# Patient Record
Sex: FEMALE | Race: ASIAN | NOT HISPANIC OR LATINO | Employment: FULL TIME | URBAN - METROPOLITAN AREA
[De-identification: names, ages, dates, MRNs, and addresses within clinical notes are randomized per-mention and may not be internally consistent; named-entity substitution may affect disease eponyms.]

---

## 2017-05-04 ENCOUNTER — RADIANT APPOINTMENT (OUTPATIENT)
Dept: MAMMOGRAPHY | Facility: CLINIC | Age: 57
End: 2017-05-04
Payer: COMMERCIAL

## 2017-05-04 DIAGNOSIS — Z12.31 VISIT FOR SCREENING MAMMOGRAM: ICD-10-CM

## 2017-05-04 PROCEDURE — G0202 SCR MAMMO BI INCL CAD: HCPCS | Mod: TC

## 2017-08-11 ENCOUNTER — TELEPHONE (OUTPATIENT)
Dept: FAMILY MEDICINE | Facility: CLINIC | Age: 57
End: 2017-08-11

## 2017-08-11 DIAGNOSIS — R73.09 OTHER ABNORMAL GLUCOSE: Primary | ICD-10-CM

## 2017-08-11 DIAGNOSIS — E78.5 HYPERLIPIDEMIA WITH TARGET LDL LESS THAN 130: ICD-10-CM

## 2017-08-11 NOTE — TELEPHONE ENCOUNTER
Reason for call:  Patient reporting a symptom    Symptom or request: Patient calling and has been checking blood sugars. Blood sugars was 167 this morning before breakfast. It has been running in the 120s-150s. Patient does not have a diabetes diagnosis but has been checking her sugars when she checks her father's. Patient is wondering if she should have her A1C checked.    Duration (how long have symptoms been present): She has been checking since June and it has always been elevated.    Have you been treated for this before? No    Additional comments: Patient uses [facility]    Phone Number patient can be reached at:  Home number on file [phone] (home)    Best Time:  any    Can we leave a detailed message on this number:  YES    Call taken on 8/11/2017 at 10:04 AM by Valentina Cm

## 2017-08-11 NOTE — TELEPHONE ENCOUNTER
Patient returned call to RN Triage line, RN picked up and scheduled labs and physical with [facility].      Pippa Andersen RN  [facility]

## 2017-08-11 NOTE — TELEPHONE ENCOUNTER
Please see message below regarding elevated blood sugars.  Last physical was 8/2014, A1c has not been checked, last OV was 5/2016.    Would be due for a physical.      Routed to PCP.      Pippa Andersen RN  [facility]

## 2017-08-11 NOTE — TELEPHONE ENCOUNTER
"Offer to schedule fasting labs and then follow up visit with me (call it \"elevated sugar\" or if she wants, call it AFE w/PAP)  "

## 2017-08-11 NOTE — TELEPHONE ENCOUNTER
Called and left detailed message, relaying request for appt as below.  Left RN Triage line # to schedule.      Pippa Andersen RN  [facility]

## 2017-08-15 DIAGNOSIS — Z11.59 NEED FOR HEPATITIS C SCREENING TEST: ICD-10-CM

## 2017-08-15 DIAGNOSIS — R73.09 OTHER ABNORMAL GLUCOSE: ICD-10-CM

## 2017-08-15 DIAGNOSIS — J45.20 MILD INTERMITTENT ASTHMA WITHOUT COMPLICATION: ICD-10-CM

## 2017-08-15 DIAGNOSIS — E78.5 HYPERLIPIDEMIA WITH TARGET LDL LESS THAN 130: ICD-10-CM

## 2017-08-15 LAB
ANION GAP SERPL CALCULATED.3IONS-SCNC: 8 MMOL/L (ref 3–14)
BUN SERPL-MCNC: 12 MG/DL (ref 7–30)
CALCIUM SERPL-MCNC: 9.2 MG/DL (ref 8.5–10.1)
CHLORIDE SERPL-SCNC: 104 MMOL/L (ref 94–109)
CHOLEST SERPL-MCNC: 268 MG/DL
CO2 SERPL-SCNC: 26 MMOL/L (ref 20–32)
CREAT SERPL-MCNC: 0.8 MG/DL (ref 0.52–1.04)
CREAT UR-MCNC: 231 MG/DL
GFR SERPL CREATININE-BSD FRML MDRD: 74 ML/MIN/1.7M2
GLUCOSE SERPL-MCNC: 135 MG/DL (ref 70–99)
HBA1C MFR BLD: 6.5 % (ref 4.3–6)
HCV AB SERPL QL IA: NONREACTIVE
HDLC SERPL-MCNC: 40 MG/DL
LDLC SERPL CALC-MCNC: 189 MG/DL
MICROALBUMIN UR-MCNC: 12 MG/L
MICROALBUMIN/CREAT UR: 5.06 MG/G CR (ref 0–25)
NONHDLC SERPL-MCNC: 228 MG/DL
POTASSIUM SERPL-SCNC: 3.9 MMOL/L (ref 3.4–5.3)
SODIUM SERPL-SCNC: 138 MMOL/L (ref 133–144)
TRIGL SERPL-MCNC: 193 MG/DL
TSH SERPL DL<=0.005 MIU/L-ACNC: 1.34 MU/L (ref 0.4–4)

## 2017-08-15 PROCEDURE — 80048 BASIC METABOLIC PNL TOTAL CA: CPT | Performed by: INTERNAL MEDICINE

## 2017-08-15 PROCEDURE — 86803 HEPATITIS C AB TEST: CPT | Performed by: INTERNAL MEDICINE

## 2017-08-15 PROCEDURE — 82043 UR ALBUMIN QUANTITATIVE: CPT | Performed by: INTERNAL MEDICINE

## 2017-08-15 PROCEDURE — 36415 COLL VENOUS BLD VENIPUNCTURE: CPT | Performed by: INTERNAL MEDICINE

## 2017-08-15 PROCEDURE — 83036 HEMOGLOBIN GLYCOSYLATED A1C: CPT | Performed by: INTERNAL MEDICINE

## 2017-08-15 PROCEDURE — 84443 ASSAY THYROID STIM HORMONE: CPT | Performed by: INTERNAL MEDICINE

## 2017-08-15 PROCEDURE — 80061 LIPID PANEL: CPT | Performed by: INTERNAL MEDICINE

## 2017-08-15 NOTE — LETTER
[facility]  [address] [place]  [address]  [phone]        August 17, 2017    Oxana Malachi  [address]  [facility] [address]        Dear Karyn,    Here are your labs as we discussed today. The glucose (blood sugar) is a little elevated. The HgbA1C is right at the borderline level for a diagnosis of diabetes.  So, I think you have mild, early diabetes.  Weight loss and diet will help this a lot.  You will see the diabetic educators for this.     The cholesterol medication (atorvastatin) is very important in protecting the heart.  You also have very high cholesterol, so we have two reasons to have you take this.     I will see you in 3 months (November).  Get a fasting lab draw prior to the appointment.  I look forward to seeing you then.     Results for orders placed or performed in visit on 08/15/17   Basic metabolic panel   Result Value Ref Range    Sodium 138 133 - 144 mmol/L    Potassium 3.9 3.4 - 5.3 mmol/L    Chloride 104 94 - 109 mmol/L    Carbon Dioxide 26 20 - 32 mmol/L    Anion Gap 8 3 - 14 mmol/L    Glucose 135 (H) 70 - 99 mg/dL    Urea Nitrogen 12 7 - 30 mg/dL    Creatinine 0.80 0.52 - 1.04 mg/dL    GFR Estimate 74 >60 mL/min/1.7m2    GFR Estimate If Black 90 >60 mL/min/1.7m2    Calcium 9.2 8.5 - 10.1 mg/dL   Hemoglobin A1c   Result Value Ref Range    Hemoglobin A1C 6.5 (H) 4.3 - 6.0 %   Albumin Random Urine Quantitative   Result Value Ref Range    Creatinine Urine 231 mg/dL    Albumin Urine mg/L 12 mg/L    Albumin Urine mg/g Cr 5.06 0 - 25 mg/g Cr   TSH with free T4 reflex   Result Value Ref Range    TSH 1.34 0.40 - 4.00 mU/L   Lipid panel reflex to direct LDL   Result Value Ref Range    Cholesterol 268 (H) <200 mg/dL    Triglycerides 193 (H) <150 mg/dL    HDL Cholesterol 40 (L) >49 mg/dL    LDL Cholesterol Calculated 189 (H) <100 mg/dL    Non HDL Cholesterol 228 (H) <130 mg/dL   Hepatitis C antibody   Result Value Ref Range    Hepatitis C  Antibody Nonreactive NR^Nonreactive       If you have any questions please call the clinic at [phone].    Sincerely,    Talia CARR

## 2017-08-17 ENCOUNTER — OFFICE VISIT (OUTPATIENT)
Dept: FAMILY MEDICINE | Facility: CLINIC | Age: 57
End: 2017-08-17
Payer: COMMERCIAL

## 2017-08-17 VITALS
HEIGHT: 60 IN | SYSTOLIC BLOOD PRESSURE: 95 MMHG | HEART RATE: 61 BPM | WEIGHT: 139 LBS | OXYGEN SATURATION: 97 % | BODY MASS INDEX: 27.29 KG/M2 | TEMPERATURE: 97.7 F | DIASTOLIC BLOOD PRESSURE: 67 MMHG

## 2017-08-17 DIAGNOSIS — J45.20 MILD INTERMITTENT ASTHMA WITHOUT COMPLICATION: ICD-10-CM

## 2017-08-17 DIAGNOSIS — E11.9 TYPE 2 DIABETES MELLITUS WITHOUT COMPLICATION, WITHOUT LONG-TERM CURRENT USE OF INSULIN (H): ICD-10-CM

## 2017-08-17 DIAGNOSIS — E78.5 HYPERLIPIDEMIA LDL GOAL <100: ICD-10-CM

## 2017-08-17 DIAGNOSIS — Z23 NEED FOR PNEUMOCOCCAL VACCINATION: ICD-10-CM

## 2017-08-17 DIAGNOSIS — Z00.00 ROUTINE GENERAL MEDICAL EXAMINATION AT A HEALTH CARE FACILITY: Primary | ICD-10-CM

## 2017-08-17 DIAGNOSIS — Z12.4 SCREENING FOR MALIGNANT NEOPLASM OF CERVIX: ICD-10-CM

## 2017-08-17 PROCEDURE — 90732 PPSV23 VACC 2 YRS+ SUBQ/IM: CPT | Performed by: INTERNAL MEDICINE

## 2017-08-17 PROCEDURE — 99396 PREV VISIT EST AGE 40-64: CPT | Mod: 25 | Performed by: INTERNAL MEDICINE

## 2017-08-17 PROCEDURE — 90471 IMMUNIZATION ADMIN: CPT | Performed by: INTERNAL MEDICINE

## 2017-08-17 PROCEDURE — G0476 HPV COMBO ASSAY CA SCREEN: HCPCS | Performed by: INTERNAL MEDICINE

## 2017-08-17 PROCEDURE — G0145 SCR C/V CYTO,THINLAYER,RESCR: HCPCS | Performed by: INTERNAL MEDICINE

## 2017-08-17 PROCEDURE — 99213 OFFICE O/P EST LOW 20 MIN: CPT | Mod: 25 | Performed by: INTERNAL MEDICINE

## 2017-08-17 RX ORDER — LANCETS
EACH MISCELLANEOUS
Qty: 100 EACH | Refills: 3 | Status: SHIPPED | OUTPATIENT
Start: 2017-08-17 | End: 2018-05-25

## 2017-08-17 RX ORDER — GLUCOSAMINE HCL/CHONDROITIN SU 500-400 MG
CAPSULE ORAL
Qty: 100 EACH | Refills: 3 | Status: SHIPPED | OUTPATIENT
Start: 2017-08-17 | End: 2018-05-25

## 2017-08-17 RX ORDER — ATORVASTATIN CALCIUM 10 MG/1
10 TABLET, FILM COATED ORAL DAILY
Qty: 90 TABLET | Refills: 3 | Status: SHIPPED | OUTPATIENT
Start: 2017-08-17 | End: 2017-11-30 | Stop reason: DRUGHIGH

## 2017-08-17 RX ORDER — GLUCOSAMINE HCL/CHONDROITIN SU 500-400 MG
CAPSULE ORAL
Qty: 100 EACH | Refills: 3 | Status: SHIPPED | OUTPATIENT
Start: 2017-08-17 | End: 2017-08-17

## 2017-08-17 RX ORDER — LANCETS
EACH MISCELLANEOUS
Qty: 100 EACH | Refills: 3 | Status: SHIPPED | OUTPATIENT
Start: 2017-08-17 | End: 2017-08-17

## 2017-08-17 RX ORDER — ATORVASTATIN CALCIUM 20 MG/1
20 TABLET, FILM COATED ORAL DAILY
Qty: 90 TABLET | Refills: 3 | Status: SHIPPED | OUTPATIENT
Start: 2017-08-17 | End: 2018-09-17

## 2017-08-17 ASSESSMENT — PAIN SCALES - GENERAL: PAINLEVEL: NO PAIN (0)

## 2017-08-17 NOTE — MR AVS SNAPSHOT
After Visit Summary   8/17/2017    Karyn Ly    MRN: [id number]           Patient Information     Date Of Birth          1960        Visit Information        Provider Department      8/17/2017 7:20 AM Talia Soto MD [facility]        Today's Diagnoses     Routine general medical examination at a health care facility    -  1    Type 2 diabetes mellitus without complication, without long-term current use of insulin (H)        Mild intermittent asthma without complication        Hyperlipidemia LDL goal <100        Screening for malignant neoplasm of cervix        Need for pneumococcal vaccination          Care Instructions    Start atorvastatin 20 mg daily    Diabetic education will call you -- get diabetes supplies prior to appointment and bring with you    Recheck fasting labs in 3 months, follow up visit a few days later.               Follow-ups after your visit        Additional Services     DIABETES EDUCATOR REFERRAL       DIABETES SELF MANAGEMENT TRAINING (DSMT)      Your provider has referred you to Diabetes Education: FMG: Diabetes Education - All [facility] [phone]   [email or web address]    Type of training and number of hours: New Diagnosis: Initial group DSMT - 10 hours.      Medicare covers: 10 hours of initial DSMT in 12 month period from the time of first visit, plus 2 hours of follow-up DSMT annually, and additional hours as requested for insulin training.    Diabetes Type: Type 2 - Diet Control             Diabetes Co-Morbidities: none               A1C Goal:  <7.0       A1C is: Lab Results       Component                Value               Date                       A1C                      6.5                 08/15/2017              If an urgent visit is needed or A1C is above 12, Care Team to call the Diabetes Education Team at [phone] or send an In Basket message to the  Diabetes Education Pool (P DIAB ED-PATIENT CARE).    Diabetes Education Topics: Comprehensive Knowledge Assessment and Instruction    Special Educational Needs Requiring Individual DSMT: None       MEDICAL NUTRITION THERAPY (MNT) for Diabetes    Medical Nutrition Therapy with a Registered Dietitian can be provided in coordination with Diabetes Self-Management Training to assist in achieving optimal diabetes management.    MNT Type and Hours: New diagnosis: Initial MNT - 3 hours                       Medicare will cover: 3 hours initial MNT in 12 month period after first visit, plus 2 hours of follow-up MNT annually    Please be aware that coverage of these services is subject to the terms and limitations of your health insurance plan.  Call member services at your health plan to determine Diabetes Self-Management Training benefits and ask which blood glucose monitor brands are covered by your plan.      Please bring the following with you to your appointment:    (1)  List of current medications   (2)  List of Blood Glucose Monitor brands that are covered by your insurance plan  (3)  Blood Glucose Monitor and log book  (4)   Food records for the 3 days prior to your visit    The Certified Diabetes Educator may make diabetes medication adjustments per the CDE Protocol and Collaborative Practice Agreement.                  Follow-up notes from your care team     Return in about 3 months (around 11/17/2017) for diabetes follow up.      Your next 10 appointments already scheduled     Apr 19, 2018  1:30 PM CDT   [facility] [place] Lou MD   [facility]    [address]  [place] [address]   [phone]              Future tests that were ordered for you today     Open Future Orders        Priority Expected Expires Ordered    Hemoglobin A1c Routine  8/17/2018 8/17/2017    Lipid panel reflex to direct LDL Routine  8/16/2018 8/17/2017    AST Routine  8/17/2018  "2017    ALT Routine  2018    CK total Routine  2018            Who to contact     If you have questions or need follow up information about today's clinic visit or your schedule please contact [facility] directly at [phone].  Normal or non-critical lab and imaging results will be communicated to you by MyChart, letter or phone within 4 business days after the clinic has received the results. If you do not hear from us within 7 days, please contact the clinic through MyChart or phone. If you have a critical or abnormal lab result, we will notify you by phone as soon as possible.  Submit refill requests through [employer] or call your pharmacy and they will forward the refill request to us. Please allow 3 business days for your refill to be completed.          Additional Information About Your Visit        MyChart Information     [facility] lets you send messages to your doctor, view your test results, renew your prescriptions, schedule appointments and more. To sign up, go to [email or web address] . Click on \"Log in\" on the left side of the screen, which will take you to the Welcome page. Then click on \"Sign up Now\" on the right side of the page.     You will be asked to enter the access code listed below, as well as some personal information. Please follow the directions to create your username and password.     Your access code is: [email or web address]  Expires: 11/15/2017  6:39 AM     Your access code will  in 90 days. If you need help or a new code, please call your [facility] or [phone].        Care EveryWhere ID     This is your Care EveryWhere ID. This could be used by other organizations to access your [facility] medical records  [id number]        Your Vitals Were     Pulse Temperature Height Pulse Oximetry BMI (Body Mass Index)       61 97.7  F (36.5  C) (Oral) 4' 11.5\" (1.511 m) 97% 27.6 kg/m2        Blood Pressure from Last 3 Encounters: "   08/17/17 95/67   05/03/16 106/70   04/21/15 110/77    Weight from Last 3 Encounters:   08/17/17 139 lb (63 kg)   05/03/16 143 lb (64.9 kg)   04/21/15 132 lb (59.9 kg)              We Performed the Following     ADMIN 1st VACCINE     Asthma Action Plan (AAP)     DIABETES EDUCATOR REFERRAL     HPV High Risk Types DNA Cervical     Pap imaged thin layer screen with HPV - recommended age 30 - 65 years (select HPV order below)     PNEUMOCOCCAL VACCINE,ADULT,SQ OR IM          Today's Medication Changes          These changes are accurate as of: 8/17/17  7:59 AM.  If you have any questions, ask your nurse or doctor.               Start taking these medicines.        Dose/Directions    ACE/ARB NOT PRESCRIBED (INTENTIONAL)   Used for:  Type 2 diabetes mellitus without complication, without long-term current use of insulin (H)   Started by:  Talia Soto MD        Please choose reason not prescribed, below   Refills:  0       alcohol swab prep pads   Used for:  Type 2 diabetes mellitus without complication, without long-term current use of insulin (H)   Started by:  Talia Soto MD        Use to swab area of injection/santosh as directed.   Quantity:  100 each   Refills:  3       * atorvastatin 10 MG tablet   Commonly known as:  LIPITOR   Used for:  Hyperlipidemia LDL goal <100   Started by:  Talia Soto MD        Dose:  10 mg   Take 1 tablet (10 mg) by mouth daily   Quantity:  90 tablet   Refills:  3       * atorvastatin 20 MG tablet   Commonly known as:  LIPITOR   Used for:  Hyperlipidemia LDL goal <100, Type 2 diabetes mellitus without complication, without long-term current use of insulin (H)   Started by:  Talia Soto MD        Dose:  20 mg   Take 1 tablet (20 mg) by mouth daily   Quantity:  90 tablet   Refills:  3       blood glucose calibration solution   Commonly known as:  NO BRAND SPECIFIED   Used for:  Type 2 diabetes mellitus without complication, without long-term current use of  insulin (H)   Started by:  Talia Soto MD        To accompany: Blood Glucose Monitor Brands: per insurance.   Quantity:  1 Bottle   Refills:  3       blood glucose monitoring meter device kit   Commonly known as:  no brand specified   Used for:  Type 2 diabetes mellitus without complication, without long-term current use of insulin (H)   Started by:  Talia Soto MD        Use to test blood sugar 1 times daily or as directed. Preferred blood glucose meter OR supplies to accompany: Blood Glucose Monitor Brands: per insurance.   Quantity:  1 kit   Refills:  0       blood glucose monitoring test strip   Commonly known as:  no brand specified   Used for:  Type 2 diabetes mellitus without complication, without long-term current use of insulin (H)   Started by:  Talia Soto MD        Use to test blood sugar 1 times daily or as directed. To accompany: Blood Glucose Monitor Brands: per insurance.   Quantity:  100 strip   Refills:  6       thin lancets   Commonly known as:  NO BRAND SPECIFIED   Used for:  Type 2 diabetes mellitus without complication, without long-term current use of insulin (H)   Started by:  Talia Soto MD        Use with lanceting device. To accompany: Blood Glucose Monitor Brands: per insurance.   Quantity:  100 each   Refills:  3       * Notice:  This list has 2 medication(s) that are the same as other medications prescribed for you. Read the directions carefully, and ask your doctor or other care provider to review them with you.         Where to get your medicines      These medications were sent to [facility]/pharmacy #[id number] - [address] AT CORNER OF [address] [place] [id number]     Phone:  [phone]     alcohol swab prep pads    atorvastatin 10 MG tablet    atorvastatin 20 MG tablet    blood glucose calibration solution    blood glucose monitoring test strip    thin lancets         Some of these will need a paper prescription and  others can be bought over the counter.  Ask your nurse if you have questions.     Bring a paper prescription for each of these medications     blood glucose monitoring meter device kit       You don't need a prescription for these medications     ACE/ARB NOT PRESCRIBED (INTENTIONAL)                Primary Care Provider Office Phone # Fax #    Talia Soto [phone]       [address] [facility] [address]        Equal Access to Services     LAVELL CROWELL : Hadii aad ku hadasho Soomaali, waaxda luqadaha, qaybta kaalmada adeegyada, waxay idiin hayaan adeeg khjuan carlossh laenio . So [facility] [phone].    ATENCIÓN: Si habla yoav, tiene a tony disposición servicios gratuitos de asistencia lingüística. Llame al [phone].    We comply with applicable federal civil rights laws and [place] laws. We do not discriminate on the basis of race, color, national origin, age, disability sex, sexual orientation or gender identity.            Thank you!     Thank you for choosing [facility]  for your care. Our goal is always to provide you with excellent care. Hearing back from our patients is one way we can continue to improve our services. Please take a few minutes to complete the written survey that you may receive in the mail after your visit with us. Thank you!             Your Updated Medication List - Protect others around you: Learn how to safely use, store and throw away your medicines at www.disposemymeds.org.          This list is accurate as of: 8/17/17  7:59 AM.  Always use your most recent med list.                   Brand Name Dispense Instructions for use Diagnosis    ACE/ARB NOT PRESCRIBED (INTENTIONAL)      Please choose reason not prescribed, below    Type 2 diabetes mellitus without complication, without long-term current use of insulin (H)       albuterol 108 (90 BASE) MCG/ACT Inhaler    PROAIR HFA/PROVENTIL HFA/VENTOLIN HFA    3 Inhaler    Inhale 2  puffs into the lungs every 6 hours as needed for shortness of breath / dyspnea or wheezing    URI (upper respiratory infection), Mild intermittent asthma, Fever       alcohol swab prep pads     100 each    Use to swab area of injection/santosh as directed.    Type 2 diabetes mellitus without complication, without long-term current use of insulin (H)       * atorvastatin 10 MG tablet    LIPITOR    90 tablet    Take 1 tablet (10 mg) by mouth daily    Hyperlipidemia LDL goal <100       * atorvastatin 20 MG tablet    LIPITOR    90 tablet    Take 1 tablet (20 mg) by mouth daily    Hyperlipidemia LDL goal <100, Type 2 diabetes mellitus without complication, without long-term current use of insulin (H)       blood glucose calibration solution    NO BRAND SPECIFIED    1 Bottle    To accompany: Blood Glucose Monitor Brands: per insurance.    Type 2 diabetes mellitus without complication, without long-term current use of insulin (H)       blood glucose monitoring meter device kit    no brand specified    1 kit    Use to test blood sugar 1 times daily or as directed. Preferred blood glucose meter OR supplies to accompany: Blood Glucose Monitor Brands: per insurance.    Type 2 diabetes mellitus without complication, without long-term current use of insulin (H)       blood glucose monitoring test strip    no brand specified    100 strip    Use to test blood sugar 1 times daily or as directed. To accompany: Blood Glucose Monitor Brands: per insurance.    Type 2 diabetes mellitus without complication, without long-term current use of insulin (H)       fluticasone 50 MCG/ACT spray    FLONASE    16 g    Spray 1-2 sprays into both nostrils daily    Post-nasal drip       thin lancets    NO BRAND SPECIFIED    100 each    Use with lanceting device. To accompany: Blood Glucose Monitor Brands: per insurance.    Type 2 diabetes mellitus without complication, without long-term current use of insulin (H)       triamcinolone 0.1 % cream     KENALOG    15 g    Apply sparingly to affected area three times daily for 14 days.    Eczema, unspecified eczema       * Notice:  This list has 2 medication(s) that are the same as other medications prescribed for you. Read the directions carefully, and ask your doctor or other care provider to review them with you.

## 2017-08-17 NOTE — PROGRESS NOTES
Karyn Crespono    Here are your labs as we discussed today. The glucose (blood sugar) is a little elevated. The HgbA1C is right at the borderline level for a diagnosis of diabetes.  So, I think you have mild, early diabetes.  Weight loss and diet will help this a lot.  You will see the diabetic educators for this.     The cholesterol medication (atorvastatin) is very important in protecting the heart.  You also have very high cholesterol, so we have two reasons to have you take this.     I will see you in 3 months (November).  Get a fasting lab draw prior to the appointment.  I look forward to seeing you then.       Sincerely,       RUFINO SMITH M.D.

## 2017-08-17 NOTE — PATIENT INSTRUCTIONS
Start atorvastatin 20 mg daily    Diabetic education will call you -- get diabetes supplies prior to appointment and bring with you    Recheck fasting labs in 3 months, follow up visit a few days later.

## 2017-08-17 NOTE — LETTER
August 23, 2017    Karyn Ly  [address] [facility] [address]    Dear Karyn,  We are happy to inform you that your PAP smear result from 8/17/17 is normal.  We are now able to do a follow up test on PAP smears. The DNA test is for HPV (Human Papilloma Virus). Cervical cancer is closely linked with certain types of HPV. Your result showed no evidence of high risk HPV.  Therefore we recommend you return in 3 years for your next pap smear and HPV test.  You will still need to return to the clinic every year for an annual exam and other preventive tests.  Please contact the clinic at [phone] with any questions.  Sincerely,    Talia Soto MD/paddy

## 2017-08-17 NOTE — NURSING NOTE
"Chief Complaint   Patient presents with     Physical     Health Maintenance     pap,AAP,ACT,eye,Hep C     *_* Health Care Directive *_*       Initial BP 95/67 (BP Location: Right arm, Patient Position: Chair, Cuff Size: Adult Regular)  Pulse 61  Temp 97.7  F (36.5  C) (Oral)  Ht 4' 11.5\" (1.511 m)  Wt 139 lb (63 kg)  SpO2 97%  BMI 27.6 kg/m2 Estimated body mass index is 27.6 kg/(m^2) as calculated from the following:    Height as of this encounter: 4' 11.5\" (1.511 m).    Weight as of this encounter: 139 lb (63 kg).  Medication Reconciliation: complete.  VIVIANE Mclaughlin MA      "

## 2017-08-17 NOTE — PROGRESS NOTES
SUBJECTIVE:   CC: Karyn Ly is an 56 year old woman who presents for preventive health visit.     Physical   Annual:     Getting at least 3 servings of Calcium per day::  Yes    Bi-annual eye exam::  Yes    Dental care twice a year::  NO    Sleep apnea or symptoms of sleep apnea::  Excessive snoring    Diet::  Regular (no restrictions)    Frequency of exercise::  2-3 days/week    Duration of exercise::  15-30 minutes    Taking medications regularly::  Not Applicable    Additional concerns today::  YES               Today's PHQ-2 Score: PHQ-2 ( 1999 [employer]) 8/17/2017   Q1: Little interest or pleasure in doing things 0   Q2: Feeling down, depressed or hopeless 0   PHQ-2 Score 0   Q1: Little interest or pleasure in doing things Not at all   Q2: Feeling down, depressed or hopeless Not at all   PHQ-2 Score 0       Abuse: Current or Past(Physical, Sexual or Emotional)- No  Do you feel safe in your environment - Yes    Social History   Substance Use Topics     Smoking status: Never Smoker     Smokeless tobacco: Never Used     Alcohol use 0.0 oz/week     0 Standard drinks or equivalent per week      Comment: occasional      The patient does not drink >3 drinks per day nor >7 drinks per week.    Reviewed orders with patient.  Reviewed health maintenance and updated orders accordingly - Yes  Labs reviewed in EPIC  BP Readings from Last 3 Encounters:   08/17/17 95/67   05/03/16 106/70   04/21/15 110/77    Wt Readings from Last 3 Encounters:   08/17/17 139 lb (63 kg)   05/03/16 143 lb (64.9 kg)   04/21/15 132 lb (59.9 kg)                  Patient Active Problem List   Diagnosis     Hyperlipidemia LDL goal <100     Mild intermittent asthma     Past Surgical History:   Procedure Laterality Date     COLONOSCOPY  2/17/2014    Procedure: COLONOSCOPY;  colon-screening / vandana;  Surgeon: Donovan Dinero MD;  Location:  OR       Social History   Substance Use Topics     Smoking status: Never Smoker      Smokeless tobacco: Never Used     Alcohol use 0.0 oz/week     0 Standard drinks or equivalent per week      Comment: occasional      Family History   Problem Relation Age of Onset     Hypertension Mother      DIABETES Father      Glaucoma Father      Lipids Brother      Macular Degeneration No family hx of          Current Outpatient Prescriptions   Medication Sig Dispense Refill     atorvastatin (LIPITOR) 10 MG tablet Take 1 tablet (10 mg) by mouth daily 90 tablet 3     ACE/ARB NOT PRESCRIBED, INTENTIONAL, Please choose reason not prescribed, below       atorvastatin (LIPITOR) 20 MG tablet Take 1 tablet (20 mg) by mouth daily 90 tablet 3     fluticasone (FLONASE) 50 MCG/ACT nasal spray Spray 1-2 sprays into both nostrils daily 16 g 6     albuterol (PROAIR HFA, PROVENTIL HFA, VENTOLIN HFA) 108 (90 BASE) MCG/ACT inhaler Inhale 2 puffs into the lungs every 6 hours as needed for shortness of breath / dyspnea or wheezing 3 Inhaler 1     triamcinolone (KENALOG) 0.1 % cream Apply sparingly to affected area three times daily for 14 days. (Patient not taking: Reported on 8/17/2017) 15 g 0     No Known Allergies  Recent Labs   Lab Test  08/15/17   0654  08/12/14   0737  02/06/14   0842   A1C  6.5*   --    --    LDL  189*   --   177*   HDL  40*   --   42*   TRIG  193*   --   135   CR  0.80   --   0.73   GFRESTIMATED  74   --   84   GFRESTBLACK  90   --   >90   POTASSIUM  3.9   --   4.1   TSH  1.34  1.21   --               Patient over age 50, mutual decision to screen reflected in health maintenance.      Pertinent mammograms are reviewed under the imaging tab.  History of abnormal Pap smear:   Last 3 Pap Results:   PAP (no units)   Date Value   02/06/2014 NIL       Reviewed and updated as needed this visit by clinical staffTobacco  Allergies  Meds  Med Hx  Surg Hx  Fam Hx  Soc Hx        Reviewed and updated as needed this visit by Provider          Past Medical History:   Diagnosis Date     Asthma       Past Surgical  "History:   Procedure Laterality Date     COLONOSCOPY  2/17/2014    Procedure: COLONOSCOPY;  colon-screening / vandana;  Surgeon: Donovan Dinero MD;  Location: [facility] OR         ROS:  C: NEGATIVE for fever, chills, change in weight  I: NEGATIVE for worrisome rashes, moles or lesions  E: NEGATIVE for vision changes or irritation  ENT: NEGATIVE for ear, mouth and throat problems  R: NEGATIVE for significant cough or SOB  B: NEGATIVE for masses, tenderness or discharge  CV: NEGATIVE for chest pain, palpitations or peripheral edema  GI: NEGATIVE for nausea, abdominal pain, heartburn, or change in bowel habits  : NEGATIVE for unusual urinary or vaginal symptoms. No vaginal bleeding.  M: NEGATIVE for significant arthralgias or myalgia  N: NEGATIVE for weakness, dizziness or paresthesias  E: NEGATIVE for temperature intolerance, skin/hair changes  P: NEGATIVE for changes in mood or affect      OBJECTIVE:   BP 95/67 (BP Location: Right arm, Patient Position: Chair, Cuff Size: Adult Regular)  Pulse 61  Temp 97.7  F (36.5  C) (Oral)  Ht 4' 11.5\" (1.511 m)  Wt 139 lb (63 kg)  SpO2 97%  BMI 27.6 kg/m2  EXAM:  GENERAL APPEARANCE: healthy, alert and no distress  EYES: Eyes grossly normal to inspection, PERRL and conjunctivae and sclerae normal  HENT: ear canals and TM's normal, nose and mouth without ulcers or lesions, oropharynx clear and oral mucous membranes moist  NECK: no adenopathy, no asymmetry, masses, or scars and thyroid normal to palpation  RESP: lungs clear to auscultation - no rales, rhonchi or wheezes  BREAST: normal without masses, tenderness or nipple discharge and no palpable axillary masses or adenopathy  CV: regular rate and rhythm, normal S1 S2, no S3 or S4, no murmur, click or rub, no peripheral edema and peripheral pulses strong  ABDOMEN: soft, nontender, no hepatosplenomegaly, no masses and bowel sounds normal   (female): normal female external genitalia, normal urethral meatus, vaginal " mucosal atrophy noted, normal cervix, adnexae, and uterus without masses or abnormal discharge   (female): pap done with good view cervix  MS: no musculoskeletal defects are noted and gait is age appropriate without ataxia  SKIN: no suspicious lesions or rashes  NEURO: Normal strength and tone, sensory exam grossly normal, mentation intact and speech normal  PSYCH: mentation appears normal and affect normal/bright    ASSESSMENT/PLAN:       ICD-10-CM    1. Routine general medical examination at a health care facility Z00.00    2. Type 2 diabetes mellitus without complication, without long-term current use of insulin (H) E11.9 ACE/ARB NOT PRESCRIBED, INTENTIONAL,     Hemoglobin A1c     DIABETES EDUCATOR REFERRAL     Lipid panel reflex to direct LDL     atorvastatin (LIPITOR) 20 MG tablet     blood glucose monitoring (NO BRAND SPECIFIED) meter device kit     blood glucose monitoring (NO BRAND SPECIFIED) test strip     blood glucose calibration (NO BRAND SPECIFIED) solution     thin (NO BRAND SPECIFIED) lancets     alcohol swab prep pads     OFFICE/OUTPT VISIT,EST,LEVL III     DISCONTINUED: blood glucose monitoring (NO BRAND SPECIFIED) meter device kit     DISCONTINUED: blood glucose monitoring (NO BRAND SPECIFIED) test strip     DISCONTINUED: blood glucose calibration (NO BRAND SPECIFIED) solution     DISCONTINUED: thin (NO BRAND SPECIFIED) lancets     DISCONTINUED: alcohol swab prep pads   3. Mild intermittent asthma without complication J45.20 OFFICE/OUTPT VISIT,EST,LEVL III   4. Hyperlipidemia LDL goal <100 E78.5 atorvastatin (LIPITOR) 10 MG tablet     Lipid panel reflex to direct LDL     AST     ALT     CK total     atorvastatin (LIPITOR) 20 MG tablet   5. Screening for malignant neoplasm of cervix Z12.4 Pap imaged thin layer screen with HPV - recommended age 30 - 65 years (select HPV order below)     HPV High Risk Types DNA Cervical   6. Need for pneumococcal vaccination Z23 ADMIN 1st VACCINE     PNEUMOCOCCAL  "VACCINE,ADULT,SQ OR IM     DISCONTINUED: pneumococcal vaccine (PNEUMOVAX 23-SEVEN) 25 MCG/0.5ML injection         Patient with new borderline DM.  Will start cholesterol Rx and DM education.   Side effects discussed and questions answered.     Return to clinic 3 months with FLP and A1C    ACE not started.  BP is low at baseline.     COUNSELING:  Reviewed preventive health counseling, as reflected in patient instructions       Regular exercise         reports that she has never smoked. She has never used smokeless tobacco.    Estimated body mass index is 27.6 kg/(m^2) as calculated from the following:    Height as of this encounter: 4' 11.5\" (1.511 m).    Weight as of this encounter: 139 lb (63 kg).   Weight management plan: DM education    Counseling Resources:  ATP IV Guidelines  Pooled Cohorts Equation Calculator  Breast Cancer Risk Calculator  FRAX Risk Assessment  ICSI Preventive Guidelines  Dietary Guidelines for Americans, 2010  USDA's MyPlate  ASA Prophylaxis  Lung CA Screening    Talia Soto MD  [facility]  Answers for HPI/ROS submitted by the patient on 8/17/2017   PHQ-2 Score: 0    "

## 2017-08-17 NOTE — NURSING NOTE
Screening Questionnaire for Adult Immunization    Are you sick today?   No   Do you have allergies to medications, food, a vaccine component or latex?   No   Have you ever had a serious reaction after receiving a vaccination?   No   Do you have a long-term health problem with heart disease, lung disease, asthma, kidney disease, metabolic disease (e.g. diabetes), anemia, or other blood disorder?   Yes   Do you have cancer, leukemia, HIV/AIDS, or any other immune system problem?   No   In the past 3 months, have you taken medications that affect  your immune system, such as prednisone, other steroids, or anticancer drugs; drugs for the treatment of rheumatoid arthritis, Crohn s disease, or psoriasis; or have you had radiation treatments?   No   Have you had a seizure, or a brain or other nervous system problem?   No   During the past year, have you received a transfusion of blood or blood     products, or been given immune (gamma) globulin or antiviral drug?   No   For women: Are you pregnant or is there a chance you could become        pregnant during the next month?   No   Have you received any vaccinations in the past 4 weeks?   No     Immunization questionnaire was positive for at least one answer.  Notified Talia Stoo.        Per orders of Dr. Soto, injection of Pneumococcal 23 was given. Patient instructed to remain in clinic for 15 minutes afterwards, and to report any adverse reaction to me immediately.    Prior to injection verified patient identity using patient's name and date of birth.  Vaccine information supplied.     Screening performed by Ngoc Godfrey on 8/17/2017 at 8:13 AM.

## 2017-08-18 LAB
COPATH REPORT: NORMAL
PAP: NORMAL

## 2017-08-18 ASSESSMENT — ASTHMA QUESTIONNAIRES: ACT_TOTALSCORE: 25

## 2017-08-21 LAB
FINAL DIAGNOSIS: NORMAL
HPV HR 12 DNA CVX QL NAA+PROBE: NEGATIVE
HPV16 DNA SPEC QL NAA+PROBE: NEGATIVE
HPV18 DNA SPEC QL NAA+PROBE: NEGATIVE
SPECIMEN DESCRIPTION: NORMAL

## 2017-09-21 ENCOUNTER — OFFICE VISIT (OUTPATIENT)
Dept: NUTRITION | Facility: CLINIC | Age: 57
End: 2017-09-21
Payer: COMMERCIAL

## 2017-09-21 VITALS — BODY MASS INDEX: 27.31 KG/M2 | WEIGHT: 137.5 LBS

## 2017-09-21 DIAGNOSIS — E11.9 TYPE 2 DIABETES MELLITUS WITHOUT COMPLICATION, WITHOUT LONG-TERM CURRENT USE OF INSULIN (H): Primary | ICD-10-CM

## 2017-09-21 PROCEDURE — G0108 DIAB MANAGE TRN  PER INDIV: HCPCS

## 2017-09-21 NOTE — PROGRESS NOTES
Diabetes Self Management Training: Initial Assessment Visit for Newly Diagnosed Patients (Complete AADE Goals Flowsheet)    Karyn Ly presents today for education and evaluation of glucose control related to Type 2 diabetes.    She is accompanied by self    Patient's diabetes management related comments/concerns: knows she needs to begin exercising more and eating less rice    Patient's emotional response to diabetes: expresses readiness to learn, concern for health and well-being and confidence diabetes can be controlled    Patient would like this visit to be focused around the following diabetes-related behaviors and goals: Assistance with making lifestyle changes and Healthy Eating    ASSESSMENT:  Patient Problem List and Family Medical History reviewed for relevant medical history, current medical status, and diabetes risk factors.    Current Diabetes Management per Patient:  Taking diabetes medications? no      Past Diabetes Education: Newly diagnosed    Patient glucose self monitoring as follows: 1-3 times daily.     BG results: fasting glucose- 100, 105, 118, 123, 138, 135, 166, 150, 123, 142, 157, 154, 141, 142, 123, 139, pre-lunch glucose- 71, 82, 75, 84, 80, 102, 94, 88, 83, 92 and post-supper glucose- 129, 108, 110, 124, 196, 87     BG values are: Not in goal fasting, mostly in goal during the remainder of the day  Patient's most recent   Lab Results   Component Value Date    A1C 6.5 08/15/2017    is meeting goal of <7.0    Nutrition:  Patient eats 3 meals per day    Wake up at 5:30 am - gets to mom's at 6:30 am - take stairs to their home and makes breakfast by 8:30 am    Breakfast - 8:30 am - cornflakes with strawberries + coffee OR slice of bread, egg, sausage, banana + coffee  Lunch - 1 pm - rice, meat + water  Dinner - 6 - 6:30 pm - salmon or fish, vegetable with dressing, has mostly stopped eating rice in the evening + water  Snacks - rarely in the afternoon - grapes    Beverages:  "Coffee with creamer once a day and Water with lemon throughout the day    Cultural/Mandaeism diet restrictions: No     Biggest Challenge to Healthy Eating: none    Physical Activity:    Type: walking + belly dancing + Latin dance DVD's  Duration: 15 minutes walking OR 15-30 minutes dancing  Frequency: 7 days/week, dancing once in awhile    Diabetes Risk Factors:  family history, age over 45 years, ethnicity and overweight/obesity    Diabetes Complications:  Not discussed today.    Vitals:  Wt 62.4 kg (137 lb 8 oz)  BMI 27.31 kg/m2  Estimated body mass index is 27.31 kg/(m^2) as calculated from the following:    Height as of 8/17/17: 1.511 m (4' 11.5\").    Weight as of this encounter: 62.4 kg (137 lb 8 oz).   Last 3 BP:   BP Readings from Last 3 Encounters:   08/17/17 95/67   05/03/16 106/70   04/21/15 110/77       History   Smoking Status     Never Smoker   Smokeless Tobacco     Never Used       Labs:  Lab Results   Component Value Date    A1C 6.5 08/15/2017     Lab Results   Component Value Date     08/15/2017     Lab Results   Component Value Date     08/15/2017     HDL Cholesterol   Date Value Ref Range Status   08/15/2017 40 (L) >49 mg/dL Final   ]  GFR Estimate   Date Value Ref Range Status   08/15/2017 74 >60 mL/min/1.7m2 Final     Comment:     Non  GFR Calc     GFR Estimate If Black   Date Value Ref Range Status   08/15/2017 90 >60 mL/min/1.7m2 Final     Comment:      GFR Calc     Lab Results   Component Value Date    CR 0.80 08/15/2017     No results found for: MICROALBUMIN    Socio/Economic Considerations:    Support system: family    Health Beliefs and Attitudes:   Patient Activation Measure Survey Score:  DEVAN Score (Last Two) 2/6/2014   DEVAN Raw Score 36   Activation Score 47.4   DEVAN Level 2       Stage of Change: ACTION (Actively working towards change)      Diabetes knowledge and skills assessment:     Patient is knowledgeable in diabetes management concepts " related to: Being Active and Monitoring    Patient needs further education on the following diabetes management concepts: Healthy Eating, Being Active, Monitoring, Problem Solving, Reducing Risks and Healthy Coping    Barriers to Learning Assessment: No Barriers identified    Based on learning assessment above, most appropriate setting for further diabetes education would be: Group class or Individual setting.    INTERVENTION:   Education provided today on:  AADE Self-Care Behaviors:  Healthy Eating: consistency in amount, composition, and timing of food intake, weight reduction, portion control and plate planning method  Being Active: relationship to blood glucose and describe appropriate activity program  Monitoring: log and interpret results, individual blood glucose targets, frequency of monitoring, use of glucose control solution and proper sharps disposal  Healthy Coping: recognize feelings about diagnosis, benefits of making appropriate lifestyle changes and utilize support systems    Opportunities for ongoing education and support in diabetes-self management were discussed.    Pt verbalized understanding of concepts discussed and recommendations provided today.       Education Materials Provided:  [place] Understanding Diabetes Booklet, Safe Disposal Options for Needles & Syringes, BG Log Sheet, My Plate Planner and [place] diabetes education group class information    PLAN:  See Patient Instructions for co-developed, patient-stated behavior change goals.  AVS printed and provided to patient today.    FOLLOW-UP:  Follow-up appointment scheduled on 10/26/17.  Schedule for group class education for new diagnosis.  Chart routed to referring provider.    Ongoing plan for education and support: Written resources (magazines, books, etc.), Follow-up visit with diabetes educator in 1 month, Diabetes Education group class(es) and Follow-up with primary care provider    Debra Capps, MPH, RD, LD, CDE   Time Spent: 60  minutes  Encounter Type: Individual

## 2017-09-21 NOTE — Clinical Note
Cj Melgar, I met with Karyn today for initial diabetes education. She's already making great lifestyle changes, reviewed diet, activity recommendations. We'll follow-up in 1 month and she will plan to attend diabetes education group classes at [facility].  Thanks! Debra

## 2017-09-21 NOTE — PATIENT INSTRUCTIONS
Continue to have balanced meals - Add rice back to dinner - okay to have 2/3 cup    Work on increasing activity/exercise, as you plan to    Group Diabetes Education Classes at [facility] - [address] [place] [address] [address], [place]    First Thursday of each month, from 4-6 pm  October 5 - Class 1  November 2 - Class 2  December 7 - Class 3    Check with insurance on coverage and call [phone] to schedule    Follow-up with Debra on Thursday, October 26 at 9:30 am at the [facility]    Call with any questions in the meantime - [phone]

## 2017-09-21 NOTE — MR AVS SNAPSHOT
After Visit Summary   9/21/2017    Karyn Ly    MRN: [id number]           Patient Information     Date Of Birth          1960        Visit Information        Provider Department      9/21/2017 9:30 AM CP NUTRITION RESOURCE [facility]        Care Instructions    Continue to have balanced meals - Add rice back to dinner - okay to have 2/3 cup    Work on increasing activity/exercise, as you plan to    Group Diabetes Education Classes at [facility] - [address] [place] [address] Melania DAVIS    First Thursday of each month, from 4-6 pm  October 5 - Class 1  November 2 - Class 2  December 7 - Class 3    Check with insurance on coverage and call [phone] to schedule    Follow-up with Debra on Thursday, October 26 at 9:30 am at the [facility]    Call with any questions in the meantime - [phone]              Follow-ups after your visit        Your next 10 appointments already scheduled     Oct 26, 2017  9:30 AM CDT   Diabetic Education with CP NUTRITION RESOURCE   [facility] ([facility])    [address] [facility] [id number] [phone]            Apr 19, 2018  1:30 PM CDT   New Visit with Montana Lou MD   [facility] ([facility]    [address] [place] [address] [place] [id number] [phone]              Who to contact     If you have questions or need follow up information about today's clinic visit or your schedule please contact [facility] directly at [phone].  Normal or non-critical lab and imaging results will be communicated to you by MyChart, letter or phone within 4 business days after the clinic has received the results. If you do not hear from us within 7 days, please contact the clinic through MyChart or phone. If you have a critical or abnormal lab result, we will notify you by phone as soon as  "possible.  Submit refill requests through [email or web address] or call your pharmacy and they will forward the refill request to us. Please allow 3 business days for your refill to be completed.          Additional Information About Your Visit        [email or web address] Information     [email or web address] lets you send messages to your doctor, view your test results, renew your prescriptions, schedule appointments and more. To sign up, go to [email or web address] . Click on \"Log in\" on the left side of the screen, which will take you to the Welcome page. Then click on \"Sign up Now\" on the right side of the page.     You will be asked to enter the access code listed below, as well as some personal information. Please follow the directions to create your username and password.     Your access code is: [email or web address]  Expires: 11/15/2017  6:39 AM     Your access code will  in 90 days. If you need help or a new code, please call your [facility] clinic or [phone].        Care EveryWhere ID     This is your Care EveryWhere ID. This could be used by other organizations to access your [facility] medical records  [id number]        Your Vitals Were     BMI (Body Mass Index)                   27.31 kg/m2            Blood Pressure from Last 3 Encounters:   17 95/67   16 106/70   04/21/15 110/77    Weight from Last 3 Encounters:   17 62.4 kg (137 lb 8 oz)   17 63 kg (139 lb)   16 64.9 kg (143 lb)              Today, you had the following     No orders found for display       Primary Care Provider Office Phone # Fax #    Talia Soto [phone]       [address] [facility] [address]        Equal Access to Services     ROBERTA CROWELL : Maxine Mcmanus, trevor batista, corey calvo. So [facility] [phone].    ATENCIÓN: Si habla español, tiene a tony disposición servicios gratuitos de asistencia lingüística. Llame al " [phone].    We comply with applicable federal civil rights laws and [place] laws. We do not discriminate on the basis of race, color, national origin, age, disability sex, sexual orientation or gender identity.            Thank you!     Thank you for choosing [facility]  for your care. Our goal is always to provide you with excellent care. Hearing back from our patients is one way we can continue to improve our services. Please take a few minutes to complete the written survey that you may receive in the mail after your visit with us. Thank you!             Your Updated Medication List - Protect others around you: Learn how to safely use, store and throw away your medicines at www.disposemymeds.org.          This list is accurate as of: 9/21/17 10:34 AM.  Always use your most recent med list.                   Brand Name Dispense Instructions for use Diagnosis    ACE/ARB NOT PRESCRIBED (INTENTIONAL)      Please choose reason not prescribed, below    Type 2 diabetes mellitus without complication, without long-term current use of insulin (H)       albuterol 108 (90 BASE) MCG/ACT Inhaler    PROAIR HFA/PROVENTIL HFA/VENTOLIN HFA    3 Inhaler    Inhale 2 puffs into the lungs every 6 hours as needed for shortness of breath / dyspnea or wheezing    URI (upper respiratory infection), Mild intermittent asthma, Fever       alcohol swab prep pads     100 each    Use to swab area of injection/santosh as directed.    Type 2 diabetes mellitus without complication, without long-term current use of insulin (H)       * atorvastatin 10 MG tablet    LIPITOR    90 tablet    Take 1 tablet (10 mg) by mouth daily    Hyperlipidemia LDL goal <100       * atorvastatin 20 MG tablet    LIPITOR    90 tablet    Take 1 tablet (20 mg) by mouth daily    Hyperlipidemia LDL goal <100, Type 2 diabetes mellitus without complication, without long-term current use of insulin (H)       blood glucose calibration solution    NO  BRAND SPECIFIED    1 Bottle    To accompany: Blood Glucose Monitor Brands: per insurance.    Type 2 diabetes mellitus without complication, without long-term current use of insulin (H)       blood glucose monitoring meter device kit    no brand specified    1 kit    Use to test blood sugar 1 times daily or as directed. Preferred blood glucose meter OR supplies to accompany: Blood Glucose Monitor Brands: per insurance.    Type 2 diabetes mellitus without complication, without long-term current use of insulin (H)       blood glucose monitoring test strip    no brand specified    100 strip    Use to test blood sugar 1 times daily or as directed. To accompany: Blood Glucose Monitor Brands: per insurance.    Type 2 diabetes mellitus without complication, without long-term current use of insulin (H)       fluticasone 50 MCG/ACT spray    FLONASE    16 g    Spray 1-2 sprays into both nostrils daily    Post-nasal drip       thin lancets    NO BRAND SPECIFIED    100 each    Use with lanceting device. To accompany: Blood Glucose Monitor Brands: per insurance.    Type 2 diabetes mellitus without complication, without long-term current use of insulin (H)       triamcinolone 0.1 % cream    KENALOG    15 g    Apply sparingly to affected area three times daily for 14 days.    Eczema, unspecified eczema       * Notice:  This list has 2 medication(s) that are the same as other medications prescribed for you. Read the directions carefully, and ask your doctor or other care provider to review them with you.

## 2017-10-03 ENCOUNTER — ALLIED HEALTH/NURSE VISIT (OUTPATIENT)
Dept: NURSING | Facility: CLINIC | Age: 57
End: 2017-10-03
Payer: COMMERCIAL

## 2017-10-03 DIAGNOSIS — Z23 NEED FOR PROPHYLACTIC VACCINATION AND INOCULATION AGAINST INFLUENZA: Primary | ICD-10-CM

## 2017-10-03 PROCEDURE — 90686 IIV4 VACC NO PRSV 0.5 ML IM: CPT

## 2017-10-03 PROCEDURE — 90471 IMMUNIZATION ADMIN: CPT

## 2017-10-03 PROCEDURE — 99207 ZZC NO CHARGE NURSE ONLY: CPT

## 2017-10-03 NOTE — PROGRESS NOTES
Injectable Influenza Immunization Documentation    1.  Is the person to be vaccinated sick today?   No    2. Does the person to be vaccinated have an allergy to a component   of the vaccine?   No    3. Has the person to be vaccinated ever had a serious reaction   to influenza vaccine in the past?   No    4. Has the person to be vaccinated ever had Guillain-Barré syndrome?   No    Form completed by Rima Staton MA  Patient informed to wait 20 min after shot

## 2017-10-03 NOTE — MR AVS SNAPSHOT
After Visit Summary   10/3/2017    Karyn Ly    MRN: [id number]           Patient Information     Date Of Birth          1960        Visit Information        Provider Department      10/3/2017 10:35 AM CP FLU CLINIC [facility]        Today's Diagnoses     Need for prophylactic vaccination and inoculation against influenza    -  1       Follow-ups after your visit        Your next 10 appointments already scheduled     Oct 26, 2017  9:30 AM CDT   Diabetic Education with CP NUTRITION RESOURCE   [facility] ([facility])    [address] [facility] [id number] [phone]            Apr 19, 2018  1:30 PM CDT   New Visit with Montana Lou MD   [facility] ([facility])    [address] [place] [id number] [phone]              Who to contact     If you have questions or need follow up information about today's clinic visit or your schedule please contact [facility] directly at [phone].  Normal or non-critical lab and imaging results will be communicated to you by Comun[employer]art, letter or phone within 4 business days after the clinic has received the results. If you do not hear from us within 7 days, please contact the clinic through Comun[employer]art or phone. If you have a critical or abnormal lab result, we will notify you by phone as soon as possible.  Submit refill requests through [employer] or call your pharmacy and they will forward the refill request to us. Please allow 3 business days for your refill to be completed.          Additional Information About Your Visit        MyChart Information     [employer] gives you secure access to your electronic health record. If you see a primary care provider, you can also send messages to your care team and make appointments. If you have questions, please call your primary care clinic.  If you do  not have a primary care provider, please call [phone] and they will assist you.        Care EveryWhere ID     This is your Care EveryWhere ID. This could be used by other organizations to access your [facility] medical records  [id number]         Blood Pressure from Last 3 Encounters:   08/17/17 95/67   05/03/16 106/70   04/21/15 110/77    Weight from Last 3 Encounters:   09/21/17 137 lb 8 oz (62.4 kg)   08/17/17 139 lb (63 kg)   05/03/16 143 lb (64.9 kg)              We Performed the Following     FLU VAC, SPLIT VIRUS IM > 3 YO (QUADRIVALENT) [24313]     Vaccine Administration, Initial [05712]        Primary Care Provider Office Phone # Fax #    Talia Soto [phone]       [address] [facility] [address]        Equal Access to Services     LAVELL CROWELL : Hadii aad ku hadasho Sogadiel, waaxda luqadaha, qaybta kaalmada adeegyada, corey hinojosa . So [facility] [phone].    ATENCIÓN: Si habla español, tiene a tony disposición servicios gratuitos de asistencia lingüística. Llame al [phone].    We comply with applicable federal civil rights laws and [place] laws. We do not discriminate on the basis of race, color, national origin, age, disability, sex, sexual orientation, or gender identity.            Thank you!     Thank you for choosing [facility]  for your care. Our goal is always to provide you with excellent care. Hearing back from our patients is one way we can continue to improve our services. Please take a few minutes to complete the written survey that you may receive in the mail after your visit with us. Thank you!             Your Updated Medication List - Protect others around you: Learn how to safely use, store and throw away your medicines at www.disposemymeds.org.          This list is accurate as of: 10/3/17 12:15 PM.  Always use your most recent med list.                   Brand Name Dispense Instructions  for use Diagnosis    ACE/ARB NOT PRESCRIBED (INTENTIONAL)      Please choose reason not prescribed, below    Type 2 diabetes mellitus without complication, without long-term current use of insulin (H)       albuterol 108 (90 BASE) MCG/ACT Inhaler    PROAIR HFA/PROVENTIL HFA/VENTOLIN HFA    3 Inhaler    Inhale 2 puffs into the lungs every 6 hours as needed for shortness of breath / dyspnea or wheezing    URI (upper respiratory infection), Mild intermittent asthma, Fever       alcohol swab prep pads     100 each    Use to swab area of injection/santosh as directed.    Type 2 diabetes mellitus without complication, without long-term current use of insulin (H)       * atorvastatin 10 MG tablet    LIPITOR    90 tablet    Take 1 tablet (10 mg) by mouth daily    Hyperlipidemia LDL goal <100       * atorvastatin 20 MG tablet    LIPITOR    90 tablet    Take 1 tablet (20 mg) by mouth daily    Hyperlipidemia LDL goal <100, Type 2 diabetes mellitus without complication, without long-term current use of insulin (H)       blood glucose calibration solution    NO BRAND SPECIFIED    1 Bottle    To accompany: Blood Glucose Monitor Brands: per insurance.    Type 2 diabetes mellitus without complication, without long-term current use of insulin (H)       blood glucose monitoring meter device kit    no brand specified    1 kit    Use to test blood sugar 1 times daily or as directed. Preferred blood glucose meter OR supplies to accompany: Blood Glucose Monitor Brands: per insurance.    Type 2 diabetes mellitus without complication, without long-term current use of insulin (H)       blood glucose monitoring test strip    no brand specified    100 strip    Use to test blood sugar 1 times daily or as directed. To accompany: Blood Glucose Monitor Brands: per insurance.    Type 2 diabetes mellitus without complication, without long-term current use of insulin (H)       fluticasone 50 MCG/ACT spray    FLONASE    16 g    Spray 1-2 sprays into  both nostrils daily    Post-nasal drip       thin lancets    NO BRAND SPECIFIED    100 each    Use with lanceting device. To accompany: Blood Glucose Monitor Brands: per insurance.    Type 2 diabetes mellitus without complication, without long-term current use of insulin (H)       triamcinolone 0.1 % cream    KENALOG    15 g    Apply sparingly to affected area three times daily for 14 days.    Eczema, unspecified eczema       * Notice:  This list has 2 medication(s) that are the same as other medications prescribed for you. Read the directions carefully, and ask your doctor or other care provider to review them with you.

## 2017-10-19 ENCOUNTER — TELEPHONE (OUTPATIENT)
Dept: FAMILY MEDICINE | Facility: CLINIC | Age: 57
End: 2017-10-19

## 2017-10-19 NOTE — TELEPHONE ENCOUNTER
Date forms retrieved from team basket: 10-19-17  Were forms completed/signed:  no.  If no, what steps need to be done: spoke with patient and confirmed that this is junk mail.    Spoke with [facility] and informed.  Shredding form.

## 2017-10-26 ENCOUNTER — ALLIED HEALTH/NURSE VISIT (OUTPATIENT)
Dept: NUTRITION | Facility: CLINIC | Age: 57
End: 2017-10-26
Payer: COMMERCIAL

## 2017-10-26 VITALS — BODY MASS INDEX: 26.91 KG/M2 | WEIGHT: 135.5 LBS

## 2017-10-26 DIAGNOSIS — E11.9 TYPE 2 DIABETES MELLITUS WITHOUT COMPLICATION, WITHOUT LONG-TERM CURRENT USE OF INSULIN (H): Primary | ICD-10-CM

## 2017-10-26 PROCEDURE — G0108 DIAB MANAGE TRN  PER INDIV: HCPCS

## 2017-10-26 NOTE — PATIENT INSTRUCTIONS
Continue with healthy, balanced meals - include carbohydrate in each meal    Choose heart healthy fats most often - olive and canola oils, avocados, nuts, seeds, fish    Continue to exercise daily - increase, as you are able - aim for 150 minutes each week cardiovascular activity and 2-3 times a week with strength training exercise    Follow-up on Thursday, November 30 at 9:30 am

## 2017-10-26 NOTE — MR AVS SNAPSHOT
After Visit Summary   10/26/2017    Karyn Ly    MRN: [id number]           Patient Information     Date Of Birth          1960        Visit Information        Provider Department      10/26/2017 9:30 AM CP NUTRITION RESOURCE [facility]        Care Instructions    Continue with healthy, balanced meals - include carbohydrate in each meal    Choose heart healthy fats most often - olive and canola oils, avocados, nuts, seeds, fish    Continue to exercise daily - increase, as you are able - aim for 150 minutes each week cardiovascular activity and 2-3 times a week with strength training exercise    Follow-up on Thursday, November 30 at 9:30 am          Follow-ups after your visit        Your next 10 appointments already scheduled     Nov 30, 2017  9:30 AM CST   Diabetic Education with CP NUTRITION RESOURCE   [facility] ([facility])    [address] [facility] [address] [phone]            Apr 19, 2018  1:30 PM CDT   New Visit with Montana Lou MD   [facility] ([address] [phone] [phone]              Who to contact     If you have questions or need follow up information about today's clinic visit or your schedule please contact [facility] directly at [phone].  Normal or non-critical lab and imaging results will be communicated to you by MyChart, letter or phone within 4 business days after the clinic has received the results. If you do not hear from us within 7 days, please contact the clinic through MyChart or phone. If you have a critical or abnormal lab result, we will notify you by phone as soon as possible.  Submit refill requests through [employer] or call your pharmacy and they will forward the refill request to us. Please allow 3 business days for your refill to be completed.           Additional Information About Your Visit        MyChart Information     [employer] gives you secure access to your electronic health record. If you see a primary care provider, you can also send messages to your care team and make appointments. If you have questions, please call your primary care clinic.  If you do not have a primary care provider, please call [phone] and they will assist you.        Care EveryWhere ID     This is your Care EveryWhere ID. This could be used by other organizations to access your [facility] medical records  [id number]        Your Vitals Were     BMI (Body Mass Index)                   26.91 kg/m2            Blood Pressure from Last 3 Encounters:   08/17/17 95/67   05/03/16 106/70   04/21/15 110/77    Weight from Last 3 Encounters:   10/26/17 61.5 kg (135 lb 8 oz)   09/21/17 62.4 kg (137 lb 8 oz)   08/17/17 63 kg (139 lb)              Today, you had the following     No orders found for display       Primary Care Provider Office Phone # Fax #    Talia Soto [phone]       [address] [facility] [address]        Equal Access to Services     [facility]: Hadii aad ku hadasho Soomaali, waaxda luqadaha, qaybta kaalmada adeegyada, corey hinojosa . So [facility] [phone].    ATENCIÓN: Si habla español, tiene a tony disposición servicios gratuitos de asistencia lingüística. Llame al [phone].    We comply with applicable federal civil rights laws and [place] laws. We do not discriminate on the basis of race, color, national origin, age, disability, sex, sexual orientation, or gender identity.            Thank you!     Thank you for choosing [facility]  for your care. Our goal is always to provide you with excellent care. Hearing back from our patients is one way we can continue to improve our services. Please take a few minutes to complete the written survey that you may receive in the mail  after your visit with us. Thank you!             Your Updated Medication List - Protect others around you: Learn how to safely use, store and throw away your medicines at www.disposemymeds.org.          This list is accurate as of: 10/26/17 10:09 AM.  Always use your most recent med list.                   Brand Name Dispense Instructions for use Diagnosis    ACE/ARB/ARNI NOT PRESCRIBED (INTENTIONAL)      Please choose reason not prescribed, below    Type 2 diabetes mellitus without complication, without long-term current use of insulin (H)       albuterol 108 (90 BASE) MCG/ACT Inhaler    PROAIR HFA/PROVENTIL HFA/VENTOLIN HFA    3 Inhaler    Inhale 2 puffs into the lungs every 6 hours as needed for shortness of breath / dyspnea or wheezing    URI (upper respiratory infection), Mild intermittent asthma, Fever       alcohol swab prep pads     100 each    Use to swab area of injection/santosh as directed.    Type 2 diabetes mellitus without complication, without long-term current use of insulin (H)       * atorvastatin 10 MG tablet    LIPITOR    90 tablet    Take 1 tablet (10 mg) by mouth daily    Hyperlipidemia LDL goal <100       * atorvastatin 20 MG tablet    LIPITOR    90 tablet    Take 1 tablet (20 mg) by mouth daily    Hyperlipidemia LDL goal <100, Type 2 diabetes mellitus without complication, without long-term current use of insulin (H)       blood glucose calibration solution    NO BRAND SPECIFIED    1 Bottle    To accompany: Blood Glucose Monitor Brands: per insurance.    Type 2 diabetes mellitus without complication, without long-term current use of insulin (H)       blood glucose monitoring meter device kit    no brand specified    1 kit    Use to test blood sugar 1 times daily or as directed. Preferred blood glucose meter OR supplies to accompany: Blood Glucose Monitor Brands: per insurance.    Type 2 diabetes mellitus without complication, without long-term current use of insulin (H)       blood glucose  monitoring test strip    no brand specified    100 strip    Use to test blood sugar 1 times daily or as directed. To accompany: Blood Glucose Monitor Brands: per insurance.    Type 2 diabetes mellitus without complication, without long-term current use of insulin (H)       fluticasone 50 MCG/ACT spray    FLONASE    16 g    Spray 1-2 sprays into both nostrils daily    Post-nasal drip       thin lancets    NO BRAND SPECIFIED    100 each    Use with lanceting device. To accompany: Blood Glucose Monitor Brands: per insurance.    Type 2 diabetes mellitus without complication, without long-term current use of insulin (H)       triamcinolone 0.1 % cream    KENALOG    15 g    Apply sparingly to affected area three times daily for 14 days.    Eczema, unspecified eczema       * Notice:  This list has 2 medication(s) that are the same as other medications prescribed for you. Read the directions carefully, and ask your doctor or other care provider to review them with you.

## 2017-10-26 NOTE — PROGRESS NOTES
Diabetes Self Management Training: Follow-up Visit    Karyn Ly presents today for education and evaluation of glucose control related to Type 2 diabetes.    She is accompanied by self    Patient's diabetes management related comments/concerns: went out for dinner one night and had more carbohydrate than usual, but danced for 30 minutes afterward, next morning BG was higher than usual - wonders why    Patient would like this visit to be focused around the following diabetes-related behaviors and goals: Assistance with making lifestyle changes, Self-care behavioral goal setting, Healthy Eating, Monitoring and Reducing Risks    ASSESSMENT:  Patient Problem List reviewed for relevant medical history and current medical status.    Current Diabetes Management per Patient:  Taking diabetes medications? No    Patient glucose self monitoring as follows: 2-3 times daily.     BG results: fasting glucose- 126, 125, 136, 143, 129, 119, 109, 105, 107, 107, 138, 116, 121, 113, 148, 127, 123, 123, 132, 130, 143, 125, 115, 130, 124, 135, 154, 134, 150, 140, 88, 123, pre-lunch glucose- 88, 84, 87, 82, 75, 76, 75, 86, 81, 107, 94, 117, 92, 70, 96, 111, 72, 78, 99, 98, 107, 92, 103, 107, 115; post-lunch glucose- 144, 189 and pre-supper glucose- 100, 95, 76, 100, 93, 127, 110, 175, 101, 121, 88, 111, 137, 98, 110     BG values are: In goal 81% of the time  Patient's most recent   Lab Results   Component Value Date    A1C 6.5 08/15/2017    is meeting goal of <7.0    Nutrition:  Patient eats 3 meals per day    Breakfast - pancake or egg, toast, coffee  Lunch - rice, meat, vegetable OR subway sandwich   Dinner - sushi (xavier rolls), salmon and rice or rice and korean beef   Snacks - ice cream or sweet occasionally    Beverages: Diet soda (1/3 can), Coffee and Water    Cultural/Orthodox diet restrictions: No     Biggest Challenge to Healthy Eating: none    Physical Activity:    Type: walking 3 times around the building -  "increasing duration and speed  Duration: 15-20 minutes  Frequency: 7 days/week    Diabetes Complications:  Discussed reducing CVD risk with heart healthy eating and exercise    Vitals:  Wt 61.5 kg (135 lb 8 oz)  BMI 26.91 kg/m2  Estimated body mass index is 26.91 kg/(m^2) as calculated from the following:    Height as of 8/17/17: 1.511 m (4' 11.5\").    Weight as of this encounter: 61.5 kg (135 lb 8 oz).   Last 3 BP:   BP Readings from Last 3 Encounters:   08/17/17 95/67   05/03/16 106/70   04/21/15 110/77       History   Smoking Status     Never Smoker   Smokeless Tobacco     Never Used       Labs:  Lab Results   Component Value Date    A1C 6.5 08/15/2017     Lab Results   Component Value Date     08/15/2017     Lab Results   Component Value Date     08/15/2017     HDL Cholesterol   Date Value Ref Range Status   08/15/2017 40 (L) >49 mg/dL Final   ]  GFR Estimate   Date Value Ref Range Status   08/15/2017 74 >60 mL/min/1.7m2 Final     Comment:     Non  GFR Calc     GFR Estimate If Black   Date Value Ref Range Status   08/15/2017 90 >60 mL/min/1.7m2 Final     Comment:      GFR Calc     Lab Results   Component Value Date    CR 0.80 08/15/2017     No results found for: MICROALBUMIN    Health Beliefs and Attitudes:   Patient Activation Measure Survey Score:  DEVAN Score (Last Two) 2/6/2014   DEVAN Raw Score 36   Activation Score 47.4   DEVAN Level 2       Stage of Change: ACTION (Actively working towards change)    Progress toward meeting diabetes-related behavioral goals:    GOALS % Met Goal   Healthy Eating 75   Physical Activity 100   Monitoring 100   Medication Taking     Problem Solving     Healthy Coping     Risk Reduction         Diabetes knowledge and skills assessment:     Patient is knowledgeable in diabetes management concepts related to: Healthy Eating, Being Active and Monitoring    Patient needs further education on the following diabetes management concepts: " Problem Solving, Reducing Risks and Healthy Coping    Barriers to Learning Assessment: No Barriers identified    Based on learning assessment above, most appropriate setting for further diabetes education would be: Group class or Individual setting.    INTERVENTION:  Covered Diabetes Education Class 2 content with patient    Educational Topics Discussed  Hyperglycemia & Hypoglycemia Prevention and Symptoms, Why BG rise and fall, Exercise Guidelines, Effect of Stress on BG and Stress Management, Carbohydrate Counting Review, Alcohol, Healthy Eating Guidelines, Heart Healthy Eating (Fats, Fiber)    Additional education provided today on:  AADE Self-Care Behaviors:  Healthy Eating: carbohydrate counting, consistency in amount, composition, and timing of food intake, heart healthy diet, plate planning method and label reading  Being Active: relationship to blood glucose, describe appropriate activity program and precautions to take  Monitoring: log and interpret results, individual blood glucose targets and frequency of monitoring  Problem Solving: high blood glucose - causes, signs/symptoms, treatment and prevention and low blood glucose - causes, signs/symptoms, treatment and prevention  Reducing Risks: reducing risk of heart disease    Opportunities for ongoing education and support in diabetes-self management were discussed.    Pt verbalized understanding of concepts discussed and recommendations provided today.       Education Materials Provided:  BG Log Sheet and Diabetes Education Group Class 2 slides    PLAN:  See Patient Instructions for co-developed, patient-stated behavior change goals.  AVS printed and provided to patient today.    FOLLOW-UP:  Follow-up appointment scheduled on 11/30/17.    Ongoing plan for education and support: Written resources (magazines, books, etc.) and Follow-up visit with diabetes educator in 1 month    Debra Capps, MPH, RD, LD, CDE   Time Spent: 60 minutes  Encounter Type:  Individual

## 2017-11-10 DIAGNOSIS — E78.5 HYPERLIPIDEMIA LDL GOAL <100: ICD-10-CM

## 2017-11-10 DIAGNOSIS — E11.9 TYPE 2 DIABETES MELLITUS WITHOUT COMPLICATION, WITHOUT LONG-TERM CURRENT USE OF INSULIN (H): ICD-10-CM

## 2017-11-10 LAB
ALT SERPL W P-5'-P-CCNC: 27 U/L (ref 0–50)
AST SERPL W P-5'-P-CCNC: 16 U/L (ref 0–45)
CHOLEST SERPL-MCNC: 151 MG/DL
CK SERPL-CCNC: 112 U/L (ref 30–225)
HBA1C MFR BLD: 6.4 % (ref 4.3–6)
HDLC SERPL-MCNC: 44 MG/DL
LDLC SERPL CALC-MCNC: 81 MG/DL
NONHDLC SERPL-MCNC: 107 MG/DL
TRIGL SERPL-MCNC: 128 MG/DL

## 2017-11-10 PROCEDURE — 83036 HEMOGLOBIN GLYCOSYLATED A1C: CPT | Performed by: INTERNAL MEDICINE

## 2017-11-10 PROCEDURE — 36415 COLL VENOUS BLD VENIPUNCTURE: CPT | Performed by: INTERNAL MEDICINE

## 2017-11-10 PROCEDURE — 80061 LIPID PANEL: CPT | Performed by: INTERNAL MEDICINE

## 2017-11-10 PROCEDURE — 82550 ASSAY OF CK (CPK): CPT | Performed by: INTERNAL MEDICINE

## 2017-11-10 PROCEDURE — 84460 ALANINE AMINO (ALT) (SGPT): CPT | Performed by: INTERNAL MEDICINE

## 2017-11-10 PROCEDURE — 84450 TRANSFERASE (AST) (SGOT): CPT | Performed by: INTERNAL MEDICINE

## 2017-11-10 NOTE — PROGRESS NOTES
Karyn Hairston Malachi    Enclosed are your results.  Your labs are normal/stable at this time.   There is a big improvement in the cholesterol.     Please call  with any questions.  We can also discuss any questions regarding these labs at your next scheduled visit.    Sincerely,    Talia Soto M.D.

## 2017-11-16 DIAGNOSIS — E11.9 TYPE 2 DIABETES MELLITUS WITHOUT COMPLICATION, WITHOUT LONG-TERM CURRENT USE OF INSULIN (H): ICD-10-CM

## 2017-11-17 RX ORDER — DIPHENHYD/PE/ACETAMINOPHEN/GG 25-5-325MG
TABLET, SEQUENTIAL ORAL
Qty: 100 EACH | Refills: 2 | Status: SHIPPED | OUTPATIENT
Start: 2017-11-17 | End: 2019-07-08

## 2017-11-17 NOTE — TELEPHONE ENCOUNTER
Prescription approved per [facility] Refill Protocol.    Pippa Andersen RN  [facility]

## 2017-11-17 NOTE — TELEPHONE ENCOUNTER
Requested Prescriptions   Pending Prescriptions Disp Refills     Alcohol Swabs ([facility] ALCOHOL PREP PADS) 70 % PADS [Pharmacy Med Name: [facility] ALCOHOL 70% PREP PADS]  2     Sig: USE TO SWAB AREA OF INJECTION/MARY AS DIRECTED.    There is no refill protocol information for this order

## 2017-11-30 ENCOUNTER — ALLIED HEALTH/NURSE VISIT (OUTPATIENT)
Dept: EDUCATION SERVICES | Facility: CLINIC | Age: 57
End: 2017-11-30
Payer: COMMERCIAL

## 2017-11-30 ENCOUNTER — OFFICE VISIT (OUTPATIENT)
Dept: FAMILY MEDICINE | Facility: CLINIC | Age: 57
End: 2017-11-30
Payer: COMMERCIAL

## 2017-11-30 VITALS
WEIGHT: 137 LBS | OXYGEN SATURATION: 96 % | BODY MASS INDEX: 27.21 KG/M2 | HEART RATE: 67 BPM | TEMPERATURE: 97.8 F | SYSTOLIC BLOOD PRESSURE: 95 MMHG | DIASTOLIC BLOOD PRESSURE: 65 MMHG

## 2017-11-30 DIAGNOSIS — Z13.5 SCREENING FOR DIABETIC RETINOPATHY: ICD-10-CM

## 2017-11-30 DIAGNOSIS — L98.9 LESION OF SKIN OF SCALP: ICD-10-CM

## 2017-11-30 DIAGNOSIS — E11.9 TYPE 2 DIABETES MELLITUS WITHOUT COMPLICATION, WITHOUT LONG-TERM CURRENT USE OF INSULIN (H): Primary | ICD-10-CM

## 2017-11-30 DIAGNOSIS — Z13.89 SCREENING FOR DIABETIC PERIPHERAL NEUROPATHY: ICD-10-CM

## 2017-11-30 DIAGNOSIS — B07.0 PLANTAR WARTS: ICD-10-CM

## 2017-11-30 PROCEDURE — 99214 OFFICE O/P EST MOD 30 MIN: CPT | Performed by: INTERNAL MEDICINE

## 2017-11-30 PROCEDURE — 99207 C FOOT EXAM  NO CHARGE: CPT | Mod: 25 | Performed by: INTERNAL MEDICINE

## 2017-11-30 PROCEDURE — G0108 DIAB MANAGE TRN  PER INDIV: HCPCS

## 2017-11-30 RX ORDER — LISINOPRIL 2.5 MG/1
2.5 TABLET ORAL DAILY
Qty: 90 TABLET | Refills: 3 | Status: SHIPPED | OUTPATIENT
Start: 2017-11-30 | End: 2018-05-25

## 2017-11-30 ASSESSMENT — PAIN SCALES - GENERAL: PAINLEVEL: NO PAIN (0)

## 2017-11-30 NOTE — PATIENT INSTRUCTIONS
"\"shellfish allergy\"  -- avoid all shellfish    Start lisinopril 2.5 mg daily .  This is to protect kidneys.  Watch for dizziness/dry cough (3%)    Return to clinic 6 months (summer '18) for annual physical and diabetes recheck (non fasting labs prior)     [facility]  [place] [phone]   [email or web address]  "

## 2017-11-30 NOTE — MR AVS SNAPSHOT
After Visit Summary   11/30/2017    Karyn Ly    MRN: [id number]           Patient Information     Date Of Birth          1960        Visit Information        Provider Department      11/30/2017 9:30 AM CP NUTRITION RESOURCE [facility]        Care Instructions    Continue with healthy eating    Continue with physical activity - 20-30 minutes daily    Continue to check blood sugars up to 3 times a day    Follow-up on Thursday, March 22 at 9:30 am    Recommend the following to help reduce further complications of diabetes:    1. Maintain blood pressure of less than or equal to 130/80. My most recent BP was 95/64 on 11/30/2017. If elevated, consider getting equipment to test your BP at home, or go to your local clinic or grocery store to check it often.    2. Get your blood lipids checked at least once/year at your clinic  My blood lipids were last checked on 11/10/17.    3. Maintain an A1c of < 7%. My most recently A1c was 6.4% on 11/10/17. A1c if outside of goal can be checked every 3-6 months.     4. Your doctor will test your urine for protein once/year to make sure your kidneys are functioning properly     5. Visit the eye doctor yearly or more. Request a diabetic eye exam      6. Perform a daily foot exam and look for- changes in color, signs of infection, damaged or thick toenails, blisters, cracking or peeling. Have your doctor perform a foot exam each time you go to the clinic. Consider seeing a podiatrist     7. Maintain proper dental hygiene. Brush and floss daily and be sure to see the dentist twice/year. Inform your dentist of your diabetes     8. Get an annual flu shot and a pneumonia shot at least once to decrease risk of infection/illness          Follow-ups after your visit        Your next 10 appointments already scheduled     Mar 22, 2018  9:30 AM CDT   Diabetic Education with CP NUTRITION RESOURCE   [facility]  [facility])    [address] [address] [phone]            Apr 19, 2018  1:30 PM CDT   New Visit with Montana Lou MD   [facility] ([facility])    [address]  [place] [address] [phone]              Future tests that were ordered for you today     Open Future Orders        Priority Expected Expires Ordered    Basic metabolic panel Routine  11/29/2018 11/30/2017    Hemoglobin A1c Routine  11/30/2018 11/30/2017    Albumin Random Urine Quantitative with Creat Ratio Routine  11/30/2018 11/30/2017            Who to contact     If you have questions or need follow up information about today's clinic visit or your schedule please contact [facility] directly at [phone].  Normal or non-critical lab and imaging results will be communicated to you by MyChart, letter or phone within 4 business days after the clinic has received the results. If you do not hear from us within 7 days, please contact the clinic through [facility] or phone. If you have a critical or abnormal lab result, we will notify you by phone as soon as possible.  Submit refill requests through [facility] or call your pharmacy and they will forward the refill request to us. Please allow 3 business days for your refill to be completed.          Additional Information About Your Visit        MyChart Information     Resolver gives you secure access to your electronic health record. If you see a primary care provider, you can also send messages to your care team and make appointments. If you have questions, please call your primary care clinic.  If you do not have a primary care provider, please call [phone] and they will assist you.        Care EveryWhere ID     This is your Care EveryWhere ID. This could be used by other organizations to access your [place] medical records  [id number]         Blood Pressure from Last 3  Encounters:   11/30/17 95/65   08/17/17 95/67   05/03/16 106/70    Weight from Last 3 Encounters:   11/30/17 62.1 kg (137 lb)   10/26/17 61.5 kg (135 lb 8 oz)   09/21/17 62.4 kg (137 lb 8 oz)              Today, you had the following     No orders found for display         Today's Medication Changes          These changes are accurate as of: 11/30/17 10:25 AM.  If you have any questions, ask your nurse or doctor.               Start taking these medicines.        Dose/Directions    lisinopril 2.5 MG tablet   Commonly known as:  PRINIVIL/Zestril   Used for:  Type 2 diabetes mellitus without complication, without long-term current use of insulin (H)   Started by:  Talia Stoo MD        Dose:  2.5 mg   Take 1 tablet (2.5 mg) by mouth daily   Quantity:  90 tablet   Refills:  3         These medicines have changed or have updated prescriptions.        Dose/Directions    atorvastatin 20 MG tablet   Commonly known as:  LIPITOR   This may have changed:  Another medication with the same name was removed. Continue taking this medication, and follow the directions you see here.   Used for:  Hyperlipidemia LDL goal <100, Type 2 diabetes mellitus without complication, without long-term current use of insulin (H)   Changed by:  Talia Soto MD        Dose:  20 mg   Take 1 tablet (20 mg) by mouth daily   Quantity:  90 tablet   Refills:  3         Stop taking these medicines if you haven't already. Please contact your care team if you have questions.     ACE/ARB/ARNI NOT PRESCRIBED (INTENTIONAL)   Stopped by:  Talia Soto MD                Where to get your medicines      [id number] [place], [place] - [address]  [address] [address]     Phone:  [phone]     lisinopril 2.5 MG tablet                Primary Care Provider Office Phone # Fax #    Talia Soto [phone] [address]  [address]  [facility] [address]        Equal Access to Services     LAVELL CROWELL : Hadii willa ku hadnachoo Solianaali, waaxda luqadaha, qaybta kaalmabella villalta, corey stewartjuan carloskesha valero. So [facility] [phone].    ATENCIÓN: Si habla español, tiene a tony disposición servicios gratuitos de asistencia lingüística. Andersoname al [phone].    We comply with applicable federal civil rights laws and [place] laws. We do not discriminate on the basis of race, color, national origin, age, disability, sex, sexual orientation, or gender identity.            Thank you!     Thank you for choosing [facility]  for your care. Our goal is always to provide you with excellent care. Hearing back from our patients is one way we can continue to improve our services. Please take a few minutes to complete the written survey that you may receive in the mail after your visit with us. Thank you!             Your Updated Medication List - Protect others around you: Learn how to safely use, store and throw away your medicines at www.disposemymeds.org.          This list is accurate as of: 11/30/17 10:25 AM.  Always use your most recent med list.                   Brand Name Dispense Instructions for use Diagnosis    albuterol 108 (90 BASE) MCG/ACT Inhaler    PROAIR HFA/PROVENTIL HFA/VENTOLIN HFA    3 Inhaler    Inhale 2 puffs into the lungs every 6 hours as needed for shortness of breath / dyspnea or wheezing    URI (upper respiratory infection), Mild intermittent asthma, Fever       * alcohol swab prep pads     100 each    Use to swab area of injection/mary as directed.    Type 2 diabetes mellitus without complication, without long-term current use of insulin (H)       * [facility] ALCOHOL PREP PADS 70 % Pads     100 each    USE TO SWAB AREA OF INJECTION/MARY AS DIRECTED.    Type 2 diabetes mellitus without complication, without long-term current use of insulin (H)       atorvastatin 20 MG tablet    LIPITOR    90  tablet    Take 1 tablet (20 mg) by mouth daily    Hyperlipidemia LDL goal <100, Type 2 diabetes mellitus without complication, without long-term current use of insulin (H)       blood glucose calibration solution    NO BRAND SPECIFIED    1 Bottle    To accompany: Blood Glucose Monitor Brands: per insurance.    Type 2 diabetes mellitus without complication, without long-term current use of insulin (H)       blood glucose monitoring meter device kit    no brand specified    1 kit    Use to test blood sugar 1 times daily or as directed. Preferred blood glucose meter OR supplies to accompany: Blood Glucose Monitor Brands: per insurance.    Type 2 diabetes mellitus without complication, without long-term current use of insulin (H)       blood glucose monitoring test strip    no brand specified    100 strip    Use to test blood sugar 1 times daily or as directed. To accompany: Blood Glucose Monitor Brands: per insurance.    Type 2 diabetes mellitus without complication, without long-term current use of insulin (H)       fluticasone 50 MCG/ACT spray    FLONASE    16 g    Spray 1-2 sprays into both nostrils daily    Post-nasal drip       lisinopril 2.5 MG tablet    PRINIVIL/Zestril    90 tablet    Take 1 tablet (2.5 mg) by mouth daily    Type 2 diabetes mellitus without complication, without long-term current use of insulin (H)       thin lancets    NO BRAND SPECIFIED    100 each    Use with lanceting device. To accompany: Blood Glucose Monitor Brands: per insurance.    Type 2 diabetes mellitus without complication, without long-term current use of insulin (H)       triamcinolone 0.1 % cream    KENALOG    15 g    Apply sparingly to affected area three times daily for 14 days.    Eczema, unspecified eczema       * Notice:  This list has 2 medication(s) that are the same as other medications prescribed for you. Read the directions carefully, and ask your doctor or other care provider to review them with you.

## 2017-11-30 NOTE — PATIENT INSTRUCTIONS
Continue with healthy eating    Continue with physical activity - 20-30 minutes daily    Continue to check blood sugars up to 3 times a day    Follow-up on Thursday, March 22 at 9:30 am    Recommend the following to help reduce further complications of diabetes:    1. Maintain blood pressure of less than or equal to 130/80. My most recent BP was 95/64 on 11/30/2017. If elevated, consider getting equipment to test your BP at home, or go to your local clinic or grocery store to check it often.    2. Get your blood lipids checked at least once/year at your clinic  My blood lipids were last checked on 11/10/17.    3. Maintain an A1c of < 7%. My most recently A1c was 6.4% on 11/10/17. A1c if outside of goal can be checked every 3-6 months.     4. Your doctor will test your urine for protein once/year to make sure your kidneys are functioning properly     5. Visit the eye doctor yearly or more. Request a diabetic eye exam      6. Perform a daily foot exam and look for- changes in color, signs of infection, damaged or thick toenails, blisters, cracking or peeling. Have your doctor perform a foot exam each time you go to the clinic. Consider seeing a podiatrist     7. Maintain proper dental hygiene. Brush and floss daily and be sure to see the dentist twice/year. Inform your dentist of your diabetes     8. Get an annual flu shot and a pneumonia shot at least once to decrease risk of infection/illness

## 2017-11-30 NOTE — NURSING NOTE
"Chief Complaint   Patient presents with     Lipids     Health Maintenance     foot,eye     *_* Health Care Directive *_*       Initial BP 95/65 (BP Location: Right arm, Patient Position: Chair, Cuff Size: Adult Regular)  Pulse 67  Temp 97.8  F (36.6  C) (Oral)  Wt 137 lb (62.1 kg)  SpO2 96%  Breastfeeding? No  BMI 27.21 kg/m2 Estimated body mass index is 27.21 kg/(m^2) as calculated from the following:    Height as of 8/17/17: 4' 11.5\" (1.511 m).    Weight as of this encounter: 137 lb (62.1 kg).  Medication Reconciliation: complete   Jessi Milan CMA      "

## 2017-11-30 NOTE — PROGRESS NOTES
Diabetes Self Management Training: Follow-up Visit    Karyn Ly presents today for education and evaluation of glucose control related to Type 2 diabetes.    She is accompanied by self    Patient's diabetes management related comments/concerns: Things are going well overall. Reports she had an allergic reaction to crab over the weekend and her sister who is a MD prescribed prednisone and BG went in the 300's; feels that she has more energy lately than; feeling like physically activity is becoming a habit, if she realizes in the evening that she hasn't done her walking, she'll be sure to do some walking.      Patient would like this visit to be focused around the following diabetes-related behaviors and goals: Self-care behavioral goal setting, Healthy Eating, Being Active and Reducing Risks    ASSESSMENT:  Patient Problem List reviewed for relevant medical history and current medical status.    BG are generally well managed and in goal most of the time. Noted one BG <70, not at risk of dangerous hypoglycemia as she is not taking any glucose lowering medications. She did not feel different with that result. Patient is following meal plan and getting regular exercise. Noted that aspirin is not prescribed and not indicated as intentionally not prescribed in medication list - message to MD to address.     Current Diabetes Management per Patient:  Taking diabetes medications? no     *Abbreviated insulin dose documentation key: Insulin Trade Name ([id number]-mqpgx-tnnear-bedtime) - i.e. Humalog 5-5-5-0 (Humalog 5 units at breakfast, 5 units at lunch, and 5 units at dinner).    Patient glucose self monitoring as follows: 2-3 times daily.     BG results: fasting glucose- 121, 119, 122, 126, 134, 121, 189*, 114, 133, pre-lunch glucose- 82, 67, 78, 91, 315*, 122, 86 and pre-supper glucose- 110, 118, 123, 118, 126, 101, 79    *indicates day that she took prednisone for allergic reaction     BG values are: In  "goal  Patient's most recent   Lab Results   Component Value Date    A1C 6.4 11/10/2017    is meeting goal of <7.0    Nutrition:  Patient eats 3 meals per day    Breakfast - pancake and fruit OR cereal OR egg and toast  Lunch - pasta OR chipotle (1/2) or soup   Dinner - fish OR pasta OR soup  Snacks - crackers    Beverages: Pop (Diet), Coffee and Water    Cultural/Denominational diet restrictions: No     Biggest Challenge to Healthy Eating: none    Physical Activity:    Type: walking with some jogging - increasing amount of time she is jogging; takes stairs instead of the elevator now  Duration: 20-30 minutes, may split into 2 times/day (20 minutes morning, 10 minutes evening)  Frequency: every day  Limitations: none    Diabetes Complications:  Chronic Complication Prevention: Eyes: exam within in the last year? Yes  Heart Health: BP to goal No, LDL to goal Yes, Daily Aspirin No    Vitals:  Estimated body mass index is 27.21 kg/(m^2) as calculated from the following:    Height as of 8/17/17: 1.511 m (4' 11.5\").    Weight as of an earlier encounter on 11/30/17: 62.1 kg (137 lb).   Last 3 BP:   BP Readings from Last 3 Encounters:   11/30/17 95/65   08/17/17 95/67   05/03/16 106/70       History   Smoking Status     Never Smoker   Smokeless Tobacco     Never Used       Labs:  Lab Results   Component Value Date    A1C 6.4 11/10/2017     Lab Results   Component Value Date     08/15/2017     Lab Results   Component Value Date    LDL 81 11/10/2017     HDL Cholesterol   Date Value Ref Range Status   11/10/2017 44 (L) >49 mg/dL Final   ]  GFR Estimate   Date Value Ref Range Status   08/15/2017 74 >60 mL/min/1.7m2 Final     Comment:     Non  GFR Calc     GFR Estimate If Black   Date Value Ref Range Status   08/15/2017 90 >60 mL/min/1.7m2 Final     Comment:      GFR Calc     Lab Results   Component Value Date    CR 0.80 08/15/2017     No results found for: MICROALBUMIN    Health Beliefs and " Attitudes:   Patient Activation Measure Survey Score:  DEVAN Score (Last Two) 2/6/2014   DEVAN Raw Score 36   Activation Score 47.4   DEVAN Level 2       Stage of Change: ACTION (Actively working towards change)    Progress toward meeting diabetes-related behavioral goals:    GOALS % Met Goal   Healthy Eating 100   Physical Activity 100   Monitoring 100   Medication Taking     Problem Solving     Healthy Coping     Risk Reduction         Diabetes knowledge and skills assessment:     Patient is knowledgeable in diabetes management concepts related to: Healthy Eating, Being Active, Monitoring, Taking Medication and Healthy Coping    Patient needs further education on the following diabetes management concepts: Problem Solving and Reducing Risks    Barriers to Learning Assessment: No Barriers identified    Based on learning assessment above, most appropriate setting for further diabetes education would be: Group class or Individual setting.    INTERVENTION:    Education provided today on:  AADE Self-Care Behaviors:  Healthy Eating: consistency in amount, composition, and timing of food intake, weight reduction, heart healthy diet, eating out, portion control and plate planning method  Being Active: relationship to blood glucose and describe appropriate activity program  Taking Medication: discussed generally that there are a variety of medications available for managing blood sugars should her body not be able to manage with diet and exercise alone in the future - discussed that need for medication is not a failure of lifestyle, but indicative of progressive nature of diabetes  Problem Solving: high blood glucose - causes, signs/symptoms, treatment and prevention, when to call health care provider and sick day arrangements  Reducing Risks: major complications of diabetes, prevention, early diagnostic measures and treatment of complications, foot care, appropriate dental care, annual eye exam, aspirin therapy, A1C - goals,  relating to blood glucose levels, how often to check, lipids levels and goals and blood pressure and goals  Healthy Coping: benefits of making appropriate lifestyle changes and utilize support systems    Opportunities for ongoing education and support in diabetes-self management were discussed.    Pt verbalized understanding of concepts discussed and recommendations provided today.       Education Materials Provided:  [employer] Taking Charge of Your Diabetes Book, BG Log Sheet and PowerPoint slides for Diabetes Group Class #3 (Reducing Risks)    PLAN:  See Patient Instructions for co-developed, patient-stated behavior change goals.  AVS printed and provided to patient today.    FOLLOW-UP:  Patient has completed diabetes education curriculum.   Follow-up appointment scheduled on 3/22/17.  Chart routed to referring provider.    Ongoing plan for education and support: Written resources (magazines, books, etc.), Follow-up visit with diabetes educator in 4 months and Follow-up with primary care provider as directed.    Debra Capps, MPH, RD, LD, CDE   Time Spent: 60 minutes  Encounter Type: Individual

## 2017-11-30 NOTE — PROGRESS NOTES
SUBJECTIVE:   Karyn Ly is a 57 year old female who presents to clinic today for the following health issues:    55 y/o F here for AFE.  Her screening labs revealed  borderline DM this past summer (A1C 6.5) and hyperlipidemia.  Started DM education.     .  On statin and tolerating well.  Recent A1C is now at 6.4, cholesterol at goal (LDL 81, statin).      Hyperlipidemia Follow-Up      Rate your low fat/cholesterol diet?: good    Taking statin?  Yes, no muscle aches from statin    Other lipid medications/supplements?:  none        Amount of exercise or physical activity: 6-7 days/week for an average of 15-30 minutes    Problems taking medications regularly: No    Medication side effects: none    Diet: low salt and low fat/cholesterol      Follow up on blood sugars    Problem list and histories reviewed & adjusted, as indicated.  Additional history: as documented    Patient Active Problem List   Diagnosis     Hyperlipidemia LDL goal <100     Mild intermittent asthma     Past Surgical History:   Procedure Laterality Date     COLONOSCOPY  2/17/2014    Procedure: COLONOSCOPY;  colon-screening / vandana;  Surgeon: Donovan Dinero MD;  Location:  OR       Social History   Substance Use Topics     Smoking status: Never Smoker     Smokeless tobacco: Never Used     Alcohol use 0.0 oz/week     0 Standard drinks or equivalent per week      Comment: occasional      Family History   Problem Relation Age of Onset     Hypertension Mother      DIABETES Father      Glaucoma Father      Lipids Brother      Macular Degeneration No family hx of          Current Outpatient Prescriptions   Medication Sig Dispense Refill     ACE/ARB/ARNI NOT PRESCRIBED, INTENTIONAL, Please choose reason not prescribed, below       Alcohol Swabs ([employer] ALCOHOL PREP PADS) 70 % PADS USE TO SWAB AREA OF INJECTION/MARY AS DIRECTED. 100 each 2     atorvastatin (LIPITOR) 20 MG tablet Take 1 tablet (20 mg) by mouth daily 90 tablet 3      blood glucose monitoring (NO BRAND SPECIFIED) meter device kit Use to test blood sugar 1 times daily or as directed. Preferred blood glucose meter OR supplies to accompany: Blood Glucose Monitor Brands: per insurance. 1 kit 0     blood glucose monitoring (NO BRAND SPECIFIED) test strip Use to test blood sugar 1 times daily or as directed. To accompany: Blood Glucose Monitor Brands: per insurance. 100 strip 6     blood glucose calibration (NO BRAND SPECIFIED) solution To accompany: Blood Glucose Monitor Brands: per insurance. 1 Bottle 3     thin (NO BRAND SPECIFIED) lancets Use with lanceting device. To accompany: Blood Glucose Monitor Brands: per insurance. 100 each 3     alcohol swab prep pads Use to swab area of injection/santosh as directed. 100 each 3     triamcinolone (KENALOG) 0.1 % cream Apply sparingly to affected area three times daily for 14 days. 15 g 0     fluticasone (FLONASE) 50 MCG/ACT nasal spray Spray 1-2 sprays into both nostrils daily 16 g 6     albuterol (PROAIR HFA, PROVENTIL HFA, VENTOLIN HFA) 108 (90 BASE) MCG/ACT inhaler Inhale 2 puffs into the lungs every 6 hours as needed for shortness of breath / dyspnea or wheezing 3 Inhaler 1     [DISCONTINUED] atorvastatin (LIPITOR) 10 MG tablet Take 1 tablet (10 mg) by mouth daily 90 tablet 3     No Known Allergies  Recent Labs   Lab Test  11/10/17   0729  08/15/17   0654  08/12/14   0737  02/06/14   0842   A1C  6.4*  6.5*   --    --    LDL  81  189*   --   177*   HDL  44*  40*   --   42*   TRIG  128  193*   --   135   ALT  27   --    --    --    CR   --   0.80   --   0.73   GFRESTIMATED   --   74   --   84   GFRESTBLACK   --   90   --   >90   POTASSIUM   --   3.9   --   4.1   TSH   --   1.34  1.21   --       BP Readings from Last 3 Encounters:   11/30/17 95/65   08/17/17 95/67   05/03/16 106/70    Wt Readings from Last 3 Encounters:   11/30/17 137 lb (62.1 kg)   10/26/17 135 lb 8 oz (61.5 kg)   09/21/17 137 lb 8 oz (62.4 kg)               Labs reviewed  in EPIC      Reviewed and updated as needed this visit by clinical staffTobacco  Allergies  Meds  Problems  Med Hx  Surg Hx  Fam Hx  Soc Hx        Reviewed and updated as needed this visit by Provider         ROS:  Constitutional, HEENT, cardiovascular, pulmonary, gi and gu systems are negative, except as otherwise noted.    Patient had crab for dinner and developed hives.  Got prednisone from her sister, sugar was up to 300.      Skin lesion on scalp is bothersome, gets caught on comb, no bleeding that she knows of.       OBJECTIVE:   BP 95/65 (BP Location: Right arm, Patient Position: Chair, Cuff Size: Adult Regular)  Pulse 67  Temp 97.8  F (36.6  C) (Oral)  Wt 137 lb (62.1 kg)  SpO2 96%  Breastfeeding? No  BMI 27.21 kg/m2  Body mass index is 27.21 kg/(m^2).  GENERAL: healthy, alert and no distress  NECK: no adenopathy, no asymmetry, masses, or scars and thyroid normal to palpation  RESP: lungs clear to auscultation - no rales, rhonchi or wheezes  CV: regular rate and rhythm, normal S1 S2, no S3 or S4, no murmur, click or rub, no peripheral edema and peripheral pulses strong  MS: no gross musculoskeletal defects noted, no edema  SKIN:  Raised multicolored lesion on left parietal scalp  NEURO: Normal strength and tone, mentation intact and speech normal  PSYCH: mentation appears normal, affect normal/bright  FEET: both sides normal; no swelling, tenderness or skin or vascular lesions.  Sensation is intact. Peripheral pulses are palpable. Toenails are ml..   2 plantar warts on left foot, patient reports they are getting smaller, declines treatment at this time.     Diagnostic Test Results:  Results for orders placed or performed in visit on 11/10/17   Hemoglobin A1c   Result Value Ref Range    Hemoglobin A1C 6.4 (H) 4.3 - 6.0 %   Lipid panel reflex to direct LDL   Result Value Ref Range    Cholesterol 151 <200 mg/dL    Triglycerides 128 <150 mg/dL    HDL Cholesterol 44 (L) >49 mg/dL    LDL  "Cholesterol Calculated 81 <100 mg/dL    Non HDL Cholesterol 107 <130 mg/dL   AST   Result Value Ref Range    AST 16 0 - 45 U/L   ALT   Result Value Ref Range    ALT 27 0 - 50 U/L   CK total   Result Value Ref Range    CK Total 112 30 - 225 U/L       ASSESSMENT/PLAN:             ICD-10-CM    1. Type 2 diabetes mellitus without complication, without long-term current use of insulin (H) E11.9 lisinopril (PRINIVIL/ZESTRIL) 2.5 MG tablet     Basic metabolic panel     Hemoglobin A1c     Albumin Random Urine Quantitative with Creat Ratio   2. Plantar warts B07.0    3. Screening for diabetic retinopathy Z13.5    4. Screening for diabetic peripheral neuropathy Z13.89 FOOT EXAM  NO CHARGE [12637.649]     Has upcoming Eye appt with Dr Lou     Updated allergy list \"shellfish\" (see ROS)    Plantar warts; declines treatment    Will start low dose lisinopril.    Side effects discussed and questions answered.     Raised multicolored lesion on left parietal scalp. Derm referral.     Talia Soto MD  [facility]      "

## 2017-11-30 NOTE — MR AVS SNAPSHOT
"              After Visit Summary   11/30/2017    Karyn Ly    MRN: [id number]           Patient Information     Date Of Birth          1960        Visit Information        Provider Department      11/30/2017 7:00 AM Talia Soto MD [facility]        Today's Diagnoses     Type 2 diabetes mellitus without complication, without long-term current use of insulin (H)    -  1    Plantar warts        Screening for diabetic retinopathy        Screening for diabetic peripheral neuropathy        Lesion of skin of scalp          Care Instructions    \"shellfish allergy\"  -- avoid all shellfish    Start lisinopril 2.5 mg daily .  This is to protect kidneys.  Watch for dizziness/dry cough (3%)    Return to clinic 6 months (summer '18) for annual physical and diabetes recheck (non fasting labs prior)     [facility]  [place] [phone]   [email or web address]          Follow-ups after your visit        Additional Services     DERMATOLOGY REFERRAL       Your provider has referred you to: [facility]:    Please be aware that coverage of these services is subject to the terms and limitations of your health insurance plan.  Call member services at your health plan with any benefit or coverage questions.      Please bring the following with you to your appointment:    (1) Any X-Rays, CTs or MRIs which have been performed.  Contact the facility where they were done to arrange for  prior to your scheduled appointment.    (2) List of current medications  (3) This referral request   (4) Any documents/labs given to you for this referral            OPTOMETRY REFERRAL       Your provider has referred you to:  [facility]: [facility] [phone]    [email or web address]    Please be aware that coverage of these services is subject to the terms and limitations of your health insurance plan.  Call member services at " your health plan with any benefit or coverage questions.      Please bring the following to your appointment:  >>   Any x-rays, CTs or MRIs which have been performed.  Contact the facility where they were done to arrange for  prior to your scheduled appointment.  Any new CT, MRI or other procedures ordered by your specialist must be performed at a [facility] or coordinated by your clinic's referral office.    >>   List of current medications   >>   This referral request   >>   Any documents/labs given to you for this referral                  Follow-up notes from your care team     Return in about 6 months (around 5/30/2018) for diabetes follow up, Physical Exam.      Your next 10 appointments already scheduled     Nov 30, 2017  9:30 AM CST   Diabetic Education with CP NUTRITION RESOURCE   [facility]    [address] [id number] [phone]            Apr 19, 2018  1:30 PM CDT   New Visit with Montana Lou MD   [facility]    [address] [place] [id number] [phone]              Future tests that were ordered for you today     Open Future Orders        Priority Expected Expires Ordered    Basic metabolic panel Routine  11/29/2018 11/30/2017    Hemoglobin A1c Routine  11/30/2018 11/30/2017    Albumin Random Urine Quantitative with Creat Ratio Routine  11/30/2018 11/30/2017            Who to contact     If you have questions or need follow up information about today's clinic visit or your schedule please contact [facility] directly at [phone].  Normal or non-critical lab and imaging results will be communicated to you by MyChart, letter or phone within 4 business days after the clinic has received the results. If you do not hear from us within 7 days, please contact the clinic through MyChart or phone. If you have a  critical or abnormal lab result, we will notify you by phone as soon as possible.  Submit refill requests through [employer] or call your pharmacy and they will forward the refill request to us. Please allow 3 business days for your refill to be completed.          Additional Information About Your Visit        [facility] Information     [employer] gives you secure access to your electronic health record. If you see a primary care provider, you can also send messages to your care team and make appointments. If you have questions, please call your primary care clinic.  If you do not have a primary care provider, please call [phone] and they will assist you.        Care EveryWhere ID     This is your Care EveryWhere ID. This could be used by other organizations to access your [facility] medical records  [id number]        Your Vitals Were     Pulse Temperature Pulse Oximetry Breastfeeding? BMI (Body Mass Index)       67 97.8  F (36.6  C) (Oral) 96% No 27.21 kg/m2        Blood Pressure from Last 3 Encounters:   11/30/17 95/65   08/17/17 95/67   05/03/16 106/70    Weight from Last 3 Encounters:   11/30/17 137 lb (62.1 kg)   10/26/17 135 lb 8 oz (61.5 kg)   09/21/17 137 lb 8 oz (62.4 kg)              We Performed the Following     DERMATOLOGY REFERRAL     FOOT EXAM  NO CHARGE [[id number]]     OPTOMETRY REFERRAL          Today's Medication Changes          These changes are accurate as of: 11/30/17  7:49 AM.  If you have any questions, ask your nurse or doctor.               Start taking these medicines.        Dose/Directions    lisinopril 2.5 MG tablet   Commonly known as:  PRINIVIL/Zestril   Used for:  Type 2 diabetes mellitus without complication, without long-term current use of insulin (H)   Started by:  Talia Soto MD        Dose:  2.5 mg   Take 1 tablet (2.5 mg) by mouth daily   Quantity:  90 tablet   Refills:  3         These medicines have changed or have updated prescriptions.        Dose/Directions     atorvastatin 20 MG tablet   Commonly known as:  LIPITOR   This may have changed:  Another medication with the same name was removed. Continue taking this medication, and follow the directions you see here.   Used for:  Hyperlipidemia LDL goal <100, Type 2 diabetes mellitus without complication, without long-term current use of insulin (H)   Changed by:  Talia Soto MD        Dose:  20 mg   Take 1 tablet (20 mg) by mouth daily   Quantity:  90 tablet   Refills:  3         Stop taking these medicines if you haven't already. Please contact your care team if you have questions.     ACE/ARB/ARNI NOT PRESCRIBED (INTENTIONAL)   Stopped by:  Talia Soto MD                Where to get your medicines      These medications were sent to [facility]/pharmacy #[id number] - [place], [place] - [address] [facility]  [address] [facility] [address]     Phone:  [phone]     lisinopril 2.5 MG tablet                Primary Care Provider Office Phone # Fax #    Talia Soto [phone] [address] [facility] [address]        Equal Access to Services     [facility]: Hadii willa cintron hadasho Sogadiel, waaxda luqadaha, qaybta kaalmada adeegyada, corey hinojosa . So [facility] [phone].    ATENCIÓN: Si habla español, tiene a tony disposición servicios gratuitos de asistencia lingüística. Llame al [phone].    We comply with applicable federal civil rights laws and [place] laws. We do not discriminate on the basis of race, color, national origin, age, disability, sex, sexual orientation, or gender identity.            Thank you!     Thank you for choosing [facility]  for your care. Our goal is always to provide you with excellent care. Hearing back from our patients is one way we can continue to improve our services. Please take a few minutes to complete the written survey that you may receive in the mail after your visit  with us. Thank you!             Your Updated Medication List - Protect others around you: Learn how to safely use, store and throw away your medicines at www.disposemymeds.org.          This list is accurate as of: 11/30/17  7:49 AM.  Always use your most recent med list.                   Brand Name Dispense Instructions for use Diagnosis    albuterol 108 (90 BASE) MCG/ACT Inhaler    PROAIR HFA/PROVENTIL HFA/VENTOLIN HFA    3 Inhaler    Inhale 2 puffs into the lungs every 6 hours as needed for shortness of breath / dyspnea or wheezing    URI (upper respiratory infection), Mild intermittent asthma, Fever       * alcohol swab prep pads     100 each    Use to swab area of injection/mary as directed.    Type 2 diabetes mellitus without complication, without long-term current use of insulin (H)       * [employer] ALCOHOL PREP PADS 70 % Pads     100 each    USE TO SWAB AREA OF INJECTION/MARY AS DIRECTED.    Type 2 diabetes mellitus without complication, without long-term current use of insulin (H)       atorvastatin 20 MG tablet    LIPITOR    90 tablet    Take 1 tablet (20 mg) by mouth daily    Hyperlipidemia LDL goal <100, Type 2 diabetes mellitus without complication, without long-term current use of insulin (H)       blood glucose calibration solution    NO BRAND SPECIFIED    1 Bottle    To accompany: Blood Glucose Monitor Brands: per insurance.    Type 2 diabetes mellitus without complication, without long-term current use of insulin (H)       blood glucose monitoring meter device kit    no brand specified    1 kit    Use to test blood sugar 1 times daily or as directed. Preferred blood glucose meter OR supplies to accompany: Blood Glucose Monitor Brands: per insurance.    Type 2 diabetes mellitus without complication, without long-term current use of insulin (H)       blood glucose monitoring test strip    no brand specified    100 strip    Use to test blood sugar 1 times daily or as directed. To accompany: Blood Glucose  Monitor Brands: per insurance.    Type 2 diabetes mellitus without complication, without long-term current use of insulin (H)       fluticasone 50 MCG/ACT spray    FLONASE    16 g    Spray 1-2 sprays into both nostrils daily    Post-nasal drip       lisinopril 2.5 MG tablet    PRINIVIL/Zestril    90 tablet    Take 1 tablet (2.5 mg) by mouth daily    Type 2 diabetes mellitus without complication, without long-term current use of insulin (H)       thin lancets    NO BRAND SPECIFIED    100 each    Use with lanceting device. To accompany: Blood Glucose Monitor Brands: per insurance.    Type 2 diabetes mellitus without complication, without long-term current use of insulin (H)       triamcinolone 0.1 % cream    KENALOG    15 g    Apply sparingly to affected area three times daily for 14 days.    Eczema, unspecified eczema       * Notice:  This list has 2 medication(s) that are the same as other medications prescribed for you. Read the directions carefully, and ask your doctor or other care provider to review them with you.

## 2017-12-07 ENCOUNTER — TELEPHONE (OUTPATIENT)
Dept: FAMILY MEDICINE | Facility: CLINIC | Age: 57
End: 2017-12-07

## 2017-12-07 RX ORDER — ASPIRIN 81 MG/1
81 TABLET ORAL DAILY
Qty: 100 TABLET | Refills: 3 | Status: CANCELLED | OUTPATIENT
Start: 2017-12-07

## 2017-12-07 NOTE — TELEPHONE ENCOUNTER
Call to Karyn to inform of below message from Dr. Soto. No answer, left message that I will send MyChart message and left phone number to call back if she prefers to discuss over the phone.    Debra Capps, MPH, RD, LD, CDE

## 2017-12-07 NOTE — TELEPHONE ENCOUNTER
B[employer] message sent with the following information:     After we met last week, I checked with Dr. Soto about having you start a daily 81 mg aspirin, as this is considered a standard of care for diabetes management. Dr. Soto would like you to begin taking an enteric-coated 81 mg aspirin each day. If you are in agreement, we can send a prescription to your pharmacy. Please let me know if you are willing to begin taking this and we will send the prescription.      Debra Capps, MPH, RD, LD, CDE

## 2017-12-07 NOTE — TELEPHONE ENCOUNTER
Note from MD:    Talia Soto MD Henry, Anna E, RD                   Thank you so much.  Would you ask her when you see her if she is willing to start a baby coated asa?  If so, then I can add it to the list if you can forward to me.  I tried calling her tonight and no answer.            Previous Messages       ----- Message -----      From: Debra Capps RD      Sent: 11/30/2017  10:43 AM        To: MD Cj Rajan,     It doesn't look like Karyn has an aspirin prescribed or that it is intentionally not prescribed per her medication list. Would you like her to begin taking an daily aspirin or note that it is intentionally not prescribed on her med list?     Thanks!   Debra

## 2017-12-11 ENCOUNTER — MYC MEDICAL ADVICE (OUTPATIENT)
Dept: FAMILY MEDICINE | Facility: CLINIC | Age: 57
End: 2017-12-11

## 2017-12-11 DIAGNOSIS — E11.9 TYPE 2 DIABETES MELLITUS WITHOUT COMPLICATION, WITHOUT LONG-TERM CURRENT USE OF INSULIN (H): Primary | ICD-10-CM

## 2017-12-11 RX ORDER — LOSARTAN POTASSIUM 25 MG/1
12.5 TABLET ORAL DAILY
Qty: 45 TABLET | Refills: 3 | Status: SHIPPED | OUTPATIENT
Start: 2017-12-11 | End: 2018-12-05

## 2017-12-11 NOTE — TELEPHONE ENCOUNTER
Please see MyChart message below.  Patient is experiencing SE from Lisinopril and would like an alternate medication called in.  Pharmacy pended.    Routed to PCP.    Pippa Andersen RN  [facility]

## 2017-12-12 ENCOUNTER — TELEPHONE (OUTPATIENT)
Dept: FAMILY MEDICINE | Facility: CLINIC | Age: 57
End: 2017-12-12

## 2017-12-12 NOTE — TELEPHONE ENCOUNTER
Forms received from: [facility]   Phone number listed: [phone]   Fax listed: [phone]  Date received: 12/12/2017  Form description: Diabetic Meter and supplies  Once forms are completed, please return to [facility] via fax.  Is patient requesting to be contacted when forms are completed: NA    Form placed: In provider's basket  Valentina Cm

## 2018-01-30 ENCOUNTER — TELEPHONE (OUTPATIENT)
Dept: FAMILY MEDICINE | Facility: CLINIC | Age: 58
End: 2018-01-30

## 2018-01-30 NOTE — TELEPHONE ENCOUNTER
Forms received from: [facility]   Phone number listed: [phone]   Fax listed: [phone]  Date received: 01/30/2018  Form description: Diabetic Supplies  Once forms are completed, please return to [facility] via fax.  Is patient requesting to be contacted when forms are completed: NA    Form placed: In provider's basket  Valentina Cm

## 2018-02-07 ENCOUNTER — TELEPHONE (OUTPATIENT)
Dept: FAMILY MEDICINE | Facility: CLINIC | Age: 58
End: 2018-02-07

## 2018-03-22 ENCOUNTER — ALLIED HEALTH/NURSE VISIT (OUTPATIENT)
Dept: EDUCATION SERVICES | Facility: CLINIC | Age: 58
End: 2018-03-22
Payer: COMMERCIAL

## 2018-03-22 VITALS — BODY MASS INDEX: 27.21 KG/M2 | WEIGHT: 137 LBS

## 2018-03-22 DIAGNOSIS — E11.9 TYPE 2 DIABETES MELLITUS WITHOUT COMPLICATION, WITHOUT LONG-TERM CURRENT USE OF INSULIN (H): Primary | ICD-10-CM

## 2018-03-22 PROCEDURE — G0108 DIAB MANAGE TRN  PER INDIV: HCPCS

## 2018-03-22 NOTE — PATIENT INSTRUCTIONS
My Diabetes Care Goals:    Healthy Eating: I will continue to eat balanced meals 3 times a day, including carbohydrate at each meal every day    Being Active: I will continue walking at least 2 times around the apartment complex each day    Monitoring: I will continue checking blood sugar once a day, varying the times I check    Follow up:  Follow-up for annual diabetes education review in 1 year or sooner, if needed.  Have A1c rechecked in early May  See Dr. Soto at the end of May/early June for diabetes follow-up.      Bring blood glucose meter and logbook with you to all doctor and follow-up appointments.     [facility]:  For Your Diabetes Education and Nutrition Appointments Call:  [phone]   For Diabetes Education or Nutrition Related Questions:   Phone: [phone]  E-mail: [email or web address]  Fax: [phone]   If you need a medication refill please contact your pharmacy. Please allow 3 business days for your refills to be completed.    Instructions for emailing the Diabetes Educators    If you need to communicate a non-urgent message to a Diabetes Educator via email, please send to [email or web address].    Please follow the following email guidelines:    Subject line: Secure: your clinic name (example: Secure: [facility])  In the email please include: First name, middle initial, last name and date of birth.    We will be in touch with you within one (1) business day.

## 2018-03-22 NOTE — PROGRESS NOTES
Diabetes Self Management Training: Follow-up Visit    Karyn Ly presents today for evaluation of glucose control related to Type 2 diabetes.    She is accompanied by self    Patient's diabetes management related comments/concerns: was in the [facility] for vacation over the last month, all February and returned March 8. Has read the diabetes education materials provided. Found that eating a small amount of rice at dinner helps blood sugars and lowers hunger    Patient would like this visit to be focused around the following diabetes-related behaviors and goals: Review of how she is doing with managing diabetes    ASSESSMENT:  Patient Problem List reviewed for relevant medical history and current medical status.    Current Diabetes Management per Patient:  Taking diabetes medications? no     Patient glucose self monitoring as follows: one time daily.     BG results: fasting glucose- 143, 130, 125, 138, 138, 140, 107, 128, 124, 124, 135, 130, 148, 122, 131, 129, 147, 145 and pre-supper glucose- 169 (after eating at a buffet for lunch)    BG values are: In goal ~50% of the time fasting  Patient's most recent   Lab Results   Component Value Date    A1C 6.4 11/10/2017    is meeting goal of <7.0    Nutrition:  Patient eats 3 meals per day    Breakfast - rice (1/2 cup) and fish OR slice of bread and egg  Lunch - small amount rice and fish and vegetables   Dinner - having some rice - 1/3 to 2/3 cups + pork + vegetables   Snacks - occasionally ice cream or biscuits (cookies)    Beverages: Water throughout the day, occasionally soda    Cultural/Denominational diet restrictions: No     Biggest Challenge to Healthy Eating: none    Physical Activity:    Type: walking - a lot while on vacation; now around apartment complex  Duration: 15-25 minutes  Frequency: everyday  Limitations: none    Diabetes Complications:  Chronic Complication Prevention: Eyes: exam within in the last year? No; scheduled with opthalmology in  "April  Nerve/Circulation: foot exam within the last year Yes  Heart Health: BP to goal Yes, LDL to goal Yes, Daily Aspirin Yes  Dental Health: brushing/flossing regularly Yes, dental exam within last year Yes  Immunizations (flu/pneumonia) up to date? Yes    Vitals:  Wt 62.1 kg (137 lb)  BMI 27.21 kg/m2  Estimated body mass index is 27.21 kg/(m^2) as calculated from the following:    Height as of 8/17/17: 1.511 m (4' 11.5\").    Weight as of this encounter: 62.1 kg (137 lb).   Last 3 BP:   BP Readings from Last 3 Encounters:   11/30/17 95/65   08/17/17 95/67   05/03/16 106/70       History   Smoking Status     Never Smoker   Smokeless Tobacco     Never Used       Labs:  Lab Results   Component Value Date    A1C 6.4 11/10/2017     Lab Results   Component Value Date     08/15/2017     Lab Results   Component Value Date    LDL 81 11/10/2017     HDL Cholesterol   Date Value Ref Range Status   11/10/2017 44 (L) >49 mg/dL Final   ]  GFR Estimate   Date Value Ref Range Status   08/15/2017 74 >60 mL/min/1.7m2 Final     Comment:     Non  GFR Calc     GFR Estimate If Black   Date Value Ref Range Status   08/15/2017 90 >60 mL/min/1.7m2 Final     Comment:      GFR Calc     Lab Results   Component Value Date    CR 0.80 08/15/2017     No results found for: MICROALBUMIN    Health Beliefs and Attitudes:   Patient Activation Measure Survey Score:  DEVAN Score (Last Two) 2/6/2014   DEVAN Raw Score 36   Activation Score 47.4   DEVAN Level 2       Stage of Change: MAINTENANCE (Working to maintain change, with risk of relapse)       Progress toward meeting diabetes-related behavioral goals:    GOALS % Met Goal   Healthy Eating  100   Physical Activity  100   Monitoring  100   Medication Taking     Problem Solving     Healthy Coping     Risk Reduction  100       Diabetes knowledge and skills assessment:     Patient is knowledgeable in diabetes management concepts related to: Healthy Eating, Being Active, " Monitoring, Problem Solving, Reducing Risks, and Healthy Coping    Patient needs further education on the following diabetes management concepts: n/a at this time    Barriers to Learning Assessment: No Barriers identified    Based on learning assessment above, most appropriate setting for further diabetes education would be: Group class or Individual setting.    INTERVENTION:    Education provided today on:  AADE Self-Care Behaviors:  Healthy Eating: consistency in amount, composition, and timing of food intake  Being Active: relationship to blood glucose and describe appropriate activity program  Monitoring: frequency of monitoring  Reducing Risks: prevention, early diagnostic measures and treatment of complications, appropriate dental care, annual eye exam, aspirin therapy, A1C - goals, relating to blood glucose levels, how often to check, lipids levels and goals and blood pressure and goals  Healthy Coping: benefits of making appropriate lifestyle changes and utilize support systems    Opportunities for ongoing education and support in diabetes-self management were discussed.    Pt verbalized understanding of concepts discussed and recommendations provided today.       Education Materials Provided:  No new materials provided today    PLAN:  See Patient Instructions for co-developed, patient-stated behavior change goals.  AVS printed and provided to patient today.    FOLLOW-UP:  Follow-up with PCP and have A1c recheck prior, as recommended in May 2018.    Ongoing plan for education and support: Written resources (magazines, books, etc.), Follow-up visit with diabetes educator in 1 year and Follow-up with primary care provider    Debra Capps, MPH, RD, LD, CDE   Time Spent: 30 minutes  Encounter Type: Individual

## 2018-03-22 NOTE — MR AVS SNAPSHOT
After Visit Summary   3/22/2018    Karyn Ly    MRN: [id number]           Patient Information     Date Of Birth          1960        Visit Information        Provider Department      3/22/2018 9:30 AM  [facility]        Care Instructions    My Diabetes Care Goals:    Healthy Eating: I will continue to eat balanced meals 3 times a day, including carbohydrate at each meal every day    Being Active: I will continue walking at least 2 times around the apartment complex each day    Monitoring: I will continue checking blood sugar once a day, varying the times I check    Follow up:  Follow-up for annual diabetes education review in 1 year or sooner, if needed.  Have A1c rechecked in early May  See Dr. Soto at the end of May/early June for diabetes follow-up.      Bring blood glucose meter and logbook with you to all doctor and follow-up appointments.     [facility] for the [facility]:  For Your Diabetes Education and Nutrition Appointments Call:  [phone]   For Diabetes Education or Nutrition Related Questions:   Phone: [phone]  E-mail: [email or web address]  Fax: [phone]   If you need a medication refill please contact your pharmacy. Please allow 3 business days for your refills to be completed.    Instructions for emailing the Diabetes Educators    If you need to communicate a non-urgent message to a Diabetes Educator via email, please send to [email or web address].    Please follow the following email guidelines:    Subject line: Secure: your clinic name (example: Secure: [place])  In the email please include: First name, middle initial, last name and date of birth.    We will be in touch with you within one (1) business day.             Follow-ups after your visit        Your next 10 appointments already scheduled     Apr 19, 2018  1:30 PM CDT   New Visit with [place] GAY  MD Carmine   [facility]    [address]  [place] [address] [phone]              Who to contact     If you have questions or need follow up information about today's clinic visit or your schedule please contact [facility] directly at [phone].  Normal or non-critical lab and imaging results will be communicated to you by MyChart, letter or phone within 4 business days after the clinic has received the results. If you do not hear from us within 7 days, please contact the clinic through MyChart or phone. If you have a critical or abnormal lab result, we will notify you by phone as soon as possible.  Submit refill requests through [employer] or call your pharmacy and they will forward the refill request to us. Please allow 3 business days for your refill to be completed.          Additional Information About Your Visit        [employer] Information     [employer] gives you secure access to your electronic health record. If you see a primary care provider, you can also send messages to your care team and make appointments. If you have questions, please call your primary care clinic.  If you do not have a primary care provider, please call [phone] and they will assist you.        Care EveryWhere ID     This is your Care EveryWhere ID. This could be used by other organizations to access your [place] medical records  [id number]        Your Vitals Were     BMI (Body Mass Index)                   27.21 kg/m2            Blood Pressure from Last 3 Encounters:   11/30/17 95/65   08/17/17 95/67   05/03/16 106/70    Weight from Last 3 Encounters:   03/22/18 62.1 kg (137 lb)   11/30/17 62.1 kg (137 lb)   10/26/17 61.5 kg (135 lb 8 oz)              Today, you had the following     No orders found for display       Primary Care Provider Office Phone # Fax #    Talia Soto [phone] [address]  [facility] [address]        Equal Access to Services     [facility] SOCRATES : Hadii aad ku hadnachoavni Kristynali, waaxda luqadaha, qaybta kaalmada pawan, corey valero. So [facility] [phone].    ATENCIÓN: Si habla español, tiene a tony disposición servicios gratuitos de asistencia lingüística. Gloria al [phone].    We comply with applicable federal civil rights laws and [place] laws. We do not discriminate on the basis of race, color, national origin, age, disability, sex, sexual orientation, or gender identity.            Thank you!     Thank you for choosing [facility]  for your care. Our goal is always to provide you with excellent care. Hearing back from our patients is one way we can continue to improve our services. Please take a few minutes to complete the written survey that you may receive in the mail after your visit with us. Thank you!             Your Updated Medication List - Protect others around you: Learn how to safely use, store and throw away your medicines at www.disposemymeds.org.          This list is accurate as of 3/22/18  9:59 AM.  Always use your most recent med list.                   Brand Name Dispense Instructions for use Diagnosis    albuterol 108 (90 BASE) MCG/ACT Inhaler    PROAIR HFA/PROVENTIL HFA/VENTOLIN HFA    3 Inhaler    Inhale 2 puffs into the lungs every 6 hours as needed for shortness of breath / dyspnea or wheezing    URI (upper respiratory infection), Mild intermittent asthma, Fever       * alcohol swab prep pads     100 each    Use to swab area of injection/mary as directed.    Type 2 diabetes mellitus without complication, without long-term current use of insulin (H)       * [employer]S ALCOHOL PREP PADS 70 % Pads     100 each    USE TO SWAB AREA OF INJECTION/MARY AS DIRECTED.    Type 2 diabetes mellitus without complication, without long-term current use of insulin (H)       aspirin 81 MG EC tablet     90 tablet    Take 1 tablet (81  mg) by mouth daily    Type 2 diabetes mellitus without complication, without long-term current use of insulin (H)       atorvastatin 20 MG tablet    LIPITOR    90 tablet    Take 1 tablet (20 mg) by mouth daily    Hyperlipidemia LDL goal <100, Type 2 diabetes mellitus without complication, without long-term current use of insulin (H)       blood glucose calibration solution    NO BRAND SPECIFIED    1 Bottle    To accompany: Blood Glucose Monitor Brands: per insurance.    Type 2 diabetes mellitus without complication, without long-term current use of insulin (H)       blood glucose monitoring meter device kit    no brand specified    1 kit    Use to test blood sugar 1 times daily or as directed. Preferred blood glucose meter OR supplies to accompany: Blood Glucose Monitor Brands: per insurance.    Type 2 diabetes mellitus without complication, without long-term current use of insulin (H)       blood glucose monitoring test strip    no brand specified    100 strip    Use to test blood sugar 1 times daily or as directed. To accompany: Blood Glucose Monitor Brands: per insurance.    Type 2 diabetes mellitus without complication, without long-term current use of insulin (H)       fluticasone 50 MCG/ACT spray    FLONASE    16 g    Spray 1-2 sprays into both nostrils daily    Post-nasal drip       lisinopril 2.5 MG tablet    PRINIVIL/Zestril    90 tablet    Take 1 tablet (2.5 mg) by mouth daily    Type 2 diabetes mellitus without complication, without long-term current use of insulin (H)       losartan 25 MG tablet    COZAAR    45 tablet    Take 0.5 tablets (12.5 mg) by mouth daily    Type 2 diabetes mellitus without complication, without long-term current use of insulin (H)       thin lancets    NO BRAND SPECIFIED    100 each    Use with lanceting device. To accompany: Blood Glucose Monitor Brands: per insurance.    Type 2 diabetes mellitus without complication, without long-term current use of insulin (H)        triamcinolone 0.1 % cream    KENALOG    15 g    Apply sparingly to affected area three times daily for 14 days.    Eczema, unspecified eczema       * Notice:  This list has 2 medication(s) that are the same as other medications prescribed for you. Read the directions carefully, and ask your doctor or other care provider to review them with you.

## 2018-04-19 ENCOUNTER — APPOINTMENT (OUTPATIENT)
Dept: OPTOMETRY | Facility: CLINIC | Age: 58
End: 2018-04-19
Payer: COMMERCIAL

## 2018-04-19 ENCOUNTER — OFFICE VISIT (OUTPATIENT)
Dept: OPHTHALMOLOGY | Facility: CLINIC | Age: 58
End: 2018-04-19
Payer: COMMERCIAL

## 2018-04-19 DIAGNOSIS — H52.4 PRESBYOPIA: ICD-10-CM

## 2018-04-19 DIAGNOSIS — E11.9 TYPE 2 DIABETES MELLITUS WITHOUT COMPLICATION, WITHOUT LONG-TERM CURRENT USE OF INSULIN (H): ICD-10-CM

## 2018-04-19 DIAGNOSIS — H35.373 EPIRETINAL MEMBRANE (ERM) OF BOTH EYES: ICD-10-CM

## 2018-04-19 DIAGNOSIS — Z01.01 ENCOUNTER FOR EXAMINATION OF EYES AND VISION WITH ABNORMAL FINDINGS: Primary | ICD-10-CM

## 2018-04-19 PROCEDURE — 92341 FIT SPECTACLES BIFOCAL: CPT | Performed by: OPHTHALMOLOGY

## 2018-04-19 PROCEDURE — 92134 CPTRZ OPH DX IMG PST SGM RTA: CPT | Performed by: OPHTHALMOLOGY

## 2018-04-19 PROCEDURE — 92015 DETERMINE REFRACTIVE STATE: CPT | Performed by: OPHTHALMOLOGY

## 2018-04-19 PROCEDURE — 92004 COMPRE OPH EXAM NEW PT 1/>: CPT | Performed by: OPHTHALMOLOGY

## 2018-04-19 ASSESSMENT — REFRACTION_MANIFEST
OD_CYLINDER: +0.50
OS_SPHERE: -0.75
OS_ADD: +2.50
OD_SPHERE: -0.25
OS_CYLINDER: +1.25
OD_ADD: +2.50
OD_AXIS: 005
OS_AXIS: 001

## 2018-04-19 ASSESSMENT — TONOMETRY
OS_IOP_MMHG: 20
OD_IOP_MMHG: 17
IOP_METHOD: APPLANATION

## 2018-04-19 ASSESSMENT — REFRACTION_WEARINGRX
OS_AXIS: 014
OS_CYLINDER: +0.50
OS_SPHERE: +1.75
OD_CYLINDER: +0.25
OD_AXIS: 017
SPECS_TYPE: READING
OD_SPHERE: +1.75

## 2018-04-19 ASSESSMENT — SLIT LAMP EXAM - LIDS
COMMENTS: 2+ DERMATOCHALASIS
COMMENTS: 2+ DERMATOCHALASIS

## 2018-04-19 ASSESSMENT — VISUAL ACUITY
OD_CC: 2
OS_CC: 2
OD_SC+: -1
METHOD: SNELLEN - LINEAR
OS_SC: 20/30
OS_SC+: -1
OD_SC: 20/25

## 2018-04-19 ASSESSMENT — CUP TO DISC RATIO
OD_RATIO: 0.3
OS_RATIO: 0.4

## 2018-04-19 ASSESSMENT — CONF VISUAL FIELD
OS_NORMAL: 1
OD_NORMAL: 1

## 2018-04-19 ASSESSMENT — EXTERNAL EXAM - RIGHT EYE: OD_EXAM: NORMAL

## 2018-04-19 ASSESSMENT — EXTERNAL EXAM - LEFT EYE: OS_EXAM: NORMAL

## 2018-04-19 NOTE — MR AVS SNAPSHOT
After Visit Summary   4/19/2018    Karyn Ly    MRN: [id number]           Patient Information     Date Of Birth          1960        Visit Information        Provider Department      4/19/2018 1:30 PM Montana Lou MD [facility]        Today's Diagnoses     Epiretinal membrane od>os    -  1    Encounter for examination of eyes and vision with abnormal findings        Presbyopia        Type 2 diabetes mellitus without complication, without long-term current use of insulin (H)          Care Instructions    Glasses Rx given - optional   Possible posterior vitreous detachment (sudden onset large floater and/or flashing lights) discussed.   Call in December 2018 for an appointment in April 2019 for Complete Exam.    Dr. Lou [phone]    Diabetes weakens the blood vessels all over the body, including the eyes. Damage to the blood vessels in the eyes can cause swelling or bleeding into part of the eye (called the retina). This is called diabetic retinopathy (MONIQUE-tin--pu-thee). If not treated, this disease can cause vision loss or blindness.   Symptoms may include blurred or distorted vision, but many people have no symptoms. It's important to see your eye doctor regularly to check for problems.   Early treatment and good control can help protect your vision. Here are the things you can do to help prevent vision loss:      1. Keep your blood sugar levels under tight control.      2. Bring high blood pressure under control.      3. No smoking.      4. Have yearly dilated eye exams.            Follow-ups after your visit        Your next 10 appointments already scheduled     May 18, 2018  7:30 AM CDT   LAB with CP LAB   [facility] ([facility])    [address] [place] [address] [phone]           Please do not eat 10-12 hours before your appointment if you are coming in fasting for  labs on lipids, cholesterol, or glucose (sugar). This does not apply to pregnant women. Water, hot tea and black coffee (with nothing added) are okay. Do not drink other fluids, diet soda or chew gum.            May 25, 2018 10:20 AM CDT   SHORT with Talia Soto MD   [facility] ([facility]    [address] [id number] [phone]              Who to contact     If you have questions or need follow up information about today's clinic visit or your schedule please contact [facility] directly at [phone].  Normal or non-critical lab and imaging results will be communicated to you by MyChart, letter or phone within 4 business days after the clinic has received the results. If you do not hear from us within 7 days, please contact the clinic through [employer] or phone. If you have a critical or abnormal lab result, we will notify you by phone as soon as possible.  Submit refill requests through [employer] or call your pharmacy and they will forward the refill request to us. Please allow 3 business days for your refill to be completed.          Additional Information About Your Visit        [employer] Information     [employer] gives you secure access to your electronic health record. If you see a primary care provider, you can also send messages to your care team and make appointments. If you have questions, please call your primary care clinic.  If you do not have a primary care provider, please call [phone] and they will assist you.        Care EveryWhere ID     This is your Care EveryWhere ID. This could be used by other organizations to access your [place] medical records  [id number]         Blood Pressure from Last 3 Encounters:   11/30/17 95/65   08/17/17 95/67   05/03/16 106/70    Weight from Last 3 Encounters:   03/22/18 62.1 kg (137 lb)   11/30/17 62.1 kg (137 lb)   10/26/17 61.5 kg (135 lb 8 oz)               We Performed the Following     EYE EXAM (SIMPLE-NONBILLABLE)     REFRACTIVE STATUS        Primary Care Provider Office Phone # Fax #    Talia Soto [phone]       [address] [facility] [address]        Equal Access to Services     [facility] SOCRATES : Hadii aad ku hadnachoo Solianaali, waaxda luqadaha, qaybta kaalmada adeegyada, waxbabs idiin haydinoran adecarson johnson micaela valero. So [facility] [phone].    ATENCIÓN: Si habla español, tiene a tony disposición servicios gratuitos de asistencia lingüística. Llame al [phone].    We comply with applicable federal civil rights laws and [place] laws. We do not discriminate on the basis of race, color, national origin, age, disability, sex, sexual orientation, or gender identity.            Thank you!     Thank you for choosing [facility]  for your care. Our goal is always to provide you with excellent care. Hearing back from our patients is one way we can continue to improve our services. Please take a few minutes to complete the written survey that you may receive in the mail after your visit with us. Thank you!             Your Updated Medication List - Protect others around you: Learn how to safely use, store and throw away your medicines at www.disposemymeds.org.          This list is accurate as of 4/19/18  2:33 PM.  Always use your most recent med list.                   Brand Name Dispense Instructions for use Diagnosis    albuterol 108 (90 Base) MCG/ACT Inhaler    PROAIR HFA/PROVENTIL HFA/VENTOLIN HFA    3 Inhaler    Inhale 2 puffs into the lungs every 6 hours as needed for shortness of breath / dyspnea or wheezing    URI (upper respiratory infection), Mild intermittent asthma, Fever       * alcohol swab prep pads     100 each    Use to swab area of injection/santosh as directed.    Type 2 diabetes mellitus without complication, without long-term current use of insulin (H)       * CVS ALCOHOL PREP PADS 70 % Pads     100  each    USE TO SWAB AREA OF INJECTION/MARY AS DIRECTED.    Type 2 diabetes mellitus without complication, without long-term current use of insulin (H)       aspirin 81 MG EC tablet     90 tablet    Take 1 tablet (81 mg) by mouth daily    Type 2 diabetes mellitus without complication, without long-term current use of insulin (H)       atorvastatin 20 MG tablet    LIPITOR    90 tablet    Take 1 tablet (20 mg) by mouth daily    Hyperlipidemia LDL goal <100, Type 2 diabetes mellitus without complication, without long-term current use of insulin (H)       blood glucose calibration solution    NO BRAND SPECIFIED    1 Bottle    To accompany: Blood Glucose Monitor Brands: per insurance.    Type 2 diabetes mellitus without complication, without long-term current use of insulin (H)       blood glucose monitoring meter device kit    no brand specified    1 kit    Use to test blood sugar 1 times daily or as directed. Preferred blood glucose meter OR supplies to accompany: Blood Glucose Monitor Brands: per insurance.    Type 2 diabetes mellitus without complication, without long-term current use of insulin (H)       blood glucose monitoring test strip    no brand specified    100 strip    Use to test blood sugar 1 times daily or as directed. To accompany: Blood Glucose Monitor Brands: per insurance.    Type 2 diabetes mellitus without complication, without long-term current use of insulin (H)       fluticasone 50 MCG/ACT spray    FLONASE    16 g    Spray 1-2 sprays into both nostrils daily    Post-nasal drip       lisinopril 2.5 MG tablet    PRINIVIL/Zestril    90 tablet    Take 1 tablet (2.5 mg) by mouth daily    Type 2 diabetes mellitus without complication, without long-term current use of insulin (H)       losartan 25 MG tablet    COZAAR    45 tablet    Take 0.5 tablets (12.5 mg) by mouth daily    Type 2 diabetes mellitus without complication, without long-term current use of insulin (H)       thin lancets    NO BRAND  SPECIFIED    100 each    Use with lanceting device. To accompany: Blood Glucose Monitor Brands: per insurance.    Type 2 diabetes mellitus without complication, without long-term current use of insulin (H)       triamcinolone 0.1 % cream    KENALOG    15 g    Apply sparingly to affected area three times daily for 14 days.    Eczema, unspecified eczema       * Notice:  This list has 2 medication(s) that are the same as other medications prescribed for you. Read the directions carefully, and ask your doctor or other care provider to review them with you.

## 2018-04-19 NOTE — PROGRESS NOTES
Current Eye Medications:  None.     Subjective:  Comprehensive Eye Exam.  Patient is here for a Diabetic Eye Exam.   She wore glasses in past (about 3 years ago), but they are no longer helping.  She has to hold reading material at arm's length. Distance vision is good without correction.    Last eye exam:  ~3 years ago.  No history of eye injuries or surgery.    No family history of glaucoma.      Lab Results   Component Value Date    A1C 6.4 11/10/2017    A1C 6.5 08/15/2017        Objective:  See Ophthalmology Exam.       Assessment:  Baseline eye exam in patient with diabetes.  No diabetic retinopathy.  Mild epiretinal membrane right eye with pseudohole.      ICD-10-CM    1. Encounter for examination of eyes and vision with abnormal findings Z01.01 REFRACTIVE STATUS   2. Presbyopia H52.4 REFRACTIVE STATUS   3. Type 2 diabetes mellitus without complication, without long-term current use of insulin (H) E11.9 EYE EXAM (SIMPLE-NONBILLABLE)   4. Epiretinal membrane, mild, od > os H35.373 HC COMPUTERIZED OPHTHALMIC IMAGING RETINA        Plan:  Glasses Rx given - optional   Possible posterior vitreous detachment (sudden onset large floater and/or flashing lights) discussed.   Call in December 2018 for an appointment in April 2019 for Complete Exam.    Dr. Lou [phone]

## 2018-04-19 NOTE — PATIENT INSTRUCTIONS
Glasses Rx given - optional   Possible posterior vitreous detachment (sudden onset large floater and/or flashing lights) discussed.   Call in December 2018 for an appointment in April 2019 for Complete Exam.    Dr. Lou [phone]    Diabetes weakens the blood vessels all over the body, including the eyes. Damage to the blood vessels in the eyes can cause swelling or bleeding into part of the eye (called the retina). This is called diabetic retinopathy (MONIQUE-tin--[facility]-thee). If not treated, this disease can cause vision loss or blindness.   Symptoms may include blurred or distorted vision, but many people have no symptoms. It's important to see your eye doctor regularly to check for problems.   Early treatment and good control can help protect your vision. Here are the things you can do to help prevent vision loss:      1. Keep your blood sugar levels under tight control.      2. Bring high blood pressure under control.      3. No smoking.      4. Have yearly dilated eye exams.

## 2018-04-19 NOTE — LETTER
4/19/2018         RE: Karyn Ly  [address] [facility] [address]        Dear Colleague,    Thank you for referring your patient, Karyn Ly, to the [facility]. Please see a copy of my visit note below.     Current Eye Medications:  None.     Subjective:  Comprehensive Eye Exam.  Patient is here for a Diabetic Eye Exam.   She wore glasses in past (about 3 years ago), but they are no longer helping.  She has to hold reading material at arm's length. Distance vision is good without correction.    Last eye exam:  ~3 years ago.  No history of eye injuries or surgery.    No family history of glaucoma.      Lab Results   Component Value Date    A1C 6.4 11/10/2017    A1C 6.5 08/15/2017        Objective:  See Ophthalmology Exam.       Assessment:  Baseline eye exam in patient with diabetes.  No diabetic retinopathy.  Mild epiretinal membrane right eye with pseudohole.      ICD-10-CM    1. Encounter for examination of eyes and vision with abnormal findings Z01.01 REFRACTIVE STATUS   2. Presbyopia H52.4 REFRACTIVE STATUS   3. Type 2 diabetes mellitus without complication, without long-term current use of insulin (H) E11.9 EYE EXAM (SIMPLE-NONBILLABLE)   4. Epiretinal membrane, mild, od > os H35.373 HC COMPUTERIZED OPHTHALMIC IMAGING RETINA        Plan:  Glasses Rx given - optional   Possible posterior vitreous detachment (sudden onset large floater and/or flashing lights) discussed.   Call in December 2018 for an appointment in April 2019 for Complete Exam.    Dr. Lou [phone]         Again, thank you for allowing me to participate in the care of your patient.        Sincerely,        [place] Lou MD

## 2018-05-11 ENCOUNTER — RADIANT APPOINTMENT (OUTPATIENT)
Dept: MAMMOGRAPHY | Facility: CLINIC | Age: 58
End: 2018-05-11
Payer: COMMERCIAL

## 2018-05-11 DIAGNOSIS — Z12.31 VISIT FOR SCREENING MAMMOGRAM: ICD-10-CM

## 2018-05-11 PROCEDURE — 77067 SCR MAMMO BI INCL CAD: CPT | Mod: TC

## 2018-05-22 DIAGNOSIS — E11.9 TYPE 2 DIABETES MELLITUS WITHOUT COMPLICATION, WITHOUT LONG-TERM CURRENT USE OF INSULIN (H): ICD-10-CM

## 2018-05-22 LAB
ANION GAP SERPL CALCULATED.3IONS-SCNC: 9 MMOL/L (ref 3–14)
BUN SERPL-MCNC: 14 MG/DL (ref 7–30)
CALCIUM SERPL-MCNC: 8.8 MG/DL (ref 8.5–10.1)
CHLORIDE SERPL-SCNC: 105 MMOL/L (ref 94–109)
CO2 SERPL-SCNC: 26 MMOL/L (ref 20–32)
CREAT SERPL-MCNC: 0.8 MG/DL (ref 0.52–1.04)
CREAT UR-MCNC: 293 MG/DL
GFR SERPL CREATININE-BSD FRML MDRD: 74 ML/MIN/1.7M2
GLUCOSE SERPL-MCNC: 131 MG/DL (ref 70–99)
HBA1C MFR BLD: 7.1 % (ref 0–5.6)
MICROALBUMIN UR-MCNC: 14 MG/L
MICROALBUMIN/CREAT UR: 4.81 MG/G CR (ref 0–25)
POTASSIUM SERPL-SCNC: 4.3 MMOL/L (ref 3.4–5.3)
SODIUM SERPL-SCNC: 140 MMOL/L (ref 133–144)

## 2018-05-22 PROCEDURE — 80048 BASIC METABOLIC PNL TOTAL CA: CPT | Performed by: INTERNAL MEDICINE

## 2018-05-22 PROCEDURE — 83036 HEMOGLOBIN GLYCOSYLATED A1C: CPT | Performed by: INTERNAL MEDICINE

## 2018-05-22 PROCEDURE — 82043 UR ALBUMIN QUANTITATIVE: CPT | Performed by: INTERNAL MEDICINE

## 2018-05-22 PROCEDURE — 36415 COLL VENOUS BLD VENIPUNCTURE: CPT | Performed by: INTERNAL MEDICINE

## 2018-05-25 ENCOUNTER — OFFICE VISIT (OUTPATIENT)
Dept: FAMILY MEDICINE | Facility: CLINIC | Age: 58
End: 2018-05-25
Payer: COMMERCIAL

## 2018-05-25 VITALS
DIASTOLIC BLOOD PRESSURE: 73 MMHG | HEART RATE: 70 BPM | TEMPERATURE: 97.8 F | BODY MASS INDEX: 28 KG/M2 | WEIGHT: 141 LBS | OXYGEN SATURATION: 95 % | SYSTOLIC BLOOD PRESSURE: 108 MMHG

## 2018-05-25 DIAGNOSIS — E11.9 TYPE 2 DIABETES MELLITUS WITHOUT COMPLICATION, WITHOUT LONG-TERM CURRENT USE OF INSULIN (H): Primary | ICD-10-CM

## 2018-05-25 DIAGNOSIS — J45.20 MILD INTERMITTENT ASTHMA WITHOUT COMPLICATION: ICD-10-CM

## 2018-05-25 PROCEDURE — 99214 OFFICE O/P EST MOD 30 MIN: CPT | Performed by: INTERNAL MEDICINE

## 2018-05-25 RX ORDER — METFORMIN HCL 500 MG
500 TABLET, EXTENDED RELEASE 24 HR ORAL
Qty: 90 TABLET | Refills: 1 | Status: SHIPPED | OUTPATIENT
Start: 2018-05-25 | End: 2018-11-05

## 2018-05-25 NOTE — MR AVS SNAPSHOT
After Visit Summary   5/25/2018    Karyn Ly    MRN: [id number]           Patient Information     Date Of Birth          1960        Visit Information        Provider Department      5/25/2018 10:20 AM Talia Soto MD [facility]        Today's Diagnoses     Type 2 diabetes mellitus without complication, without long-term current use of insulin (H)    -  1      Care Instructions    Start metformin 500 mg daily  (watch for GI side effects)    Recheck A1C in 3 months (August '18) -- lab appt only    Return to clinic 6 months (November '18)            Follow-ups after your visit        Follow-up notes from your care team     Return in about 3 months (around 8/25/2018) for diabetes follow up.      Your next 10 appointments already scheduled     Apr 23, 2019  1:30 PM CDT   New Visit with Montana Lou MD   [facility] ([facility])    [address] [address] [phone]              Future tests that were ordered for you today     Open Future Orders        Priority Expected Expires Ordered    Hemoglobin A1c Routine  5/25/2019 5/25/2018            Who to contact     If you have questions or need follow up information about today's clinic visit or your schedule please contact [facility] directly at [phone].  Normal or non-critical lab and imaging results will be communicated to you by MyChart, letter or phone within 4 business days after the clinic has received the results. If you do not hear from us within 7 days, please contact the clinic through MyChart or phone. If you have a critical or abnormal lab result, we will notify you by phone as soon as possible.  Submit refill requests through [employer] or call your pharmacy and they will forward the refill request to us. Please allow 3 business days for your refill to be completed.          Additional Information About Your Visit         [employer]obound Information     [employer] gives you secure access to your electronic health record. If you see a primary care provider, you can also send messages to your care team and make appointments. If you have questions, please call your primary care clinic.  If you do not have a primary care provider, please call [phone] and they will assist you.        Care EveryWhere ID     This is your Care EveryWhere ID. This could be used by other organizations to access your [facility] medical records  [id number]        Your Vitals Were     Pulse Temperature Pulse Oximetry BMI (Body Mass Index)          70 97.8  F (36.6  C) (Oral) 95% 28 kg/m2         Blood Pressure from Last 3 Encounters:   05/25/18 108/73   11/30/17 95/65   08/17/17 95/67    Weight from Last 3 Encounters:   05/25/18 141 lb (64 kg)   03/22/18 137 lb (62.1 kg)   11/30/17 137 lb (62.1 kg)                 Today's Medication Changes          These changes are accurate as of 5/25/18 10:49 AM.  If you have any questions, ask your nurse or doctor.               Start taking these medicines.        Dose/Directions    metFORMIN 500 MG 24 hr tablet   Commonly known as:  GLUCOPHAGE-XR   Used for:  Type 2 diabetes mellitus without complication, without long-term current use of insulin (H)   Started by:  Talia Soto MD        Dose:  500 mg   Take 1 tablet (500 mg) by mouth daily (with dinner)   Quantity:  90 tablet   Refills:  1            Where to get your medicines      These medications were sent to [facility]/pharmacy #[id number] - [place] [id number] CORNER OF [address] [address]     Phone:  [phone]     metFORMIN 500 MG 24 hr tablet                Primary Care Provider Office Phone # Fax #    Talia Soto [phone] [address] [facility] [address]        Equal Access to Services     LAVELL CROWELL AH: Maxine Mcmanus, trevor batista, enedina villalta,  corey durancarson kaye'aan ah. So [facility] [phone].    ATENCIÓN: Si habla yoav, tiene a tony disposición servicios gratuitos de asistencia lingüística. Gloria moore [phone].    We comply with applicable federal civil rights laws and [place] laws. We do not discriminate on the basis of race, color, national origin, age, disability, sex, sexual orientation, or gender identity.            Thank you!     Thank you for choosing [facility]  for your care. Our goal is always to provide you with excellent care. Hearing back from our patients is one way we can continue to improve our services. Please take a few minutes to complete the written survey that you may receive in the mail after your visit with us. Thank you!             Your Updated Medication List - Protect others around you: Learn how to safely use, store and throw away your medicines at www.disposemymeds.org.          This list is accurate as of 5/25/18 10:49 AM.  Always use your most recent med list.                   Brand Name Dispense Instructions for use Diagnosis    albuterol 108 (90 Base) MCG/ACT Inhaler    PROAIR HFA/PROVENTIL HFA/VENTOLIN HFA    3 Inhaler    Inhale 2 puffs into the lungs every 6 hours as needed for shortness of breath / dyspnea or wheezing    URI (upper respiratory infection), Mild intermittent asthma, Fever       aspirin 81 MG EC tablet     90 tablet    Take 1 tablet (81 mg) by mouth daily    Type 2 diabetes mellitus without complication, without long-term current use of insulin (H)       atorvastatin 20 MG tablet    LIPITOR    90 tablet    Take 1 tablet (20 mg) by mouth daily    Hyperlipidemia LDL goal <100, Type 2 diabetes mellitus without complication, without long-term current use of insulin (H)       blood glucose calibration solution    NO BRAND SPECIFIED    1 Bottle    To accompany: Blood Glucose Monitor Brands: per insurance.    Type 2 diabetes mellitus without complication, without  long-term current use of insulin (H)       blood glucose monitoring meter device kit    no brand specified    1 kit    Use to test blood sugar 1 times daily or as directed. Preferred blood glucose meter OR supplies to accompany: Blood Glucose Monitor Brands: per insurance.    Type 2 diabetes mellitus without complication, without long-term current use of insulin (H)       CVS ALCOHOL PREP PADS 70 % Pads     100 each    USE TO SWAB AREA OF INJECTION/MARY AS DIRECTED.    Type 2 diabetes mellitus without complication, without long-term current use of insulin (H)       fluticasone 50 MCG/ACT spray    FLONASE    16 g    Spray 1-2 sprays into both nostrils daily    Post-nasal drip       losartan 25 MG tablet    COZAAR    45 tablet    Take 0.5 tablets (12.5 mg) by mouth daily    Type 2 diabetes mellitus without complication, without long-term current use of insulin (H)       metFORMIN 500 MG 24 hr tablet    GLUCOPHAGE-XR    90 tablet    Take 1 tablet (500 mg) by mouth daily (with dinner)    Type 2 diabetes mellitus without complication, without long-term current use of insulin (H)

## 2018-05-25 NOTE — PROGRESS NOTES
SUBJECTIVE:   Karyn Ly is a 57 year old female who presents to clinic today for the following health issues:    55 y/o F here for DM recheck (7.1 A1C).  DM diagnosed this past summer   On statin and tolerating well.  Recent A1C is now at 7.1     She is disappointed with her A1C, as it was 6.5 last year and she feels she has been exercising.     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 Reports not hardship with this, no anx/depression.  Has a lot of support from sisters who live in area.      Diabetes Follow-up    Patient is checking blood sugars: once daily.  Results are as follows:         am - 150    Diabetic concerns: other - A1C went up      Symptoms of hypoglycemia (low blood sugar): hunger     Paresthesias (numbness or burning in feet) or sores: No     Date of last diabetic eye exam: 4/16/18    BP Readings from Last 2 Encounters:   05/25/18 108/73   11/30/17 95/65     Hemoglobin A1C (%)   Date Value   05/22/2018 7.1 (H)   11/10/2017 6.4 (H)     LDL Cholesterol Calculated (mg/dL)   Date Value   11/10/2017 81   08/15/2017 189 (H)       Amount of exercise or physical activity: 6-7 days/week for an average of walks     Problems taking medications regularly: No    Medication side effects: none    Diet: regular (no restrictions)             Problem list and histories reviewed & adjusted, as indicated.  Additional history: as documented    Patient Active Problem List   Diagnosis     Hyperlipidemia LDL goal <100     Mild intermittent asthma     Type 2 diabetes mellitus without complication, without long-term current use of insulin (H)     Epiretinal membrane, mild, od > os     Past Surgical History:   Procedure Laterality Date     COLONOSCOPY  2/17/2014    Procedure: COLONOSCOPY;  colon-screening / vandana;  Surgeon: Donovan Dinero MD;  Location:  OR       Social History   Substance Use Topics     Smoking status: Never Smoker     Smokeless tobacco: Never Used     Alcohol use 0.0 oz/week     0  Standard drinks or equivalent per week      Comment: occasional      Family History   Problem Relation Age of Onset     Hypertension Mother      DIABETES Father      Glaucoma Father      Lipids Brother      Macular Degeneration No family hx of          Current Outpatient Prescriptions   Medication Sig Dispense Refill     albuterol (PROAIR HFA, PROVENTIL HFA, VENTOLIN HFA) 108 (90 BASE) MCG/ACT inhaler Inhale 2 puffs into the lungs every 6 hours as needed for shortness of breath / dyspnea or wheezing 3 Inhaler 1     Alcohol Swabs (CVS ALCOHOL PREP PADS) 70 % PADS USE TO SWAB AREA OF INJECTION/MARY AS DIRECTED. 100 each 2     aspirin 81 MG EC tablet Take 1 tablet (81 mg) by mouth daily 90 tablet 3     atorvastatin (LIPITOR) 20 MG tablet Take 1 tablet (20 mg) by mouth daily 90 tablet 3     blood glucose calibration (NO BRAND SPECIFIED) solution To accompany: Blood Glucose Monitor Brands: per insurance. 1 Bottle 3     blood glucose monitoring (NO BRAND SPECIFIED) meter device kit Use to test blood sugar 1 times daily or as directed. Preferred blood glucose meter OR supplies to accompany: Blood Glucose Monitor Brands: per insurance. 1 kit 0     fluticasone (FLONASE) 50 MCG/ACT nasal spray Spray 1-2 sprays into both nostrils daily 16 g 6     losartan (COZAAR) 25 MG tablet Take 0.5 tablets (12.5 mg) by mouth daily 45 tablet 3     Allergies   Allergen Reactions     Ace Inhibitors Cough     Shellfish-Derived Products      Recent Labs   Lab Test  05/22/18   0658  11/10/17   0729  08/15/17   0654  08/12/14   0737  02/06/14   0842   A1C  7.1*  6.4*  6.5*   --    --    LDL   --   81  189*   --   177*   HDL   --   44*  40*   --   42*   TRIG   --   128  193*   --   135   ALT   --   27   --    --    --    CR  0.80   --   0.80   --   0.73   GFRESTIMATED  74   --   74   --   84   GFRESTBLACK  89   --   90   --   >90   POTASSIUM  4.3   --   3.9   --   4.1   TSH   --    --   1.34  1.21   --       BP Readings from Last 3 Encounters:    05/25/18 108/73   11/30/17 95/65   08/17/17 95/67    Wt Readings from Last 3 Encounters:   05/25/18 141 lb (64 kg)   03/22/18 137 lb (62.1 kg)   11/30/17 137 lb (62.1 kg)                  Labs reviewed in EPIC    Reviewed and updated as needed this visit by clinical staff  Tobacco  Allergies  Meds  Med Hx  Surg Hx  Fam Hx  Soc Hx      Reviewed and updated as needed this visit by Provider         ROS:  CONSTITUTIONAL: NEGATIVE for fever, chills, change in weight  ENT/MOUTH: NEGATIVE for ear, mouth and throat problems  RESP: NEGATIVE for significant cough or SOB  CV: NEGATIVE for chest pain, palpitations or peripheral edema    OBJECTIVE:     /73 (BP Location: Right arm, Patient Position: Sitting, Cuff Size: Adult Regular)  Pulse 70  Temp 97.8  F (36.6  C) (Oral)  Wt 141 lb (64 kg)  SpO2 95%  BMI 28 kg/m2  Body mass index is 28 kg/(m^2).  GENERAL: healthy, alert and no distress  EYES: Eyes grossly normal to inspection, PERRL and conjunctivae and sclerae normal  MS: no gross musculoskeletal defects noted, no edema  NEURO: gross observation: Normal strength and tone, mentation intact and speech normal  PSYCH: mentation appears normal, affect normal/bright    Diagnostic Test Results:  Results for orders placed or performed in visit on 05/22/18   Basic metabolic panel   Result Value Ref Range    Sodium 140 133 - 144 mmol/L    Potassium 4.3 3.4 - 5.3 mmol/L    Chloride 105 94 - 109 mmol/L    Carbon Dioxide 26 20 - 32 mmol/L    Anion Gap 9 3 - 14 mmol/L    Glucose 131 (H) 70 - 99 mg/dL    Urea Nitrogen 14 7 - 30 mg/dL    Creatinine 0.80 0.52 - 1.04 mg/dL    GFR Estimate 74 >60 mL/min/1.7m2    GFR Estimate If Black 89 >60 mL/min/1.7m2    Calcium 8.8 8.5 - 10.1 mg/dL   Hemoglobin A1c   Result Value Ref Range    Hemoglobin A1C 7.1 (H) 0 - 5.6 %   Albumin Random Urine Quantitative with Creat Ratio   Result Value Ref Range    Creatinine Urine 293 mg/dL    Albumin Urine mg/L 14 mg/L    Albumin Urine mg/g Cr 4.81  0 - 25 mg/g Cr       ASSESSMENT/PLAN:             ICD-10-CM    1. Type 2 diabetes mellitus without complication, without long-term current use of insulin (H) E11.9 Hemoglobin A1c     metFORMIN (GLUCOPHAGE-XR) 500 MG 24 hr tablet   2. Mild intermittent asthma without complication J45.20        Due to escalating A1C she would like trial metformin.  Side effects discussed and questions answered.      she reports absolutely not problems with asthma.      Talia Soto MD  [facility]

## 2018-05-25 NOTE — PATIENT INSTRUCTIONS
Start metformin 500 mg daily  (watch for GI side effects)    Recheck A1C in 3 months (August '18) -- lab appt only    Return to clinic 6 months (November '18)

## 2018-05-26 ASSESSMENT — ASTHMA QUESTIONNAIRES: ACT_TOTALSCORE: 25

## 2018-07-24 DIAGNOSIS — E11.9 TYPE 2 DIABETES MELLITUS WITHOUT COMPLICATION, WITHOUT LONG-TERM CURRENT USE OF INSULIN (H): ICD-10-CM

## 2018-07-24 NOTE — TELEPHONE ENCOUNTER
Requested Prescriptions   Pending Prescriptions Disp Refills     Alcohol Swabs (CVS PREP) 70 % PADS [Pharmacy Med Name: CVS ALCOHOL 70% PREP PADS]  3     Sig: USE TO SWAB AREA OF INJECTION/MARY AS DIRECTED.    There is no refill protocol information for this order         Last Written Prescription Date:  11/17/17  Last Fill Quantity: 100,   # refills: 2  Last Office Visit: 5/25/18  Future Office visit:       Routing refill request to provider for review/approval because:  Drug not on the [facility], P or [facility] refill protocol or controlled substance

## 2018-07-25 RX ORDER — LORATADINE 10 MG
TABLET ORAL
Qty: 100 EACH | Refills: 3 | Status: SHIPPED | OUTPATIENT
Start: 2018-07-25 | End: 2018-08-02

## 2018-07-30 ENCOUNTER — HOSPITAL ENCOUNTER (EMERGENCY)
Facility: CLINIC | Age: 58
Discharge: HOME | End: 2018-07-31
Attending: EMERGENCY MEDICINE | Admitting: EMERGENCY MEDICINE
Payer: COMMERCIAL

## 2018-07-30 DIAGNOSIS — T78.40XA ALLERGIC REACTION, INITIAL ENCOUNTER: ICD-10-CM

## 2018-07-30 PROCEDURE — 99284 EMERGENCY DEPT VISIT MOD MDM: CPT | Mod: 25 | Performed by: EMERGENCY MEDICINE

## 2018-07-30 PROCEDURE — 96365 THER/PROPH/DIAG IV INF INIT: CPT | Performed by: EMERGENCY MEDICINE

## 2018-07-30 PROCEDURE — 25000128 H RX IP 250 OP 636: Performed by: EMERGENCY MEDICINE

## 2018-07-30 PROCEDURE — 99284 EMERGENCY DEPT VISIT MOD MDM: CPT | Mod: Z6 | Performed by: EMERGENCY MEDICINE

## 2018-07-30 PROCEDURE — 25000125 ZZHC RX 250: Performed by: EMERGENCY MEDICINE

## 2018-07-30 PROCEDURE — 96375 TX/PRO/DX INJ NEW DRUG ADDON: CPT | Performed by: EMERGENCY MEDICINE

## 2018-07-30 RX ORDER — HEPARIN SODIUM (PORCINE) LOCK FLUSH IV SOLN 100 UNIT/ML 100 UNIT/ML
5 SOLUTION INTRAVENOUS ONCE
Status: DISCONTINUED | OUTPATIENT
Start: 2018-07-30 | End: 2018-07-30

## 2018-07-30 RX ORDER — EPINEPHRINE 0.3 MG/.3ML
0.3 INJECTION SUBCUTANEOUS
Qty: 0.6 ML | Refills: 0 | Status: SHIPPED | OUTPATIENT
Start: 2018-07-30 | End: 2021-01-29

## 2018-07-30 RX ORDER — PREDNISONE 20 MG/1
TABLET ORAL
Qty: 10 TABLET | Refills: 0 | Status: SHIPPED | OUTPATIENT
Start: 2018-07-30 | End: 2018-12-03

## 2018-07-30 RX ORDER — METHYLPREDNISOLONE SODIUM SUCCINATE 125 MG/2ML
125 INJECTION, POWDER, LYOPHILIZED, FOR SOLUTION INTRAMUSCULAR; INTRAVENOUS ONCE
Status: COMPLETED | OUTPATIENT
Start: 2018-07-30 | End: 2018-07-30

## 2018-07-30 RX ADMIN — METHYLPREDNISOLONE SODIUM SUCCINATE 125 MG: 125 INJECTION, POWDER, LYOPHILIZED, FOR SOLUTION INTRAMUSCULAR; INTRAVENOUS at 21:32

## 2018-07-30 RX ADMIN — FAMOTIDINE 20 MG: 20 INJECTION, SOLUTION INTRAVENOUS at 21:44

## 2018-07-30 NOTE — ED AVS SNAPSHOT
[facility] [place], Emergency Department    [address] [place] [address]    Phone:  [phone]                                       Karyn Ly   MRN: [id number]    Department:  [facility], Emergency Department   Date of Visit:  7/30/2018           After Visit Summary Signature Page     I have received my discharge instructions, and my questions have been answered. I have discussed any challenges I see with this plan with the nurse or doctor.    ..........................................................................................................................................  Patient/Patient Representative Signature      ..........................................................................................................................................  Patient Representative Print Name and Relationship to Patient    ..................................................               ................................................  Date                                            Time    ..........................................................................................................................................  Reviewed by Signature/Title    ...................................................              ..............................................  Date                                                            Time

## 2018-07-30 NOTE — ED AVS SNAPSHOT
[facility], Emergency Department    [address] [facility] [address]    Phone:  [phone]                                       Karyn Ly   MRN: [id number]    Department:  [facility], Emergency Department   Date of Visit:  7/30/2018           Patient Information     Date Of Birth          1960        Your diagnoses for this visit were:     Allergic reaction, initial encounter        You were seen by Joseph Gupta MD.        Discharge Instructions       Please make an appointment to follow up with Your Primary Care Provider in 3-5 days even if entirely better.  Please return to the emergency department if your symptoms return, if you have throat closing, dizziness, trouble swallowing, trouble breathing.      Discharge References/Attachments     EPINEPHRINE INJECTION (AUTO-INJECTOR) (ENGLISH)    ALLERGIC REACTIONS, GENERAL (ENGLISH)      Your next 10 appointments already scheduled     Apr 23, 2019  1:30 PM CDT   New Visit with Montana Lou MD   [facility] ([address] [address] [phone]              24 Hour Appointment Hotline       To make an appointment at any [facility], call [phone] ([phone]). If you don't have a family doctor or clinic, we will help you find one. [facility] are conveniently located to serve the needs of you and your family.             Review of your medicines      START taking        Dose / Directions Last dose taken    EPINEPHrine 0.3 MG/0.3ML injection 2-pack   Commonly known as:  EPIPEN/ADRENACLICK/or ANY BX GENERIC EQUIV   Dose:  0.3 mg   Quantity:  0.6 mL        Inject 0.3 mLs (0.3 mg) into the muscle once as needed for anaphylaxis   Refills:  0        predniSONE 20 MG tablet   Commonly known as:  DELTASONE   Quantity:  10 tablet        Take two tablets (= 40mg) each day for 5 (five) days   Refills:  0          Our records show that you are taking  the medicines listed below. If these are incorrect, please call your family doctor or clinic.        Dose / Directions Last dose taken    albuterol 108 (90 Base) MCG/ACT Inhaler   Commonly known as:  PROAIR HFA/PROVENTIL HFA/VENTOLIN HFA   Dose:  2 puff   Quantity:  3 Inhaler        Inhale 2 puffs into the lungs every 6 hours as needed for shortness of breath / dyspnea or wheezing   Refills:  1        aspirin 81 MG EC tablet   Dose:  81 mg   Quantity:  90 tablet        Take 1 tablet (81 mg) by mouth daily   Refills:  3        atorvastatin 20 MG tablet   Commonly known as:  LIPITOR   Dose:  20 mg   Quantity:  90 tablet        Take 1 tablet (20 mg) by mouth daily   Refills:  3        blood glucose calibration solution   Commonly known as:  NO BRAND SPECIFIED   Quantity:  1 Bottle        To accompany: Blood Glucose Monitor Brands: per insurance.   Refills:  3        blood glucose monitoring meter device kit   Commonly known as:  no brand specified   Quantity:  1 kit        Use to test blood sugar 1 times daily or as directed. Preferred blood glucose meter OR supplies to accompany: Blood Glucose Monitor Brands: per insurance.   Refills:  0        * CVS ALCOHOL PREP PADS 70 % Pads   Quantity:  100 each        USE TO SWAB AREA OF INJECTION/MARY AS DIRECTED.   Refills:  2        * CVS PREP 70 % Pads   Quantity:  100 each        USE TO SWAB AREA OF INJECTION/MARY AS DIRECTED.   Refills:  3        fluticasone 50 MCG/ACT spray   Commonly known as:  FLONASE   Dose:  1-2 spray   Quantity:  16 g        Spray 1-2 sprays into both nostrils daily   Refills:  6        losartan 25 MG tablet   Commonly known as:  COZAAR   Dose:  12.5 mg   Quantity:  45 tablet        Take 0.5 tablets (12.5 mg) by mouth daily   Refills:  3        metFORMIN 500 MG 24 hr tablet   Commonly known as:  GLUCOPHAGE-XR   Dose:  500 mg   Quantity:  90 tablet        Take 1 tablet (500 mg) by mouth daily (with dinner)   Refills:  1        * Notice:  This list  has 2 medication(s) that are the same as other medications prescribed for you. Read the directions carefully, and ask your doctor or other care provider to review them with you.            Prescriptions were sent or printed at these locations (2 Prescriptions)                   Other Prescriptions                Printed at Department/Unit printer (2 of 2)         EPINEPHrine (EPIPEN/ADRENACLICK/OR ANY BX GENERIC EQUIV) 0.3 MG/0.3ML injection 2-pack               predniSONE (DELTASONE) 20 MG tablet                Orders Needing Specimen Collection     None      Pending Results     No orders found for last 3 day(s).            Pending Culture Results     No orders found for last 3 day(s).            Pending Results Instructions     If you had any lab results that were not finalized at the time of your Discharge, you can call the ED Lab Result RN at [phone]. You will be contacted by this team for any positive Lab results or changes in treatment. The nurses are available 7 days a week from 10A to 6:30P.  You can leave a message 24 hours per day and they will return your call.        Thank you for choosing [facility]       Thank you for choosing [facility] for your care. Our goal is always to provide you with excellent care. Hearing back from our patients is one way we can continue to improve our services. Please take a few minutes to complete the written survey that you may receive in the mail after you visit with us. Thank you!        [employer] Information     [employer] gives you secure access to your electronic health record. If you see a primary care provider, you can also send messages to your care team and make appointments. If you have questions, please call your primary care clinic.  If you do not have a primary care provider, please call [phone] and they will assist you.        Care EveryWhere ID     This is your Care EveryWhere ID. This could be used by other organizations to access your [facility]  records  [id number]        Equal Access to Services     LAVELL CROWELL : Maxine Mcmanus, trevor batista, corey calvo. So [facility] [phone].    ATENCIÓN: Si habla español, tiene a tony disposición servicios gratuitos de asistencia lingüística. Llame al [phone].    We comply with applicable federal civil rights laws and [place] laws. We do not discriminate on the basis of race, color, national origin, age, disability, sex, sexual orientation, or gender identity.            After Visit Summary       This is your record. Keep this with you and show to your community pharmacist(s) and doctor(s) at your next visit.

## 2018-07-31 VITALS
SYSTOLIC BLOOD PRESSURE: 115 MMHG | RESPIRATION RATE: 16 BRPM | DIASTOLIC BLOOD PRESSURE: 67 MMHG | HEIGHT: 59 IN | BODY MASS INDEX: 28.22 KG/M2 | OXYGEN SATURATION: 93 % | WEIGHT: 140 LBS | TEMPERATURE: 98.4 F

## 2018-07-31 PROBLEM — Z91.013 SHELLFISH ALLERGY: Status: ACTIVE | Noted: 2018-07-31

## 2018-07-31 NOTE — ED NOTES
Bed: ED20  Expected date:   Expected time:   Means of arrival: Ambulance  Comments:  57 F, allergic to shrimp, epi and benadryl given by EMS

## 2018-07-31 NOTE — ED PROVIDER NOTES
"    [place] EMERGENCY DEPARTMENT ([facility])  7/30/18 ED 20  9:37 PM   History     Chief Complaint   Patient presents with     Allergic Reaction     HPI  Karyn Ly is a 57 year old female who presents via EMS with concern for allergic reaction.  She has a history of type 2 diabetes and shellfish allergy.   Patient is accompanied by sister Deyanira who states they always eat shrimp without issue. Lately she has been getting red blotches on her body after eating shrimp.      She ate some shellfish at 6pm today and around 7pm she developed oral swelling which made it difficult for her to maintain her secretions. 911 was called and she was given 2 Epipens simultaneously as well as benadryl by EMS prior to arrival. She still has some swelling to her lips, but her oral mucosal swelling has improved. No difficulty swallowing at this time, no difficulty maintaining secretions. No chest pain or shortness of breath. No abdominal pain, nausea or vomiting. She still has a few red spots at this time.  No other known food allergies. This is the first time she had a reaction like this.  She states that typically she manages shellfish exposures by taking zyrtec and otherwise no problems.  She does not have EpiPen at home.      I have reviewed the Medications, Allergies, Past Medical and Surgical History, and Social History in the Epic system.    Review of Systems    Physical Exam   Heart Rate: 99  Temp: 98.4  F (36.9  C)  Resp: 22  Height: 149.9 cm (4' 11\")  Weight: 63.5 kg (140 lb)  SpO2: 100 %      Physical Exam  Physical Exam   Constitutional: oriented to person, place, and time. appears well-developed and well-nourished.   HENT:   Head: Normocephalic and atraumatic.   Neck: Normal range of motion.   No stridor.  Handling secretions. No intraoral swelling. Uvula midline.  Pulmonary/Chest: Effort normal. No respiratory distress.   Abdominal: Abdomen soft, nontender, nondistended. No rebound tenderness.  MSK: " Long bones without deformity or evidence of trauma  Neurological: alert and oriented to person, place, and time.   Skin: Skin is warm and dry.   Psychiatric:  normal mood and affect.  behavior is normal. Thought content normal.     ED Course     ED Course     Procedures  None    Critical Care time: none    Assessments & Plan (with Medical Decision Making)   Differential includes allergic reaction, anaphylaxis, urticaria       MDM and Plan  patient presents with acute allergic reaction.  Patient Has no symptoms now except for mild headache.  Vitals are stable.  Patient has no evidence of swelling in the mouth or throat.  No stridor.  No trouble with secretions here.  Patient did get 2 epinephrine pens at the same time, likely did not need both.  Solu-Medrol and Pepcid given here.  She had received Benadryl prior to arrival.  Will observe the patient in the emergency department for 4 hours and discharge with epinephrine in addition to steroid burst.  She was counseled not to eat shellfish.    Re eval: Patient is resting comfortably at this point.  No swelling of the mouth, pharynx, or any symptoms concerning for airway occlusion.  Patient was given a steroid burst, epinephrine pen prescribed for home and instructions sent.  Patient will follow-up with primary care provider and return if symptoms are worsening.  Discussed avoiding eating shellfish at this point      I have reviewed the nursing notes.    I have reviewed the findings, diagnosis, plan and need for follow up with the patient.    New Prescriptions    No medications on file       Final diagnoses:   Allergic reaction, initial encounter     ISarah, am serving as a trained medical scribe to document services personally performed by Joseph Gupta MD based on the provider's statements to me on July 30, 2018.  This document has been checked and approved by the attending provider.    IJoseph MD, was physically present and have reviewed  and verified the accuracy of this note documented by Sarah Pineda, medical scribe.     7/30/2018   [facility], [place], EMERGENCY DEPARTMENT     Joseph Gupta MD  07/31/18 0111

## 2018-07-31 NOTE — ED TRIAGE NOTES
Arrives per EMS. Eating shellfish this evening and had  Anaphylactic. Pt had hives and diff brething. Received 2 pre-hospital epi and and one dose of benadryl.

## 2018-07-31 NOTE — ED TRIAGE NOTES
Arrives per EMS. Pt eating shellfish this evening with shellfish allergy previously treated with zyrtec. Developed anaphylaxis. Received 2 epi and benadryl from ems with improvement.

## 2018-07-31 NOTE — DISCHARGE INSTRUCTIONS
Please make an appointment to follow up with Your Primary Care Provider in 3-5 days even if entirely better.  Please return to the emergency department if your symptoms return, if you have throat closing, dizziness, trouble swallowing, trouble breathing.

## 2018-08-02 ENCOUNTER — OFFICE VISIT (OUTPATIENT)
Dept: FAMILY MEDICINE | Facility: CLINIC | Age: 58
End: 2018-08-02
Payer: COMMERCIAL

## 2018-08-02 VITALS
WEIGHT: 138 LBS | OXYGEN SATURATION: 98 % | SYSTOLIC BLOOD PRESSURE: 110 MMHG | HEART RATE: 60 BPM | DIASTOLIC BLOOD PRESSURE: 71 MMHG | TEMPERATURE: 98.1 F | BODY MASS INDEX: 27.87 KG/M2

## 2018-08-02 DIAGNOSIS — T78.40XA ALLERGIC REACTION, INITIAL ENCOUNTER: ICD-10-CM

## 2018-08-02 DIAGNOSIS — Z91.013 SHELLFISH ALLERGY: Primary | ICD-10-CM

## 2018-08-02 PROCEDURE — 99213 OFFICE O/P EST LOW 20 MIN: CPT | Performed by: NURSE PRACTITIONER

## 2018-08-02 ASSESSMENT — PAIN SCALES - GENERAL: PAINLEVEL: NO PAIN (0)

## 2018-08-02 NOTE — PROGRESS NOTES
SUBJECTIVE:   Karyn Ly is a 57 year old female who presents to clinic today for the following health issues:      ED/UC Followup:    Facility:  [facility]  Date of visit: 7/30/18  Reason for visit: Allergic reaction   Current Status:      She has a history of shellfish allergy but had previously tolerated shrimp. On 7/30/18, she ate shrimp and developed oral swelling. She was given 2 injections of Epi-pen simultaneously, EMS called and evaluated in ED. Given solu-medrol and pepcid in ED. Observed 4 hours. Discharged with steroid burst and epi-pen    Normally would take 1 Zyrtec with eating shrimp  This time took 2 and rash did not improve and was having difficulty speaking, swallowing    She would like to see allergist      Problem list and histories reviewed & adjusted, as indicated.  Additional history: none    Patient Active Problem List   Diagnosis     Hyperlipidemia LDL goal <100     Mild intermittent asthma     Type 2 diabetes mellitus without complication, without long-term current use of insulin (H)     Epiretinal membrane, mild, od > os     Shellfish allergy     Past Surgical History:   Procedure Laterality Date     COLONOSCOPY  2/17/2014    Procedure: COLONOSCOPY;  colon-screening / vandana;  Surgeon: Donovan Dinero MD;  Location:  OR       Social History   Substance Use Topics     Smoking status: Never Smoker     Smokeless tobacco: Never Used     Alcohol use 0.0 oz/week     0 Standard drinks or equivalent per week      Comment: occasional      Family History   Problem Relation Age of Onset     Hypertension Mother      Diabetes Father      Glaucoma Father      Lipids Brother      Macular Degeneration No family hx of          Current Outpatient Prescriptions   Medication Sig Dispense Refill     Alcohol Swabs (CVS ALCOHOL PREP PADS) 70 % PADS USE TO SWAB AREA OF INJECTION/MARY AS DIRECTED. 100 each 2     aspirin 81 MG EC tablet Take 1 tablet (81 mg) by mouth daily 90 tablet 3      atorvastatin (LIPITOR) 20 MG tablet Take 1 tablet (20 mg) by mouth daily 90 tablet 3     blood glucose calibration (NO BRAND SPECIFIED) solution To accompany: Blood Glucose Monitor Brands: per insurance. 1 Bottle 3     blood glucose monitoring (NO BRAND SPECIFIED) meter device kit Use to test blood sugar 1 times daily or as directed. Preferred blood glucose meter OR supplies to accompany: Blood Glucose Monitor Brands: per insurance. 1 kit 0     losartan (COZAAR) 25 MG tablet Take 0.5 tablets (12.5 mg) by mouth daily 45 tablet 3     metFORMIN (GLUCOPHAGE-XR) 500 MG 24 hr tablet Take 1 tablet (500 mg) by mouth daily (with dinner) 90 tablet 1     predniSONE (DELTASONE) 20 MG tablet Take two tablets (= 40mg) each day for 5 (five) days 10 tablet 0     albuterol (PROAIR HFA, PROVENTIL HFA, VENTOLIN HFA) 108 (90 BASE) MCG/ACT inhaler Inhale 2 puffs into the lungs every 6 hours as needed for shortness of breath / dyspnea or wheezing (Patient not taking: Reported on 8/2/2018) 3 Inhaler 1     EPINEPHrine (EPIPEN/ADRENACLICK/OR ANY BX GENERIC EQUIV) 0.3 MG/0.3ML injection 2-pack Inject 0.3 mLs (0.3 mg) into the muscle once as needed for anaphylaxis (Patient not taking: Reported on 8/2/2018) 0.6 mL 0     fluticasone (FLONASE) 50 MCG/ACT nasal spray Spray 1-2 sprays into both nostrils daily (Patient not taking: Reported on 8/2/2018) 16 g 6       Reviewed and updated as needed this visit by clinical staff       Reviewed and updated as needed this visit by Provider         ROS:  Constitutional, HEENT, cardiovascular, pulmonary, gi and gu systems are negative, except as otherwise noted.    OBJECTIVE:     /71 (BP Location: Right arm, Patient Position: Chair, Cuff Size: Adult Regular)  Pulse 60  Temp 98.1  F (36.7  C) (Oral)  Wt 138 lb (62.6 kg)  SpO2 98%  Breastfeeding? No  BMI 27.87 kg/m2  Body mass index is 27.87 kg/(m^2).  GENERAL: healthy, alert and no distress  HENT: ear canals and TM's normal, nose and mouth  without ulcers or lesions  RESP: lungs clear to auscultation - no rales, rhonchi or wheezes  CV: regular rate and rhythm, normal S1 S2, no S3 or S4, no murmur, click or rub, no peripheral edema and peripheral pulses strong  SKIN: no suspicious lesions or rashes    Diagnostic Test Results:  none     ASSESSMENT/PLAN:       ICD-10-CM    1. Shellfish allergy Z91.013 ALLERGY/ASTHMA ADULT REFERRAL   2. Allergic reaction, initial encounter T78.40XA ALLERGY/ASTHMA ADULT REFERRAL     She is doing well since ED visit  Advised against eating any shellfish  Referral for allergy testing  Carry epi-pen with you  Patient Instructions   Call to schedule appointment with Allergist for allergy testing  You will need to be off of anti-histamines in order to do the skin testing. Please clarify when you schedule the appointment how long you need to be off of them for      SLIME [facility]

## 2018-08-02 NOTE — PATIENT INSTRUCTIONS
Call to schedule appointment with Allergist for allergy testing  You will need to be off of anti-histamines in order to do the skin testing. Please clarify when you schedule the appointment how long you need to be off of them for

## 2018-08-02 NOTE — MR AVS SNAPSHOT
After Visit Summary   8/2/2018    Karyn Ly    MRN: [id number]           Patient Information     Date Of Birth          1960        Visit Information        Provider Department      8/2/2018 8:20 AM Maya Mace APRN CNP [facility]        Today's Diagnoses     Shellfish allergy    -  1    Allergic reaction, initial encounter          Care Instructions    Call to schedule appointment with Allergist for allergy testing  You will need to be off of anti-histamines in order to do the skin testing. Please clarify when you schedule the appointment how long you need to be off of them for          Follow-ups after your visit        Additional Services     ALLERGY/ASTHMA ADULT REFERRAL       Your provider has referred you to: [facility]: [facility] [phone]  [email or web address]    Please be aware that coverage of these services is subject to the terms and limitations of your health insurance plan.  Call member services at your health plan with any benefit or coverage questions.      Please bring the following with you to your appointment:    (1) Any X-Rays, CTs or MRIs which have been performed.  Contact the facility where they were done to arrange for  prior to your scheduled appointment.    (2) List of current medications  (3) This referral request   (4) Any documents/labs given to you for this referral                  Your next 10 appointments already scheduled     Aug 02, 2018  8:20 AM CDT   [facility] with SLIME Carballo C[facility]   [facility]    [address] [place] [address] [phone]            Apr 23, 2019  1:30 PM CDT   New Visit with [facility] Lou MD   [facility]    [address]  [place] [id number] [phone]              Who to contact     If  you have questions or need follow up information about today's clinic visit or your schedule please contact [facility] directly at [phone].  Normal or non-critical lab and imaging results will be communicated to you by MyChart, letter or phone within 4 business days after the clinic has received the results. If you do not hear from us within 7 days, please contact the clinic through Yoostayhart or phone. If you have a critical or abnormal lab result, we will notify you by phone as soon as possible.  Submit refill requests through damntheradio or call your pharmacy and they will forward the refill request to us. Please allow 3 business days for your refill to be completed.          Additional Information About Your Visit        [email or web address] Information     [email or web address] gives you secure access to your electronic health record. If you see a primary care provider, you can also send messages to your care team and make appointments. If you have questions, please call your primary care clinic.  If you do not have a primary care provider, please call [phone] and they will assist you.        Care EveryWhere ID     This is your Care EveryWhere ID. This could be used by other organizations to access your [facility] medical records  [id number]        Your Vitals Were     Pulse Temperature Pulse Oximetry Breastfeeding? BMI (Body Mass Index)       60 98.1  F (36.7  C) (Oral) 98% No 27.87 kg/m2        Blood Pressure from Last 3 Encounters:   08/02/18 110/71   07/31/18 115/67   05/25/18 108/73    Weight from Last 3 Encounters:   08/02/18 138 lb (62.6 kg)   07/30/18 140 lb (63.5 kg)   05/25/18 141 lb (64 kg)              We Performed the Following     ALLERGY/ASTHMA ADULT REFERRAL        Primary Care Provider Office Phone # Fax #    Talia Soto [phone]       [address] [facility] [address]        Equal Access to Services     LAVELL CROWELL AH: Maxine Mcmanus  waednada patriciolucian, qaybta kacynthia villalta, corey palomaresaamert ah. So [facility] [phone].    ATENCIÓN: Si jorge acson, tiene a tony disposición servicios gratuitos de asistencia lingüística. Gloria al [phone].    We comply with applicable federal civil rights laws and [place] laws. We do not discriminate on the basis of race, color, national origin, age, disability, sex, sexual orientation, or gender identity.            Thank you!     Thank you for choosing [facility]  for your care. Our goal is always to provide you with excellent care. Hearing back from our patients is one way we can continue to improve our services. Please take a few minutes to complete the written survey that you may receive in the mail after your visit with us. Thank you!             Your Updated Medication List - Protect others around you: Learn how to safely use, store and throw away your medicines at www.disposemymeds.org.          This list is accurate as of 8/2/18  8:06 AM.  Always use your most recent med list.                   Brand Name Dispense Instructions for use Diagnosis    albuterol 108 (90 Base) MCG/ACT Inhaler    PROAIR HFA/PROVENTIL HFA/VENTOLIN HFA    3 Inhaler    Inhale 2 puffs into the lungs every 6 hours as needed for shortness of breath / dyspnea or wheezing    URI (upper respiratory infection), Mild intermittent asthma, Fever       aspirin 81 MG EC tablet     90 tablet    Take 1 tablet (81 mg) by mouth daily    Type 2 diabetes mellitus without complication, without long-term current use of insulin (H)       atorvastatin 20 MG tablet    LIPITOR    90 tablet    Take 1 tablet (20 mg) by mouth daily    Hyperlipidemia LDL goal <100, Type 2 diabetes mellitus without complication, without long-term current use of insulin (H)       blood glucose calibration solution    NO BRAND SPECIFIED    1 Bottle    To accompany: Blood Glucose Monitor Brands: per insurance.    Type 2 diabetes  mellitus without complication, without long-term current use of insulin (H)       blood glucose monitoring meter device kit    no brand specified    1 kit    Use to test blood sugar 1 times daily or as directed. Preferred blood glucose meter OR supplies to accompany: Blood Glucose Monitor Brands: per insurance.    Type 2 diabetes mellitus without complication, without long-term current use of insulin (H)       CVS ALCOHOL PREP PADS 70 % Pads     100 each    USE TO SWAB AREA OF INJECTION/MARY AS DIRECTED.    Type 2 diabetes mellitus without complication, without long-term current use of insulin (H)       EPINEPHrine 0.3 MG/0.3ML injection 2-pack    EPIPEN/ADRENACLICK/or ANY BX GENERIC EQUIV    0.6 mL    Inject 0.3 mLs (0.3 mg) into the muscle once as needed for anaphylaxis        fluticasone 50 MCG/ACT spray    FLONASE    16 g    Spray 1-2 sprays into both nostrils daily    Post-nasal drip       losartan 25 MG tablet    COZAAR    45 tablet    Take 0.5 tablets (12.5 mg) by mouth daily    Type 2 diabetes mellitus without complication, without long-term current use of insulin (H)       metFORMIN 500 MG 24 hr tablet    GLUCOPHAGE-XR    90 tablet    Take 1 tablet (500 mg) by mouth daily (with dinner)    Type 2 diabetes mellitus without complication, without long-term current use of insulin (H)       predniSONE 20 MG tablet    DELTASONE    10 tablet    Take two tablets (= 40mg) each day for 5 (five) days

## 2018-08-20 DIAGNOSIS — E11.9 TYPE 2 DIABETES MELLITUS WITHOUT COMPLICATION, WITHOUT LONG-TERM CURRENT USE OF INSULIN (H): ICD-10-CM

## 2018-08-20 LAB — HBA1C MFR BLD: 6.6 % (ref 0–5.6)

## 2018-08-20 PROCEDURE — 36415 COLL VENOUS BLD VENIPUNCTURE: CPT | Performed by: INTERNAL MEDICINE

## 2018-08-20 PROCEDURE — 83036 HEMOGLOBIN GLYCOSYLATED A1C: CPT | Performed by: INTERNAL MEDICINE

## 2018-08-29 DIAGNOSIS — E11.9 TYPE 2 DIABETES MELLITUS WITHOUT COMPLICATION, WITHOUT LONG-TERM CURRENT USE OF INSULIN (H): ICD-10-CM

## 2018-08-29 NOTE — TELEPHONE ENCOUNTER
"Requested Prescriptions   Pending Prescriptions Disp Refills     ONETOUCH ULTRA test strip [Pharmacy Med Name: ONE TOUCH ULTRA BLUE TEST STRP] 100 strip 5    Last Written Prescription Date:  8/17/17  Last Fill Quantity: 1,  # refills: 0   Last office visit: 8/2/2018 with prescribing provider:     Future Office Visit:     Sig: USE TO TEST 1 TIME DAILY OR AS DIRECETD    Diabetic Supplies Protocol Passed    8/29/2018  1:41 PM       Passed - Patient is 18 years of age or older       Passed - Recent (6 mo) or future (30 days) visit within the authorizing provider's specialty    Patient had office visit in the last 6 months or has a visit in the next 30 days with authorizing provider.  See \"Patient Info\" tab in inbasket, or \"Choose Columns\" in Meds & Orders section of the refill encounter.              "

## 2018-08-30 NOTE — TELEPHONE ENCOUNTER
Prescription approved per [facility] Refill Protocol.  Cherie Gonzales, RN CPC Triage.

## 2018-09-17 DIAGNOSIS — E11.9 TYPE 2 DIABETES MELLITUS WITHOUT COMPLICATION, WITHOUT LONG-TERM CURRENT USE OF INSULIN (H): ICD-10-CM

## 2018-09-17 DIAGNOSIS — E78.5 HYPERLIPIDEMIA LDL GOAL <100: ICD-10-CM

## 2018-09-17 RX ORDER — ATORVASTATIN CALCIUM 20 MG/1
TABLET, FILM COATED ORAL
Qty: 90 TABLET | Refills: 0 | Status: SHIPPED | OUTPATIENT
Start: 2018-09-17 | End: 2018-12-28

## 2018-09-17 NOTE — TELEPHONE ENCOUNTER
"Requested Prescriptions   Pending Prescriptions Disp Refills     atorvastatin (LIPITOR) 20 MG tablet [Pharmacy Med Name: ATORVASTATIN 20 MG TABLET] 90 tablet 2    Last Written Prescription Date:  8-17-17  Last Fill Quantity: 90,  # refills: 3   Last office visit: 8/2/2018 with prescribing provider:  5-25-18   Future Office Visit:     Sig: TAKE 1 TABLET (20 MG) BY MOUTH DAILY    Statins Protocol Passed    9/17/2018 10:31 AM       Passed - LDL on file in past 12 months    Recent Labs   Lab Test  11/10/17   0729   LDL  81            Passed - No abnormal creatine kinase in past 12 months    Recent Labs   Lab Test  11/10/17   0729   CKT  112               Passed - Recent (12 mo) or future (30 days) visit within the authorizing provider's specialty    Patient had office visit in the last 12 months or has a visit in the next 30 days with authorizing provider or within the authorizing provider's specialty.  See \"Patient Info\" tab in inbasket, or \"Choose Columns\" in Meds & Orders section of the refill encounter.           Passed - Patient is age 18 or older       Passed - No active pregnancy on record       Passed - No positive pregnancy test in past 12 months          "

## 2018-11-05 DIAGNOSIS — E11.9 TYPE 2 DIABETES MELLITUS WITHOUT COMPLICATION, WITHOUT LONG-TERM CURRENT USE OF INSULIN (H): ICD-10-CM

## 2018-11-05 NOTE — TELEPHONE ENCOUNTER
"Requested Prescriptions   Pending Prescriptions Disp Refills     metFORMIN (GLUCOPHAGE-XR) 500 MG 24 hr tablet [Pharmacy Med Name: METFORMIN  MG TABLET] 90 tablet 1    Last Written Prescription Date:  5-25-18  Last Fill Quantity: 90,  # refills: 1   Last office visit: 8/2/2018 with prescribing provider:  5-25-18   Future Office Visit:     Sig: TAKE 1 TABLET (500 MG) BY MOUTH DAILY (WITH DINNER)    Biguanide Agents Passed    11/5/2018 12:40 PM       Passed - Blood pressure less than 140/90 in past 6 months    BP Readings from Last 3 Encounters:   08/02/18 110/71   07/31/18 115/67   05/25/18 108/73                Passed - Patient has documented LDL within the past 12 mos.    Recent Labs   Lab Test  11/10/17   0729   LDL  81            Passed - Patient has had a Microalbumin in the past 15 mos.    Recent Labs   Lab Test  05/22/18   0700   MICROL  14   UMALCR  4.81            Passed - Patient is age 10 or older       Passed - Patient has documented A1c within the specified period of time.    If HgbA1C is 8 or greater, it needs to be on file within the past 3 months.  If less than 8, must be on file within the past 6 months.     Recent Labs   Lab Test  08/20/18   0651   A1C  6.6*            Passed - Patient's CR is NOT>1.4 OR Patient's EGFR is NOT<45 within past 12 mos.    Recent Labs   Lab Test  05/22/18   0658   GFRESTIMATED  74   GFRESTBLACK  89       Recent Labs   Lab Test  05/22/18   0658   CR  0.80            Passed - Patient does NOT have a diagnosis of CHF.       Passed - Patient is not pregnant       Passed - Patient has not had a positive pregnancy test within the past 12 mos.        Passed - Recent (6 mo) or future (30 days) visit within the authorizing provider's specialty    Patient had office visit in the last 6 months or has a visit in the next 30 days with authorizing provider or within the authorizing provider's specialty.  See \"Patient Info\" tab in inbasket, or \"Choose Columns\" in Meds & Orders " section of the refill encounter.

## 2018-11-06 RX ORDER — METFORMIN HCL 500 MG
TABLET, EXTENDED RELEASE 24 HR ORAL
Qty: 90 TABLET | Refills: 1 | Status: SHIPPED | OUTPATIENT
Start: 2018-11-06 | End: 2019-06-28

## 2018-11-06 NOTE — TELEPHONE ENCOUNTER
Prescription approved per [facility] Refill Protocol.  Isabella Day RN

## 2018-12-05 DIAGNOSIS — E11.9 TYPE 2 DIABETES MELLITUS WITHOUT COMPLICATION, WITHOUT LONG-TERM CURRENT USE OF INSULIN (H): ICD-10-CM

## 2018-12-05 NOTE — TELEPHONE ENCOUNTER
Requested Prescriptions   Pending Prescriptions Disp Refills     losartan (COZAAR) 25 MG tablet 45 tablet 3     Sig: Take 0.5 tablets (12.5 mg) by mouth daily    There is no refill protocol information for this order   Last Written Prescription Date:  12-11-17  Last Fill Quantity: 45,  # refills: 3   Last office visit: 8/2/2018 with prescribing provider:  5-25-18   Future Office Visit:   Next 5 appointments (look out 90 days)     Dec 06, 2018  6:40 AM CST   PHYSICAL with Talia Soto MD   [facility]    [address] [place] [id number] [phone]

## 2018-12-06 ENCOUNTER — OFFICE VISIT (OUTPATIENT)
Dept: FAMILY MEDICINE | Facility: CLINIC | Age: 58
End: 2018-12-06
Payer: COMMERCIAL

## 2018-12-06 VITALS
SYSTOLIC BLOOD PRESSURE: 124 MMHG | BODY MASS INDEX: 26.7 KG/M2 | OXYGEN SATURATION: 96 % | HEIGHT: 60 IN | HEART RATE: 75 BPM | DIASTOLIC BLOOD PRESSURE: 79 MMHG | WEIGHT: 136 LBS | TEMPERATURE: 97.8 F

## 2018-12-06 DIAGNOSIS — Z00.00 ROUTINE GENERAL MEDICAL EXAMINATION AT A HEALTH CARE FACILITY: Primary | ICD-10-CM

## 2018-12-06 DIAGNOSIS — E11.9 TYPE 2 DIABETES MELLITUS WITHOUT COMPLICATION, WITHOUT LONG-TERM CURRENT USE OF INSULIN (H): ICD-10-CM

## 2018-12-06 DIAGNOSIS — Z11.4 SCREENING FOR HIV (HUMAN IMMUNODEFICIENCY VIRUS): ICD-10-CM

## 2018-12-06 DIAGNOSIS — Z13.89 SCREENING FOR DIABETIC PERIPHERAL NEUROPATHY: ICD-10-CM

## 2018-12-06 DIAGNOSIS — E78.5 HYPERLIPIDEMIA LDL GOAL <100: ICD-10-CM

## 2018-12-06 LAB
CHOLEST SERPL-MCNC: 170 MG/DL
HBA1C MFR BLD: 6.6 % (ref 0–5.6)
HDLC SERPL-MCNC: 48 MG/DL
HIV 1+2 AB+HIV1 P24 AG SERPL QL IA: NONREACTIVE
LDLC SERPL CALC-MCNC: 97 MG/DL
NONHDLC SERPL-MCNC: 122 MG/DL
TRIGL SERPL-MCNC: 126 MG/DL

## 2018-12-06 PROCEDURE — 99212 OFFICE O/P EST SF 10 MIN: CPT | Mod: 25 | Performed by: INTERNAL MEDICINE

## 2018-12-06 PROCEDURE — 99396 PREV VISIT EST AGE 40-64: CPT | Performed by: INTERNAL MEDICINE

## 2018-12-06 PROCEDURE — 87389 HIV-1 AG W/HIV-1&-2 AB AG IA: CPT | Performed by: INTERNAL MEDICINE

## 2018-12-06 PROCEDURE — 36415 COLL VENOUS BLD VENIPUNCTURE: CPT | Performed by: INTERNAL MEDICINE

## 2018-12-06 PROCEDURE — 80061 LIPID PANEL: CPT | Performed by: INTERNAL MEDICINE

## 2018-12-06 PROCEDURE — 99207 C FOOT EXAM  NO CHARGE: CPT | Mod: 25 | Performed by: INTERNAL MEDICINE

## 2018-12-06 PROCEDURE — 83036 HEMOGLOBIN GLYCOSYLATED A1C: CPT | Performed by: INTERNAL MEDICINE

## 2018-12-06 RX ORDER — LOSARTAN POTASSIUM 25 MG/1
12.5 TABLET ORAL DAILY
Qty: 45 TABLET | Refills: 3 | Status: SHIPPED | OUTPATIENT
Start: 2018-12-06 | End: 2019-11-05

## 2018-12-06 ASSESSMENT — ENCOUNTER SYMPTOMS
FREQUENCY: 1
BREAST MASS: 0
MYALGIAS: 0
HEMATOCHEZIA: 0
HEMATURIA: 0
HEARTBURN: 0
SORE THROAT: 0
COUGH: 0
PALPITATIONS: 0
CHILLS: 0
DIZZINESS: 0
DIARRHEA: 0
SHORTNESS OF BREATH: 0
PARESTHESIAS: 0
NERVOUS/ANXIOUS: 0
ABDOMINAL PAIN: 0
ARTHRALGIAS: 0
FEVER: 0
WEAKNESS: 0
HEADACHES: 0
DYSURIA: 0
NAUSEA: 0
JOINT SWELLING: 0
CONSTIPATION: 0
EYE PAIN: 0

## 2018-12-06 NOTE — TELEPHONE ENCOUNTER
"Requested Prescriptions   Pending Prescriptions Disp Refills     losartan (COZAAR) 25 MG tablet 45 tablet 3     Sig: Take 0.5 tablets (12.5 mg) by mouth daily    Angiotensin-II Receptors Passed    12/5/2018  9:44 AM       Passed - Blood pressure under 140/90 in past 12 months    BP Readings from Last 3 Encounters:   12/06/18 124/79   08/02/18 110/71   07/31/18 115/67                Passed - Recent (12 mo) or future (30 days) visit within the authorizing provider's specialty    Patient had office visit in the last 12 months or has a visit in the next 30 days with authorizing provider or within the authorizing provider's specialty.  See \"Patient Info\" tab in inbasket, or \"Choose Columns\" in Meds & Orders section of the refill encounter.             Passed - Patient is age 18 or older       Passed - No active pregnancy on record       Passed - Normal serum creatinine on file in past 12 months    Recent Labs   Lab Test  05/22/18   0658   CR  0.80            Passed - Normal serum potassium on file in past 12 months    Recent Labs   Lab Test  05/22/18   0658   POTASSIUM  4.3                   Passed - No positive pregnancy test in past 12 months        Prescription approved per [facility] Refill Protocol.  Flor Sweeney RN    "

## 2018-12-06 NOTE — PATIENT INSTRUCTIONS
Consider Shingrix for 2019  -- (Shingles vaccine)      See Dr Meyer for removal of skin lesion    Return to clinic 6 months (June) with labs prior (non fasting is okay) for diabetes follow up

## 2018-12-06 NOTE — MR AVS SNAPSHOT
After Visit Summary   12/6/2018    Karyn Ly    MRN: [id number]           Patient Information     Date Of Birth          1960        Visit Information        Provider Department      12/6/2018 6:40 AM Talia Soto MD [facility]        Today's Diagnoses     Routine general medical examination at a health care facility    -  1    Type 2 diabetes mellitus without complication, without long-term current use of insulin (H)        Screening for HIV (human immunodeficiency virus)        Hyperlipidemia LDL goal <100        Screening for diabetic peripheral neuropathy          Care Instructions    Consider Shingrix for 2019  -- (Shingles vaccine)      See Dr Meyer for removal of skin lesion    Return to clinic 6 months (June) with labs prior (non fasting is okay) for diabetes follow up          Follow-ups after your visit        Follow-up notes from your care team     Return in about 6 months (around 6/6/2019) for diabetes follow up.      Your next 10 appointments already scheduled     Apr 23, 2019  1:30 PM CDT   New Visit with Montana Lou MD   [facility] ([facility])    [address] [place] [id number] [phone]              Future tests that were ordered for you today     Open Future Orders        Priority Expected Expires Ordered    Basic metabolic panel Routine  12/5/2019 12/6/2018    Hemoglobin A1c Routine  12/6/2019 12/6/2018    Albumin Random Urine Quantitative with Creat Ratio Routine  12/6/2019 12/6/2018            Who to contact     If you have questions or need follow up information about today's clinic visit or your schedule please contact [facility] directly at [phone].  Normal or non-critical lab and imaging results will be communicated to you by MyChart, letter or phone within 4 business days after the clinic has received the results. If you do not hear from us within 7  "days, please contact the clinic through [employer] or phone. If you have a critical or abnormal lab result, we will notify you by phone as soon as possible.  Submit refill requests through [employer] or call your pharmacy and they will forward the refill request to us. Please allow 3 business days for your refill to be completed.          Additional Information About Your Visit        [employer] Information     [employer] gives you secure access to your electronic health record. If you see a primary care provider, you can also send messages to your care team and make appointments. If you have questions, please call your primary care clinic.  If you do not have a primary care provider, please call [phone] and they will assist you.        Care EveryWhere ID     This is your Care EveryWhere ID. This could be used by other organizations to access your [place] medical records  [id number]        Your Vitals Were     Pulse Temperature Height Pulse Oximetry BMI (Body Mass Index)       75 97.8  F (36.6  C) (Oral) 4' 11.5\" (1.511 m) 96% 27.01 kg/m2        Blood Pressure from Last 3 Encounters:   12/06/18 124/79   08/02/18 110/71   07/31/18 115/67    Weight from Last 3 Encounters:   12/06/18 136 lb (61.7 kg)   08/02/18 138 lb (62.6 kg)   07/30/18 140 lb (63.5 kg)              We Performed the Following     Asthma Action Plan (AAP)     FOOT EXAM  NO CHARGE [25552.114]     Hemoglobin A1c     HIV Screening     Lipid panel reflex to direct LDL Fasting     OFFICE/OUTPT VISIT,GEMA MURILLO II        Primary Care Provider Office Phone # Fax #    Talia Soto [phone]       [address] [facility] [address]        Equal Access to Services     ROBERTA CROWELL : Hadii willa Mcmanus, trevor batista, qaybta kaalmada corey villalta. So [facility] [phone].    ATENCIÓN: Si habla español, tiene a tony disposición servicios gratuitos de asistencia lingüística. Llame al " [phone].    We comply with applicable federal civil rights laws and [place] laws. We do not discriminate on the basis of race, color, national origin, age, disability, sex, sexual orientation, or gender identity.            Thank you!     Thank you for choosing [facility]  for your care. Our goal is always to provide you with excellent care. Hearing back from our patients is one way we can continue to improve our services. Please take a few minutes to complete the written survey that you may receive in the mail after your visit with us. Thank you!             Your Updated Medication List - Protect others around you: Learn how to safely use, store and throw away your medicines at www.disposemymeds.org.          This list is accurate as of 12/6/18  7:25 AM.  Always use your most recent med list.                   Brand Name Dispense Instructions for use Diagnosis    albuterol 108 (90 Base) MCG/ACT inhaler    PROAIR HFA/PROVENTIL HFA/VENTOLIN HFA    3 Inhaler    Inhale 2 puffs into the lungs every 6 hours as needed for shortness of breath / dyspnea or wheezing    URI (upper respiratory infection), Mild intermittent asthma, Fever       aspirin 81 MG EC tablet    ASA    90 tablet    Take 1 tablet (81 mg) by mouth daily    Type 2 diabetes mellitus without complication, without long-term current use of insulin (H)       atorvastatin 20 MG tablet    LIPITOR    90 tablet    TAKE 1 TABLET (20 MG) BY MOUTH DAILY    Hyperlipidemia LDL goal <100, Type 2 diabetes mellitus without complication, without long-term current use of insulin (H)       blood glucose calibration solution    NO BRAND SPECIFIED    1 Bottle    To accompany: Blood Glucose Monitor Brands: per insurance.    Type 2 diabetes mellitus without complication, without long-term current use of insulin (H)       blood glucose monitoring meter device kit    NO BRAND SPECIFIED    1 kit    Use to test blood sugar 1 times daily or as directed.  Preferred blood glucose meter OR supplies to accompany: Blood Glucose Monitor Brands: per insurance.    Type 2 diabetes mellitus without complication, without long-term current use of insulin (H)       CVS ALCOHOL PREP PADS 70 % Pads     100 each    USE TO SWAB AREA OF INJECTION/MARY AS DIRECTED.    Type 2 diabetes mellitus without complication, without long-term current use of insulin (H)       EPINEPHrine 0.3 MG/0.3ML injection 2-pack    EPIPEN/ADRENACLICK/or ANY BX GENERIC EQUIV    0.6 mL    Inject 0.3 mLs (0.3 mg) into the muscle once as needed for anaphylaxis        fluticasone 50 MCG/ACT nasal spray    FLONASE    16 g    Spray 1-2 sprays into both nostrils daily    Post-nasal drip       losartan 25 MG tablet    COZAAR    45 tablet    Take 0.5 tablets (12.5 mg) by mouth daily    Type 2 diabetes mellitus without complication, without long-term current use of insulin (H)       metFORMIN 500 MG 24 hr tablet    GLUCOPHAGE-XR    90 tablet    TAKE 1 TABLET (500 MG) BY MOUTH DAILY (WITH DINNER)    Type 2 diabetes mellitus without complication, without long-term current use of insulin (H)       ONETOUCH ULTRA test strip   Generic drug:  blood glucose monitoring     100 strip    USE TO TEST 1 TIME DAILY OR AS DIRECETD    Type 2 diabetes mellitus without complication, without long-term current use of insulin (H)

## 2018-12-06 NOTE — PROGRESS NOTES
SUBJECTIVE:   CC: Karyn Ly is an 58 year old woman who presents for preventive health visit.     59 y/o F here for AFE.  H/o DMII< Milde Intermittent asthma and hyperlipidemia.   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 ...   *   Concern re: phlegm in the mornings.     Physical   Annual:     Getting at least 3 servings of Calcium per day:  NO    Bi-annual eye exam:  Yes    Dental care twice a year:  NO    Sleep apnea or symptoms of sleep apnea:  Excessive snoring    Diet:  Low fat/cholesterol and Diabetic    Frequency of exercise:  4-5 days/week    Duration of exercise:  15-30 minutes    Taking medications regularly:  Yes    Medication side effects:  None    Additional concerns today:  Yes    PHQ-2 Total Score: 0          Phlem in the morning's    Today's PHQ-2 Score:   PHQ-2 ( 1999 Pfizer) 12/6/2018   Q1: Little interest or pleasure in doing things 0   Q2: Feeling down, depressed or hopeless 0   PHQ-2 Score 0   Q1: Little interest or pleasure in doing things Not at all   Q2: Feeling down, depressed or hopeless Not at all   PHQ-2 Score 0       Abuse: Current or Past(Physical, Sexual or Emotional)- No  Do you feel safe in your environment? Yes    Social History   Substance Use Topics     Smoking status: Never Smoker     Smokeless tobacco: Never Used     Alcohol use 0.0 oz/week     0 Standard drinks or equivalent per week      Comment: occasional      Alcohol Use 12/6/2018   If you drink alcohol do you typically have greater than 3 drinks per day OR greater than 7 drinks per week? No       Reviewed orders with patient.  Reviewed health maintenance and updated orders accordingly - Yes  Labs reviewed in EPIC  BP Readings from Last 3 Encounters:   12/06/18 124/79   08/02/18 110/71   07/31/18 115/67    Wt Readings from Last 3 Encounters:   12/06/18 136 lb (61.7 kg)   08/02/18 138 lb (62.6 kg)   07/30/18 140 lb  (63.5 kg)                  Patient Active Problem List   Diagnosis     Hyperlipidemia LDL goal <100     Mild intermittent asthma     Type 2 diabetes mellitus without complication, without long-term current use of insulin (H)     Epiretinal membrane, mild, od > os     Shellfish allergy     Past Surgical History:   Procedure Laterality Date     COLONOSCOPY  2/17/2014    Procedure: COLONOSCOPY;  colon-screening / vandana;  Surgeon: Donovan Dinero MD;  Location:  OR       Social History   Substance Use Topics     Smoking status: Never Smoker     Smokeless tobacco: Never Used     Alcohol use 0.0 oz/week     0 Standard drinks or equivalent per week      Comment: occasional      Family History   Problem Relation Age of Onset     Hypertension Mother      Diabetes Father      Glaucoma Father      Lipids Brother      Macular Degeneration No family hx of          Current Outpatient Prescriptions   Medication Sig Dispense Refill     Alcohol Swabs (CVS ALCOHOL PREP PADS) 70 % PADS USE TO SWAB AREA OF INJECTION/MARY AS DIRECTED. 100 each 2     aspirin 81 MG EC tablet Take 1 tablet (81 mg) by mouth daily 90 tablet 3     atorvastatin (LIPITOR) 20 MG tablet TAKE 1 TABLET (20 MG) BY MOUTH DAILY 90 tablet 0     blood glucose calibration (NO BRAND SPECIFIED) solution To accompany: Blood Glucose Monitor Brands: per insurance. 1 Bottle 3     blood glucose monitoring (NO BRAND SPECIFIED) meter device kit Use to test blood sugar 1 times daily or as directed. Preferred blood glucose meter OR supplies to accompany: Blood Glucose Monitor Brands: per insurance. 1 kit 0     EPINEPHrine (EPIPEN/ADRENACLICK/OR ANY BX GENERIC EQUIV) 0.3 MG/0.3ML injection 2-pack Inject 0.3 mLs (0.3 mg) into the muscle once as needed for anaphylaxis 0.6 mL 0     fluticasone (FLONASE) 50 MCG/ACT nasal spray Spray 1-2 sprays into both nostrils daily 16 g 6     losartan (COZAAR) 25 MG tablet Take 0.5 tablets (12.5 mg) by mouth daily 45 tablet 3      metFORMIN (GLUCOPHAGE-XR) 500 MG 24 hr tablet TAKE 1 TABLET (500 MG) BY MOUTH DAILY (WITH DINNER) 90 tablet 1     ONETOUCH ULTRA test strip USE TO TEST 1 TIME DAILY OR AS DIRECETD 100 strip 5     albuterol (PROAIR HFA, PROVENTIL HFA, VENTOLIN HFA) 108 (90 BASE) MCG/ACT inhaler Inhale 2 puffs into the lungs every 6 hours as needed for shortness of breath / dyspnea or wheezing (Patient not taking: Reported on 8/2/2018) 3 Inhaler 1     Allergies   Allergen Reactions     Shellfish-Derived Products Anaphylaxis     Ace Inhibitors Cough     Recent Labs   Lab Test  08/20/18   0651  05/22/18   0658  11/10/17   0729  08/15/17   0654   08/12/14   0737  02/06/14   0842   A1C  6.6*  7.1*  6.4*  6.5*   < >   --    --    LDL   --    --   81  189*   --    --   177*   HDL   --    --   44*  40*   --    --   42*   TRIG   --    --   128  193*   --    --   135   ALT   --    --   27   --    --    --    --    CR   --   0.80   --   0.80   --    --   0.73   GFRESTIMATED   --   74   --   74   --    --   84   GFRESTBLACK   --   89   --   90   --    --   >90   POTASSIUM   --   4.3   --   3.9   --    --   4.1   TSH   --    --    --   1.34   --   1.21   --     < > = values in this interval not displayed.        Mammogram Screening: Patient over age 50, mutual decision to screen reflected in health maintenance.    Pertinent mammograms are reviewed under the imaging tab.    PAP / HPV Latest Ref Rng & Units 8/17/2017 2/6/2014   PAP - NIL NIL   HPV 16 DNA NEG:Negative Negative -   HPV 18 DNA NEG:Negative Negative -   OTHER HR HPV NEG:Negative Negative -     Reviewed and updated as needed this visit by clinical staff  Tobacco  Allergies  Meds  Med Hx  Surg Hx  Fam Hx  Soc Hx        Reviewed and updated as needed this visit by Provider        Past Medical History:   Diagnosis Date     Asthma      Diabetes (H)       Past Surgical History:   Procedure Laterality Date     COLONOSCOPY  2/17/2014    Procedure: COLONOSCOPY;  colon-screening /  "vandana;  Surgeon: Donovan Dinero MD;  Location: [facility] OR       Review of Systems   Constitutional: Negative for chills and fever.   HENT: Negative for congestion, ear pain, hearing loss and sore throat.    Eyes: Negative for pain and visual disturbance.   Respiratory: Negative for cough and shortness of breath.    Cardiovascular: Negative for chest pain, palpitations and peripheral edema.   Gastrointestinal: Negative for abdominal pain, constipation, diarrhea, heartburn, hematochezia and nausea.   Breasts:  Negative for tenderness, breast mass and discharge.   Genitourinary: Positive for frequency. Negative for dysuria, genital sores, hematuria, pelvic pain, urgency, vaginal bleeding and vaginal discharge.   Musculoskeletal: Negative for arthralgias, joint swelling and myalgias.   Skin: Negative for rash.        New lesion upper left leg     Neurological: Negative for dizziness, weakness, headaches and paresthesias.   Psychiatric/Behavioral: Negative for mood changes. The patient is not nervous/anxious.            OBJECTIVE:   /79 (BP Location: Right arm, Patient Position: Sitting, Cuff Size: Adult Regular)  Pulse 75  Temp 97.8  F (36.6  C) (Oral)  Ht 4' 11.5\" (1.511 m)  Wt 136 lb (61.7 kg)  SpO2 96%  BMI 27.01 kg/m2  Physical Exam  GENERAL APPEARANCE: healthy, alert and no distress  EYES: Eyes grossly normal to inspection, PERRL and conjunctivae and sclerae normal  HENT: ear canals and TM's normal, nose and mouth without ulcers or lesions, oropharynx clear and oral mucous membranes moist  NECK: no adenopathy, no asymmetry, masses, or scars and thyroid normal to palpation  RESP: lungs clear to auscultation - no rales, rhonchi or wheezes  BREAST: normal without masses, tenderness or nipple discharge and no palpable axillary masses or adenopathy  CV: regular rate and rhythm, normal S1 S2, no S3 or S4, no murmur, click or rub, no peripheral edema and peripheral pulses strong  ABDOMEN: soft, " "nontender, no hepatosplenomegaly, no masses and bowel sounds normal  MS: no musculoskeletal defects are noted and gait is age appropriate without ataxia  SKIN: no suspicious lesions or rashes   Hyperkeratotic lesion, irregular, at left upper leg  NEURO: Normal strength and tone, sensory exam grossly normal, mentation intact and speech normal  DIABETIC FOOT EXAM: normal DP and PT pulses, no trophic changes or ulcerative lesions and normal sensory exam  PSYCH: mentation appears normal and affect normal/bright    Diagnostic Test Results:  See orders.      ASSESSMENT/PLAN:       ICD-10-CM    1. Routine general medical examination at a health care facility Z00.00    2. Type 2 diabetes mellitus without complication, without long-term current use of insulin (H) E11.9 Hemoglobin A1c     Basic metabolic panel     Hemoglobin A1c     Albumin Random Urine Quantitative with Creat Ratio     OFFICE/OUTPT VISIT,EST,LEVL II   3. Screening for HIV (human immunodeficiency virus) Z11.4 HIV Screening   4. Hyperlipidemia LDL goal <100 E78.5 Lipid panel reflex to direct LDL Fasting   5. Screening for diabetic peripheral neuropathy Z13.89 FOOT EXAM  NO CHARGE [95409.114]       Agreeable to HIV screening  Discussed shingrix.   Should have skin lesion removed from left upper leg.  New since last year, irregular  Use inhaler in mornings to clear out phlegm.      COUNSELING:  Reviewed preventive health counseling, as reflected in patient instructions       Immunizations    Declined: Zoster due to Cost    Wants to check with insurance, but will probable get it.          BP Readings from Last 1 Encounters:   08/02/18 110/71     Estimated body mass index is 27.87 kg/(m^2) as calculated from the following:    Height as of 7/30/18: 4' 11\" (1.499 m).    Weight as of 8/2/18: 138 lb (62.6 kg).    BP Screening:   Last 3 BP Readings:    BP Readings from Last 3 Encounters:   12/06/18 124/79   08/02/18 110/71   07/31/18 115/67       The following was " recommended to the patient:  Re-screen BP within a year and recommended lifestyle modifications  Weight management plan: diabetic diet     reports that she has never smoked. She has never used smokeless tobacco.      Counseling Resources:  ATP IV Guidelines  Pooled Cohorts Equation Calculator  Breast Cancer Risk Calculator  FRAX Risk Assessment  ICSI Preventive Guidelines  Dietary Guidelines for Americans, 2010  [employer]'s MyPlate  ASA Prophylaxis  Lung CA Screening    Talia Soto MD  [facility]

## 2018-12-07 ASSESSMENT — ASTHMA QUESTIONNAIRES: ACT_TOTALSCORE: 25

## 2018-12-07 NOTE — PROGRESS NOTES
Karyn Hairston Malachi    Enclosed are your results.  Your labs are normal/stable at this time.     Please call  with any questions.  We can also discuss any questions regarding these labs at your next scheduled visit.    Sincerely,    Talia Soto M.D.

## 2018-12-28 DIAGNOSIS — E78.5 HYPERLIPIDEMIA LDL GOAL <100: ICD-10-CM

## 2018-12-28 DIAGNOSIS — E11.9 TYPE 2 DIABETES MELLITUS WITHOUT COMPLICATION, WITHOUT LONG-TERM CURRENT USE OF INSULIN (H): ICD-10-CM

## 2018-12-28 NOTE — TELEPHONE ENCOUNTER
"Requested Prescriptions   Pending Prescriptions Disp Refills     atorvastatin (LIPITOR) 20 MG tablet [Pharmacy Med Name: ATORVASTATIN 20 MG TABLET] 90 tablet 0     Sig: TAKE 1 TABLET (20 MG) BY MOUTH DAILY    Last Written Prescription Date:  09/17/18  Last Fill Quantity: 90,  # refills: 0   Last office visit: 12/6/2018 with prescribing provider:     Future Office Visit:          Statins Protocol Passed - 12/28/2018  4:02 PM       Passed - LDL on file in past 12 months    Recent Labs   Lab Test 12/06/18  0735   LDL 97            Passed - No abnormal creatine kinase in past 12 months    Recent Labs   Lab Test 11/10/17  0729                  Passed - Recent (12 mo) or future (30 days) visit within the authorizing provider's specialty    Patient had office visit in the last 12 months or has a visit in the next 30 days with authorizing provider or within the authorizing provider's specialty.  See \"Patient Info\" tab in inbasket, or \"Choose Columns\" in Meds & Orders section of the refill encounter.             Passed - Patient is age 18 or older       Passed - No active pregnancy on record       Passed - No positive pregnancy test in past 12 months        aspirin (ASA) 81 MG EC tablet [Pharmacy Med Name: CVS ASPIRIN EC 81 MG TABLET] 90 tablet 2     Sig: TAKE 1 TABLET (81 MG) BY MOUTH DAILY    Last Written Prescription Date:  12/08/2017  Last Fill Quantity: 90,  # refills: 3   Last office visit: 12/6/2018 with prescribing provider:       Future Office Visit:        Analgesics (Non-Narcotic Tylenol and ASA Only) Passed - 12/28/2018  4:02 PM       Passed - Recent (12 mo) or future (30 days) visit within the authorizing provider's specialty    Patient had office visit in the last 12 months or has a visit in the next 30 days with authorizing provider or within the authorizing provider's specialty.  See \"Patient Info\" tab in inbasket, or \"Choose Columns\" in Meds & Orders section of the refill encounter.             Passed " - Patient is age 20 years or older    If ASA is flagged for ages under 20 years old. Forward to provider for confirmation Ryes Syndrome is not a concern.

## 2018-12-31 RX ORDER — ATORVASTATIN CALCIUM 20 MG/1
TABLET, FILM COATED ORAL
Qty: 90 TABLET | Refills: 1 | Status: SHIPPED | OUTPATIENT
Start: 2018-12-31 | End: 2019-06-28

## 2018-12-31 NOTE — TELEPHONE ENCOUNTER
Prescription approved per [facility] [place] [facility].  Cherie Gonzales, RN [facility] Triage.

## 2019-03-18 DIAGNOSIS — E11.9 TYPE 2 DIABETES MELLITUS WITHOUT COMPLICATION, WITHOUT LONG-TERM CURRENT USE OF INSULIN (H): ICD-10-CM

## 2019-03-18 NOTE — TELEPHONE ENCOUNTER
"Requested Prescriptions   Pending Prescriptions Disp Refills     blood glucose monitoring (NO BRAND SPECIFIED) meter device kit 1 kit 0    Last Written Prescription Date:  8/17/17  Last Fill Quantity: 1,  # refills: 0   Last office visit: 12/6/2018 with prescribing provider:     Future Office Visit:     Sig: Use to test blood sugar 1 times daily or as directed. Blood Glucose Monitor Brands: per insurance.    Diabetic Supplies Protocol Passed - 3/18/2019  2:27 PM       Passed - Medication is active on med list       Passed - Patient is 18 years of age or older       Passed - Recent (6 mo) or future (30 days) visit within the authorizing provider's specialty    Patient had office visit in the last 6 months or has a visit in the next 30 days with authorizing provider.  See \"Patient Info\" tab in [employer], or \"Choose Columns\" in Meds & Orders section of the refill encounter.            blood glucose (NO BRAND SPECIFIED) test strip 100 strip 1    Last Written Prescription Date:  8/30/18  Last Fill Quantity: 100,  # refills: 5   Last office visit: 12/6/2018 with prescribing provider:     Future Office Visit:     Sig: Use to test blood sugar 1 times daily or as directed.    Diabetic Supplies Protocol Passed - 3/18/2019  2:27 PM       Passed - Medication is active on med list       Passed - Patient is 18 years of age or older       Passed - Recent (6 mo) or future (30 days) visit within the authorizing provider's specialty    Patient had office visit in the last 6 months or has a visit in the next 30 days with authorizing provider.  See \"Patient Info\" tab in [employer], or \"Choose Columns\" in Meds & Orders section of the refill encounter.              "

## 2019-03-18 NOTE — TELEPHONE ENCOUNTER
Received faxed request from [facility]. Ph. [phone] Fax [phone]    Medication: One Touch Ultra Blue Test Strip    Please send a new script for contour meter and strips. [place] Ultra test strips.

## 2019-03-19 NOTE — TELEPHONE ENCOUNTER
Prescription [place] per [facility] [place].  Isabella Day RN

## 2019-04-04 ENCOUNTER — TELEPHONE (OUTPATIENT)
Dept: FAMILY MEDICINE | Facility: CLINIC | Age: 59
End: 2019-04-04

## 2019-04-04 NOTE — TELEPHONE ENCOUNTER
Received faxed message from [facility]. Ph. [phone] Fax [phone]    Pt needs a new script for lancets for accu check.

## 2019-04-15 DIAGNOSIS — E11.9 TYPE 2 DIABETES MELLITUS WITHOUT COMPLICATION, WITHOUT LONG-TERM CURRENT USE OF INSULIN (H): Primary | ICD-10-CM

## 2019-04-15 RX ORDER — LANCETS
1 EACH MISCELLANEOUS DAILY
Qty: 100 EACH | Refills: 3 | Status: SHIPPED | OUTPATIENT
Start: 2019-04-15 | End: 2020-08-31

## 2019-04-15 NOTE — TELEPHONE ENCOUNTER
Routing refill request to provider for review/approval because:  Drug not active on patient's medication list      Flor Sweeney RN

## 2019-04-15 NOTE — TELEPHONE ENCOUNTER
microlet lancets      Last Written Prescription Date:  na  Last Fill Quantity: na,   # refills: na  Last Office Visit: n  Future Office visit:       Routing refill request to provider for review/approval because:  Drug not active on patient's medication list

## 2019-04-18 NOTE — TELEPHONE ENCOUNTER
TC sees that a script for microlet lancets was sent to [facility] on 04/15. Flagging for RN to unattach medication then sign/close encounter.

## 2019-04-23 ENCOUNTER — OFFICE VISIT (OUTPATIENT)
Dept: OPHTHALMOLOGY | Facility: CLINIC | Age: 59
End: 2019-04-23
Payer: COMMERCIAL

## 2019-04-23 DIAGNOSIS — Z01.01 ENCOUNTER FOR EXAMINATION OF EYES AND VISION WITH ABNORMAL FINDINGS: Primary | ICD-10-CM

## 2019-04-23 DIAGNOSIS — H52.4 PRESBYOPIA: ICD-10-CM

## 2019-04-23 DIAGNOSIS — H35.373 EPIRETINAL MEMBRANE (ERM) OF BOTH EYES: ICD-10-CM

## 2019-04-23 DIAGNOSIS — H25.813 COMBINED FORMS OF AGE-RELATED CATARACT OF BOTH EYES: ICD-10-CM

## 2019-04-23 DIAGNOSIS — E11.9 TYPE 2 DIABETES MELLITUS WITHOUT COMPLICATION, WITHOUT LONG-TERM CURRENT USE OF INSULIN (H): ICD-10-CM

## 2019-04-23 PROCEDURE — 92015 DETERMINE REFRACTIVE STATE: CPT | Performed by: OPHTHALMOLOGY

## 2019-04-23 PROCEDURE — 92014 COMPRE OPH EXAM EST PT 1/>: CPT | Performed by: OPHTHALMOLOGY

## 2019-04-23 ASSESSMENT — CONF VISUAL FIELD
OS_NORMAL: 1
OD_NORMAL: 1

## 2019-04-23 ASSESSMENT — SLIT LAMP EXAM - LIDS
COMMENTS: 2+ DERMATOCHALASIS
COMMENTS: 2+ DERMATOCHALASIS

## 2019-04-23 ASSESSMENT — REFRACTION_MANIFEST
OD_SPHERE: -0.25
OS_ADD: +2.50
OD_CYLINDER: +0.50
OS_AXIS: 002
OS_CYLINDER: +1.50
OD_AXIS: 002
OS_SPHERE: -0.75
OD_ADD: +2.50

## 2019-04-23 ASSESSMENT — CUP TO DISC RATIO
OD_RATIO: 0.3
OS_RATIO: 0.4

## 2019-04-23 ASSESSMENT — TONOMETRY
OS_IOP_MMHG: 18
OD_IOP_MMHG: 16
IOP_METHOD: APPLANATION

## 2019-04-23 ASSESSMENT — VISUAL ACUITY
OS_SC+: -1
OD_SC: 20/20
OD_SC+: -2
OS_SC: 20/30
METHOD: SNELLEN - LINEAR

## 2019-04-23 ASSESSMENT — EXTERNAL EXAM - LEFT EYE: OS_EXAM: NORMAL

## 2019-04-23 ASSESSMENT — EXTERNAL EXAM - RIGHT EYE: OD_EXAM: NORMAL

## 2019-04-23 NOTE — LETTER
4/23/2019         RE: Karyn Ly  [address] [facility] [id number]        Dear Colleague,    Thank you for referring your patient, Karyn Ly, to the [facility]. Please see a copy of my visit note below.     Current Eye Medications:  None.     Subjective:  Patient is here for a Diabetic Eye Exam.   No vision changes or concerns.  She wears prescription reading glasses, which are working adequately (she didn't bring them today).  Distance vision is good without correction.      Lab Results   Component Value Date    A1C 6.6 12/06/2018    A1C 6.6 08/20/2018    A1C 7.1 05/22/2018    A1C 6.4 11/10/2017    A1C 6.5 08/15/2017        Objective:  See Ophthalmology Exam.       Assessment:  Mild cataract both eyes.  No diabetic retinopathy.    ICD-10-CM    1. Encounter for examination of eyes and vision with abnormal findings Z01.01 REFRACTIVE STATUS   2. Presbyopia H52.4 REFRACTIVE STATUS   3. Type 2 diabetes mellitus without complication, without long-term current use of insulin (H) E11.9 EYE EXAM (SIMPLE-NONBILLABLE)   4. Combined forms of age-related cataract, mild, of both eyes H25.813    5. Epiretinal membrane, mild, od > os H35.373         Plan:  Glasses Rx given - optional  Possible posterior vitreous detachment (sudden onset large floater and/or flashing lights) both eyes discussed.  Call in December 2019 for an appointment in April 2020 for Complete Exam    Dr. Lou [phone]         Again, thank you for allowing me to participate in the care of your patient.        Sincerely,        Montana Lou MD

## 2019-04-23 NOTE — PROGRESS NOTES
Current Eye Medications:  None.     Subjective:  Patient is here for a Diabetic Eye Exam.   No vision changes or concerns.  She wears prescription reading glasses, which are working adequately (she didn't bring them today).  Distance vision is good without correction.      Lab Results   Component Value Date    A1C 6.6 12/06/2018    A1C 6.6 08/20/2018    A1C 7.1 05/22/2018    A1C 6.4 11/10/2017    A1C 6.5 08/15/2017        Objective:  See Ophthalmology Exam.       Assessment:  Mild cataract both eyes.  No diabetic retinopathy.    ICD-10-CM    1. Encounter for examination of eyes and vision with abnormal findings Z01.01 REFRACTIVE STATUS   2. Presbyopia H52.4 REFRACTIVE STATUS   3. Type 2 diabetes mellitus without complication, without long-term current use of insulin (H) E11.9 EYE EXAM (SIMPLE-NONBILLABLE)   4. Combined forms of age-related cataract, mild, of both eyes H25.813    5. Epiretinal membrane, mild, od > os H35.373         Plan:  Glasses Rx given - optional  Possible posterior vitreous detachment (sudden onset large floater and/or flashing lights) both eyes discussed.  Call in December 2019 for an appointment in April 2020 for Complete Exam    Dr. Lou [phone]

## 2019-04-23 NOTE — PATIENT INSTRUCTIONS
Glasses Rx given - optional  Possible posterior vitreous detachment (sudden onset large floater and/or flashing lights) both eyes discussed.  Call in December 2019 for an appointment in April 2020 for Complete Exam    Dr. Lou [phone]    Patient Education   Diabetes weakens the blood vessels all over the body, including the eyes. Damage to the blood vessels in the eyes can cause swelling or bleeding into part of the eye (called the retina). This is called diabetic retinopathy (MONIQUE-tin--[facility]-thee). If not treated, this disease can cause vision loss or blindness.   Symptoms may include blurred or distorted vision, but many people have no symptoms. It's important to see your eye doctor regularly to check for problems.   Early treatment and good control can help protect your vision. Here are the things you can do to help prevent vision loss:      1. Keep your blood sugar levels under tight control.      2. Bring high blood pressure under control.      3. No smoking.      4. Have yearly dilated eye exams.

## 2019-04-25 ENCOUNTER — TELEPHONE (OUTPATIENT)
Dept: FAMILY MEDICINE | Facility: CLINIC | Age: 59
End: 2019-04-25

## 2019-04-25 NOTE — TELEPHONE ENCOUNTER
Forms received from: [facility]   Phone number listed: [phone]   Fax listed: [phone]  Date received: 04/25/2019  Form description: Prescription  Once forms are completed, please return to [facility] via fax.  Is patient requesting to be contacted when forms are completed: NA    Form placed: In provider's basket  Valentina Cm

## 2019-04-25 NOTE — TELEPHONE ENCOUNTER
TC contacted patient and she did not request anything from this pharmacy. She gets her diabetic supplies at [facility]. Form faxed back with denial notification.

## 2019-05-28 PROBLEM — H25.813 COMBINED FORMS OF AGE-RELATED CATARACT OF BOTH EYES: Status: ACTIVE | Noted: 2019-05-28

## 2019-06-03 DIAGNOSIS — E11.9 TYPE 2 DIABETES MELLITUS WITHOUT COMPLICATION, WITHOUT LONG-TERM CURRENT USE OF INSULIN (H): ICD-10-CM

## 2019-06-03 LAB
ANION GAP SERPL CALCULATED.3IONS-SCNC: 7 MMOL/L (ref 3–14)
BUN SERPL-MCNC: 13 MG/DL (ref 7–30)
CALCIUM SERPL-MCNC: 9 MG/DL (ref 8.5–10.1)
CHLORIDE SERPL-SCNC: 105 MMOL/L (ref 94–109)
CO2 SERPL-SCNC: 27 MMOL/L (ref 20–32)
CREAT SERPL-MCNC: 0.69 MG/DL (ref 0.52–1.04)
GFR SERPL CREATININE-BSD FRML MDRD: >90 ML/MIN/{1.73_M2}
GLUCOSE SERPL-MCNC: 125 MG/DL (ref 70–99)
HBA1C MFR BLD: 6.8 % (ref 0–5.6)
POTASSIUM SERPL-SCNC: 4.2 MMOL/L (ref 3.4–5.3)
SODIUM SERPL-SCNC: 139 MMOL/L (ref 133–144)

## 2019-06-03 PROCEDURE — 36415 COLL VENOUS BLD VENIPUNCTURE: CPT | Performed by: INTERNAL MEDICINE

## 2019-06-03 PROCEDURE — 80048 BASIC METABOLIC PNL TOTAL CA: CPT | Performed by: INTERNAL MEDICINE

## 2019-06-03 PROCEDURE — 83036 HEMOGLOBIN GLYCOSYLATED A1C: CPT | Performed by: INTERNAL MEDICINE

## 2019-06-28 DIAGNOSIS — E11.9 TYPE 2 DIABETES MELLITUS WITHOUT COMPLICATION, WITHOUT LONG-TERM CURRENT USE OF INSULIN (H): ICD-10-CM

## 2019-06-28 DIAGNOSIS — E78.5 HYPERLIPIDEMIA LDL GOAL <100: ICD-10-CM

## 2019-06-28 NOTE — TELEPHONE ENCOUNTER
"Requested Prescriptions   Pending Prescriptions Disp Refills     atorvastatin (LIPITOR) 20 MG tablet [Pharmacy Med Name: Atorvastatin Calcium Oral Tablet 20 MG] 60 tablet 0     Sig: TAKE ONE TABLET BY MOUTH ONE TIME DAILY   Last Written Prescription Date:  12-31-18  Last Fill Quantity: 90,  # refills: 1   Last office visit: 12/6/2018 with prescribing provider:  12-6-18   Future Office Visit:        Statins Protocol Passed - 6/28/2019  1:03 PM        Passed - LDL on file in past 12 months     Recent Labs   Lab Test 12/06/18  0735   LDL 97             Passed - No abnormal creatine kinase in past 12 months     Recent Labs   Lab Test 11/10/17  0729                   Passed - Recent (12 mo) or future (30 days) visit within the authorizing provider's specialty     Patient had office visit in the last 12 months or has a visit in the next 30 days with authorizing provider or within the authorizing provider's specialty.  See \"Patient Info\" tab in inbasket, or \"Choose Columns\" in Meds & Orders section of the refill encounter.              Passed - Medication is active on med list        Passed - Patient is age 18 or older        Passed - No active pregnancy on record        Passed - No positive pregnancy test in past 12 months        metFORMIN (GLUCOPHAGE-XR) 500 MG 24 hr tablet [Pharmacy Med Name: metFORMIN HCl ER Oral Tablet Extended Release 24 Hour 500 MG] 30 tablet 0     Sig: TAKE ONE TABLET BY MOUTH ONCE DAILY WITH DINNER   Last Written Prescription Date:  11-6-18  Last Fill Quantity: 90,  # refills: 1   Last office visit: 12/6/2018 with prescribing provider:     Future Office Visit:        Biguanide Agents Failed - 6/28/2019  1:03 PM        Failed - Blood pressure less than 140/90 in past 6 months     BP Readings from Last 3 Encounters:   12/06/18 124/79   08/02/18 110/71   07/31/18 115/67                 Failed - Recent (6 mo) or future (30 days) visit within the authorizing provider's specialty     Patient had " "office visit in the last 6 months or has a visit in the next 30 days with authorizing provider or within the authorizing provider's specialty.  See \"Patient Info\" tab in inbasket, or \"Choose Columns\" in Meds & Orders section of the refill encounter.            Passed - Patient has documented LDL within the past 12 mos.     Recent Labs   Lab Test 12/06/18  0735   LDL 97             Passed - Patient has had a Microalbumin in the past 15 mos.     Recent Labs   Lab Test 05/22/18  0700   MICROL 14   UMALCR 4.81             Passed - Patient is age 10 or older        Passed - Patient has documented A1c within the specified period of time.     If HgbA1C is 8 or greater, it needs to be on file within the past 3 months.  If less than 8, must be on file within the past 6 months.     Recent Labs   Lab Test 06/03/19  0942   A1C 6.8*             Passed - Patient's CR is NOT>1.4 OR Patient's EGFR is NOT<45 within past 12 mos.     Recent Labs   Lab Test 06/03/19  0942   GFRESTIMATED >90   GFRESTBLACK >90       Recent Labs   Lab Test 06/03/19  0942   CR 0.69             Passed - Patient does NOT have a diagnosis of CHF.        Passed - Medication is active on med list        Passed - Patient is not pregnant        Passed - Patient has not had a positive pregnancy test within the past 12 mos.           "

## 2019-07-01 RX ORDER — ATORVASTATIN CALCIUM 20 MG/1
TABLET, FILM COATED ORAL
Qty: 90 TABLET | Refills: 1 | Status: SHIPPED | OUTPATIENT
Start: 2019-07-01 | End: 2019-10-28

## 2019-07-01 RX ORDER — METFORMIN HCL 500 MG
TABLET, EXTENDED RELEASE 24 HR ORAL
Qty: 30 TABLET | Refills: 0 | Status: SHIPPED | OUTPATIENT
Start: 2019-07-01 | End: 2019-07-08

## 2019-07-01 NOTE — TELEPHONE ENCOUNTER
See plan at last visit with [facility] on 12/6/18:    Instructions         Return in about 6 months (around 6/6/2019) for diabetes follow up.         Medication metformin is being filled for 1 time refill only due to:  Patient needs to be seen because due per plan.      Atorvastatin: Prescription approved per [facility] [place].    Encounter routed to team to reach out to patient to schedule diabetes follow up.    Keyanna Reveles RN  [facility]

## 2019-07-07 PROBLEM — E11.9 TYPE 2 DIABETES MELLITUS WITHOUT COMPLICATION, WITHOUT LONG-TERM CURRENT USE OF INSULIN (H): Chronic | Status: ACTIVE | Noted: 2017-11-30

## 2019-07-08 ENCOUNTER — OFFICE VISIT (OUTPATIENT)
Dept: FAMILY MEDICINE | Facility: CLINIC | Age: 59
End: 2019-07-08
Payer: COMMERCIAL

## 2019-07-08 VITALS
SYSTOLIC BLOOD PRESSURE: 106 MMHG | TEMPERATURE: 98 F | BODY MASS INDEX: 27.6 KG/M2 | WEIGHT: 139 LBS | OXYGEN SATURATION: 97 % | DIASTOLIC BLOOD PRESSURE: 70 MMHG

## 2019-07-08 DIAGNOSIS — J45.20 MILD INTERMITTENT ASTHMA WITHOUT COMPLICATION: ICD-10-CM

## 2019-07-08 DIAGNOSIS — E78.5 HYPERLIPIDEMIA LDL GOAL <100: ICD-10-CM

## 2019-07-08 DIAGNOSIS — Z23 NEED FOR PROPHYLACTIC VACCINATION WITH TETANUS-DIPHTHERIA (TD): ICD-10-CM

## 2019-07-08 DIAGNOSIS — E11.9 TYPE 2 DIABETES MELLITUS WITHOUT COMPLICATION, WITHOUT LONG-TERM CURRENT USE OF INSULIN (H): Primary | ICD-10-CM

## 2019-07-08 PROCEDURE — 99214 OFFICE O/P EST MOD 30 MIN: CPT | Mod: 25 | Performed by: INTERNAL MEDICINE

## 2019-07-08 PROCEDURE — 90471 IMMUNIZATION ADMIN: CPT | Performed by: INTERNAL MEDICINE

## 2019-07-08 PROCEDURE — 90714 TD VACC NO PRESV 7 YRS+ IM: CPT | Performed by: INTERNAL MEDICINE

## 2019-07-08 RX ORDER — METFORMIN HCL 500 MG
500 TABLET, EXTENDED RELEASE 24 HR ORAL
Qty: 90 TABLET | Refills: 3 | Status: SHIPPED | OUTPATIENT
Start: 2019-07-08 | End: 2020-05-13

## 2019-07-08 ASSESSMENT — ASTHMA QUESTIONNAIRES
ACT_TOTALSCORE: 25
QUESTION_5 LAST FOUR WEEKS HOW WOULD YOU RATE YOUR ASTHMA CONTROL: COMPLETELY CONTROLLED
QUESTION_2 LAST FOUR WEEKS HOW OFTEN HAVE YOU HAD SHORTNESS OF BREATH: NOT AT ALL
QUESTION_4 LAST FOUR WEEKS HOW OFTEN HAVE YOU USED YOUR RESCUE INHALER OR NEBULIZER MEDICATION (SUCH AS ALBUTEROL): NOT AT ALL
QUESTION_3 LAST FOUR WEEKS HOW OFTEN DID YOUR ASTHMA SYMPTOMS (WHEEZING, COUGHING, SHORTNESS OF BREATH, CHEST TIGHTNESS OR PAIN) WAKE YOU UP AT NIGHT OR EARLIER THAN USUAL IN THE MORNING: NOT AT ALL
QUESTION_1 LAST FOUR WEEKS HOW MUCH OF THE TIME DID YOUR ASTHMA KEEP YOU FROM GETTING AS MUCH DONE AT WORK, SCHOOL OR AT HOME: NONE OF THE TIME
ACUTE_EXACERBATION_TODAY: NO

## 2019-07-08 NOTE — PROGRESS NOTES
Subjective     Karyn Ly is a 58 year old female who presents to clinic today for the following health issues:    57 y/o F here for DM follow up.   Also H/o Mild Intermittent asthma & hyperlipidemia.   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 Has 2 children ('83 & '84) who still live in [facility]... ...    Recent A1C is 6.8...       She will travel to [facility] in August with family      HPI   Diabetes Follow-up      How often are you checking your blood sugar? One time daily    What time of day are you checking your blood sugars (select all that apply)?  Before meals    Have you had any blood sugars above 200?  No    Have you had any blood sugars below 70?  No    What symptoms do you notice when your blood sugar is low?  Other: hunger    What concerns do you have today about your diabetes? None and Other: 139-140 BS     Do you have any of these symptoms? (Select all that apply)  No numbness or tingling in feet.  No redness, sores or blisters on feet.  No complaints of excessive thirst.  No reports of blurry vision.  No significant changes to weight.     Have you had a diabetic eye exam in the last 12 months? Yes- Date of last eye exam: 4/2019    BP Readings from Last 2 Encounters:   07/08/19 106/70   12/06/18 124/79     Hemoglobin A1C (%)   Date Value   06/03/2019 6.8 (H)   12/06/2018 6.6 (H)     LDL Cholesterol Calculated (mg/dL)   Date Value   12/06/2018 97   11/10/2017 81       Diabetes Management Resources    Amount of exercise or physical activity: 6-7 days/week for an average of 30-45 minutes    Problems taking medications regularly: No    Medication side effects: none    Diet: no shellfish            Patient Active Problem List   Diagnosis     Hyperlipidemia LDL goal <100     Mild intermittent asthma     Type 2 diabetes mellitus without complication, without long-term current use of insulin (H)     Epiretinal membrane, mild, od > os     Shellfish allergy     Combined  forms of age-related cataract, mild, of both eyes     Past Surgical History:   Procedure Laterality Date     COLONOSCOPY  2/17/2014    Procedure: COLONOSCOPY;  colon-screening / vandana;  Surgeon: Donovan Dinero MD;  Location:  OR       Social History     Tobacco Use     Smoking status: Never Smoker     Smokeless tobacco: Never Used   Substance Use Topics     Alcohol use: Yes     Alcohol/week: 0.0 oz     Comment: occasional      Family History   Problem Relation Age of Onset     Hypertension Mother      Diabetes Father      Glaucoma Father      Lipids Brother      Macular Degeneration No family hx of          Current Outpatient Medications   Medication Sig Dispense Refill     albuterol (PROAIR HFA, PROVENTIL HFA, VENTOLIN HFA) 108 (90 BASE) MCG/ACT inhaler Inhale 2 puffs into the lungs every 6 hours as needed for shortness of breath / dyspnea or wheezing 3 Inhaler 1     aspirin (ASA) 81 MG EC tablet TAKE 1 TABLET (81 MG) BY MOUTH DAILY 90 tablet 1     atorvastatin (LIPITOR) 20 MG tablet TAKE ONE TABLET BY MOUTH ONE TIME DAILY  90 tablet 1     blood glucose (NO BRAND SPECIFIED) test strip Use to test blood sugar 1 times daily or as directed. 100 strip 1     blood glucose monitoring (NO BRAND SPECIFIED) meter device kit Use to test blood sugar 1 times daily or as directed. Blood Glucose Monitor Brands: per insurance. 1 kit 0     EPINEPHrine (EPIPEN/ADRENACLICK/OR ANY BX GENERIC EQUIV) 0.3 MG/0.3ML injection 2-pack Inject 0.3 mLs (0.3 mg) into the muscle once as needed for anaphylaxis 0.6 mL 0     losartan (COZAAR) 25 MG tablet Take 0.5 tablets (12.5 mg) by mouth daily 45 tablet 3     metFORMIN (GLUCOPHAGE-XR) 500 MG 24 hr tablet Take 1 tablet (500 mg) by mouth daily (with dinner) 90 tablet 3     MICROLET LANCETS MISC 1 each daily 100 each 3     fluticasone (FLONASE) 50 MCG/ACT nasal spray Spray 1-2 sprays into both nostrils daily (Patient not taking: Reported on 7/8/2019) 16 g 6     Allergies   Allergen  Reactions     Shellfish-Derived Products Anaphylaxis     Ace Inhibitors Cough     Recent Labs   Lab Test 06/03/19  0942 12/06/18  0735 08/20/18  0651 05/22/18  0658 11/10/17  0729 08/15/17  0654  08/12/14  0737   A1C 6.8* 6.6* 6.6* 7.1* 6.4* 6.5*   < >  --    LDL  --  97  --   --  81 189*  --   --    HDL  --  48*  --   --  44* 40*  --   --    TRIG  --  126  --   --  128 193*  --   --    ALT  --   --   --   --  27  --   --   --    CR 0.69  --   --  0.80  --  0.80  --   --    GFRESTIMATED >90  --   --  74  --  74  --   --    GFRESTBLACK >90  --   --  89  --  90  --   --    POTASSIUM 4.2  --   --  4.3  --  3.9  --   --    TSH  --   --   --   --   --  1.34  --  1.21    < > = values in this interval not displayed.      BP Readings from Last 3 Encounters:   07/08/19 106/70   12/06/18 124/79   08/02/18 110/71    Wt Readings from Last 3 Encounters:   07/08/19 63 kg (139 lb)   12/06/18 61.7 kg (136 lb)   08/02/18 62.6 kg (138 lb)                       Reviewed and updated as needed this visit by Provider         Review of Systems   ROS COMP: Constitutional, HEENT, cardiovascular, pulmonary, gi and gu systems are negative, except as otherwise noted.      Objective    /70 (BP Location: Left arm, Patient Position: Sitting, Cuff Size: Adult Regular)   Temp 98  F (36.7  C) (Oral)   Wt 63 kg (139 lb)   SpO2 97%   BMI 27.60 kg/m    Body mass index is 27.6 kg/m .  Physical Exam   GENERAL: healthy, alert and no distress  EYES: Eyes grossly normal to inspection, PERRL and conjunctivae and sclerae normal  RESP: lungs clear to auscultation - no rales, rhonchi or wheezes  CV: regular rate and rhythm, normal S1 S2, no S3 or S4, no murmur, click or rub, no peripheral edema and peripheral pulses strong  MS: no gross musculoskeletal defects noted, no edema  PSYCH: mentation appears normal, affect normal/bright  Diabetic foot exam: normal DP and PT pulses, no trophic changes or ulcerative lesions and normal sensory  exam    Diagnostic Test Results:  Labs reviewed in Epic  Results for orders placed or performed in visit on 06/03/19   Hemoglobin A1c   Result Value Ref Range    Hemoglobin A1C 6.8 (H) 0 - 5.6 %   Basic metabolic panel   Result Value Ref Range    Sodium 139 133 - 144 mmol/L    Potassium 4.2 3.4 - 5.3 mmol/L    Chloride 105 94 - 109 mmol/L    Carbon Dioxide 27 20 - 32 mmol/L    Anion Gap 7 3 - 14 mmol/L    Glucose 125 (H) 70 - 99 mg/dL    Urea Nitrogen 13 7 - 30 mg/dL    Creatinine 0.69 0.52 - 1.04 mg/dL    GFR Estimate >90 >60 mL/min/[1.73_m2]    GFR Estimate If Black >90 >60 mL/min/[1.73_m2]    Calcium 9.0 8.5 - 10.1 mg/dL           Assessment & Plan       ICD-10-CM    1. Type 2 diabetes mellitus without complication, without long-term current use of insulin (H) E11.9 Albumin Random Urine Quantitative with Creat Ratio     Basic metabolic panel     Hemoglobin A1c     TSH with free T4 reflex     metFORMIN (GLUCOPHAGE-XR) 500 MG 24 hr tablet   2. Mild intermittent asthma without complication J45.20    3. Hyperlipidemia LDL goal <100 E78.5 Lipid panel reflex to direct LDL Fasting   4. Need for prophylactic vaccination with tetanus-diphtheria (Td) Z23 TD (ADULT, 7+) PRESERVE FREE     VACCINE ADMINISTRATION, INITIAL        Patient Instructions   Look into shingles vaccine series (Shingrix)    Return to clinic 6 months (January 2020) for physical exam and diabetes recheck       Get fasting labs prior      Return in about 6 months (around 1/8/2020) for Physical Exam, diabetes follow up, Lab Work.    Talia Soto MD  [facility]

## 2019-07-08 NOTE — NURSING NOTE
Prior to injection, verified patient identity using patient's name and date of birth.  Due to injection administration, patient instructed to remain in clinic for 15 minutes  afterwards, and to report any adverse reaction to me immediately.    TD    Drug Amount Wasted:  None.  Vial/Syringe: Syringe  Expiration Date:  10/23/20  Ginna Lyon ma

## 2019-07-08 NOTE — PATIENT INSTRUCTIONS
Look into shingles vaccine series (Shingrix)    Return to clinic 6 months (January 2020) for physical exam and diabetes recheck       Get fasting labs prior

## 2019-07-08 NOTE — NURSING NOTE
Screening Questionnaire for Adult Immunization    Are you sick today?   No   Do you have allergies to medications, food, a vaccine component or latex?   Yes   Have you ever had a serious reaction after receiving a vaccination?   No   Do you have a long-term health problem with heart disease, lung disease, asthma, kidney disease, metabolic disease (e.g. diabetes), anemia, or other blood disorder?   Yes   Do you have cancer, leukemia, HIV/AIDS, or any other immune system problem?   No   In the past 3 months, have you taken medications that affect  your immune system, such as prednisone, other steroids, or anticancer drugs; drugs for the treatment of rheumatoid arthritis, Crohn s disease, or psoriasis; or have you had radiation treatments?   No   Have you had a seizure, or a brain or other nervous system problem?   No   During the past year, have you received a transfusion of blood or blood     products, or been given immune (gamma) globulin or antiviral drug?   No   For women: Are you pregnant or is there a chance you could become        pregnant during the next month?   No   Have you received any vaccinations in the past 4 weeks?   No     Immunization questionnaire was positive for at least one answer.  Notified Dr. Soto.        . Patient instructed to remain in clinic for 15 minutes afterwards, and to report any adverse reaction to me immediately.  Due to injection administration, patient instructed to remain in clinic for 15 minutes  afterwards, and to report any adverse reaction to me immediately.  Vaccine information supplied.       Screening performed by Ginna Lyon on 7/8/2019 at 10:08 AM.

## 2019-07-09 ASSESSMENT — ASTHMA QUESTIONNAIRES: ACT_TOTALSCORE: 25

## 2019-07-30 DIAGNOSIS — E11.9 TYPE 2 DIABETES MELLITUS WITHOUT COMPLICATION, WITHOUT LONG-TERM CURRENT USE OF INSULIN (H): ICD-10-CM

## 2019-07-30 NOTE — TELEPHONE ENCOUNTER
"Requested Prescriptions   Pending Prescriptions Disp Refills     blood glucose (NO BRAND SPECIFIED) test strip 100 strip 1     Sig: Use to test blood sugar 1 times daily or as directed.   Last Written Prescription Date:  3-19-19  Last Fill Quantity: 100,  # refills: 1   Last office visit: 7/8/2019 with prescribing provider:  7-8-19   Future Office Visit:        Diabetic Supplies Protocol Passed - 7/30/2019  3:59 PM        Passed - Medication is active on med list        Passed - Patient is 18 years of age or older        Passed - Recent (6 mo) or future (30 days) visit within the authorizing provider's specialty     Patient had office visit in the last 6 months or has a visit in the next 30 days with authorizing provider.  See \"Patient Info\" tab in inbasket, or \"Choose Columns\" in Meds & Orders section of the refill encounter.            aspirin (ASA) 81 MG EC tablet 90 tablet 1     Sig: Take 1 tablet (81 mg) by mouth daily   Last Written Prescription Date:  4-15-19  Last Fill Quantity: 90,  # refills: 1   Last office visit: 7/8/2019 with prescribing provider:     Future Office Visit:        Analgesics (Non-Narcotic Tylenol and ASA Only) Passed - 7/30/2019  3:59 PM        Passed - Recent (12 mo) or future (30 days) visit within the authorizing provider's specialty     Patient had office visit in the last 12 months or has a visit in the next 30 days with authorizing provider or within the authorizing provider's specialty.  See \"[facility]\" [facility], or \"Choose Columns\" in Meds & Orders section of the refill encounter.              Passed - Patient is age 20 years or older     If ASA is flagged for ages under 20 years old. Forward to provider for confirmation Ryes Syndrome is not a concern.              Passed - Medication is active on med list          "

## 2019-08-02 NOTE — TELEPHONE ENCOUNTER
Prescription approved per [facility] Refill Protocol.    Keyanna Reveles RN  [facility]

## 2019-08-05 ENCOUNTER — ANCILLARY PROCEDURE (OUTPATIENT)
Dept: MAMMOGRAPHY | Facility: CLINIC | Age: 59
End: 2019-08-05
Payer: COMMERCIAL

## 2019-08-05 DIAGNOSIS — Z12.31 VISIT FOR SCREENING MAMMOGRAM: ICD-10-CM

## 2019-08-05 PROCEDURE — 77067 SCR MAMMO BI INCL CAD: CPT | Mod: TC

## 2019-09-18 DIAGNOSIS — E11.9 TYPE 2 DIABETES MELLITUS WITHOUT COMPLICATION, WITHOUT LONG-TERM CURRENT USE OF INSULIN (H): ICD-10-CM

## 2019-09-18 DIAGNOSIS — E78.5 HYPERLIPIDEMIA LDL GOAL <100: ICD-10-CM

## 2019-09-18 NOTE — TELEPHONE ENCOUNTER
"Requested Prescriptions   Pending Prescriptions Disp Refills     atorvastatin (LIPITOR) 20 MG tablet [Pharmacy Med Name: Atorvastatin Calcium Oral Tablet 20 MG] 90 tablet 0     Sig: TAKE ONE TABLET BY MOUTH ONE TIME DAILY   Last Written Prescription Date:  7/1/19  Last Fill Quantity: 90,  # refills: 1   Last office visit: 7/8/2019 with prescribing provider:     Future Office Visit:        Statins Protocol Passed - 9/18/2019  4:54 PM        Passed - LDL on file in past 12 months     Recent Labs   Lab Test 12/06/18  0735   LDL 97             Passed - No abnormal creatine kinase in past 12 months     Recent Labs   Lab Test 11/10/17  0729                   Passed - Recent (12 mo) or future (30 days) visit within the authorizing provider's specialty     Patient had office visit in the last 12 months or has a visit in the next 30 days with authorizing provider or within the authorizing provider's specialty.  See \"Patient Info\" tab in inbasket, or \"Choose Columns\" in Meds & Orders section of the refill encounter.              Passed - Medication is active on med list        Passed - Patient is age 18 or older        Passed - No active pregnancy on record        Passed - No positive pregnancy test in past 12 months          "

## 2019-09-19 RX ORDER — ATORVASTATIN CALCIUM 20 MG/1
TABLET, FILM COATED ORAL
Qty: 90 TABLET | Refills: 0 | OUTPATIENT
Start: 2019-09-19

## 2019-10-17 ENCOUNTER — ALLIED HEALTH/NURSE VISIT (OUTPATIENT)
Dept: NURSING | Facility: CLINIC | Age: 59
End: 2019-10-17
Payer: COMMERCIAL

## 2019-10-17 DIAGNOSIS — Z23 NEED FOR PROPHYLACTIC VACCINATION AND INOCULATION AGAINST INFLUENZA: Primary | ICD-10-CM

## 2019-10-17 PROCEDURE — 99207 ZZC NO CHARGE NURSE ONLY: CPT

## 2019-10-17 PROCEDURE — 90682 RIV4 VACC RECOMBINANT DNA IM: CPT

## 2019-10-17 PROCEDURE — 90471 IMMUNIZATION ADMIN: CPT

## 2019-10-28 DIAGNOSIS — E11.9 TYPE 2 DIABETES MELLITUS WITHOUT COMPLICATION, WITHOUT LONG-TERM CURRENT USE OF INSULIN (H): ICD-10-CM

## 2019-10-28 DIAGNOSIS — E78.5 HYPERLIPIDEMIA LDL GOAL <100: ICD-10-CM

## 2019-10-28 RX ORDER — ATORVASTATIN CALCIUM 20 MG/1
TABLET, FILM COATED ORAL
Qty: 90 TABLET | Refills: 0 | Status: SHIPPED | OUTPATIENT
Start: 2019-10-28 | End: 2020-03-05

## 2019-10-28 NOTE — TELEPHONE ENCOUNTER
"Requested Prescriptions   Pending Prescriptions Disp Refills     atorvastatin (LIPITOR) 20 MG tablet [Pharmacy Med Name: Atorvastatin Calcium Oral Tablet 20 MG] 90 tablet 0     Sig: TAKE ONE TABLET BY MOUTH ONE TIME DAILY   Last Written Prescription Date:  7-1-19  Last Fill Quantity: 90,  # refills: 1   Last office visit: 7/8/2019 with prescribing provider:  7-8-19   Future Office Visit:        Statins Protocol Passed - 10/28/2019 12:21 PM        Passed - LDL on file in past 12 months     Recent Labs   Lab Test 12/06/18  0735   LDL 97             Passed - No abnormal creatine kinase in past 12 months     Recent Labs   Lab Test 11/10/17  0729                   Passed - Recent (12 mo) or future (30 days) visit within the authorizing provider's specialty     Patient has had an office visit with the authorizing provider or a provider within the authorizing providers department within the previous 12 mos or has a future within next 30 days. See \"Patient Info\" tab in inbasket, or \"Choose Columns\" in Meds & Orders section of the refill encounter.              Passed - Medication is active on med list        Passed - Patient is age 18 or older        Passed - No active pregnancy on record        Passed - No positive pregnancy test in past 12 months          "

## 2019-10-28 NOTE — TELEPHONE ENCOUNTER
Prescription approved per [facility] Refill Protocol.    Nishi Dc, RN, BSN, PHN  [facility] [place]

## 2019-11-05 DIAGNOSIS — E11.9 TYPE 2 DIABETES MELLITUS WITHOUT COMPLICATION, WITHOUT LONG-TERM CURRENT USE OF INSULIN (H): ICD-10-CM

## 2019-11-05 NOTE — TELEPHONE ENCOUNTER
"Requested Prescriptions   Pending Prescriptions Disp Refills     losartan (COZAAR) 25 MG tablet [Pharmacy Med Name: Losartan Potassium Oral Tablet 25 MG] 30 tablet 0     Sig: TAKE 1/2 TABLET BY MOUTH ONCE DAILY   Last Written Prescription Date:  12/6/18  Last Fill Quantity: 45,  # refills: 3   Last office visit: 7/8/2019 with prescribing provider:     Future Office Visit:        Angiotensin-II Receptors Passed - 11/5/2019  2:50 PM        Passed - Last blood pressure under 140/90 in past 12 months     BP Readings from Last 3 Encounters:   07/08/19 106/70   12/06/18 124/79   08/02/18 110/71                 Passed - Recent (12 mo) or future (30 days) visit within the authorizing provider's specialty     Patient has had an office visit with the authorizing provider or a provider within the authorizing providers department within the previous 12 mos or has a future within next 30 days. See \"Patient Info\" tab in inbasket, or \"Choose Columns\" in Meds & Orders section of the refill encounter.              Passed - Medication is active on med list        Passed - Patient is age 18 or older        Passed - No active pregnancy on record        Passed - Normal serum creatinine on file in past 12 months     Recent Labs   Lab Test 06/03/19  0942   CR 0.69             Passed - Normal serum potassium on file in past 12 months     Recent Labs   Lab Test 06/03/19  0942   POTASSIUM 4.2                    Passed - No positive pregnancy test in past 12 months          "

## 2019-11-06 RX ORDER — LOSARTAN POTASSIUM 25 MG/1
TABLET ORAL
Qty: 30 TABLET | Refills: 0 | Status: SHIPPED | OUTPATIENT
Start: 2019-11-06 | End: 2020-05-13

## 2019-11-06 NOTE — TELEPHONE ENCOUNTER
Prescription approved per [facility] [place].    Nishi Dc, RN, BSN, PHN  [facility]: [place]

## 2020-03-01 ENCOUNTER — HEALTH MAINTENANCE LETTER (OUTPATIENT)
Age: 60
End: 2020-03-01

## 2020-05-11 DIAGNOSIS — E11.9 TYPE 2 DIABETES MELLITUS WITHOUT COMPLICATION, WITHOUT LONG-TERM CURRENT USE OF INSULIN (H): ICD-10-CM

## 2020-05-13 RX ORDER — METFORMIN HCL 500 MG
TABLET, EXTENDED RELEASE 24 HR ORAL
Qty: 90 TABLET | Refills: 0 | Status: SHIPPED | OUTPATIENT
Start: 2020-05-13 | End: 2020-11-27

## 2020-05-13 RX ORDER — ASPIRIN 81 MG/1
TABLET, COATED ORAL
Qty: 30 TABLET | Refills: 0 | Status: SHIPPED | OUTPATIENT
Start: 2020-05-13 | End: 2020-09-18

## 2020-05-13 RX ORDER — LOSARTAN POTASSIUM 25 MG/1
TABLET ORAL
Qty: 30 TABLET | Refills: 0 | Status: SHIPPED | OUTPATIENT
Start: 2020-05-13 | End: 2020-09-21

## 2020-06-05 ENCOUNTER — OFFICE VISIT (OUTPATIENT)
Dept: FAMILY MEDICINE | Facility: CLINIC | Age: 60
End: 2020-06-05
Payer: COMMERCIAL

## 2020-06-05 VITALS
DIASTOLIC BLOOD PRESSURE: 66 MMHG | HEART RATE: 71 BPM | TEMPERATURE: 98.2 F | SYSTOLIC BLOOD PRESSURE: 101 MMHG | BODY MASS INDEX: 27.01 KG/M2 | OXYGEN SATURATION: 97 % | WEIGHT: 136 LBS

## 2020-06-05 DIAGNOSIS — E78.5 HYPERLIPIDEMIA LDL GOAL <100: ICD-10-CM

## 2020-06-05 DIAGNOSIS — J45.20 MILD INTERMITTENT ASTHMA WITHOUT COMPLICATION: ICD-10-CM

## 2020-06-05 DIAGNOSIS — Z79.82 ASPIRIN LONG-TERM USE: ICD-10-CM

## 2020-06-05 DIAGNOSIS — M72.2 PLANTAR FASCIITIS: ICD-10-CM

## 2020-06-05 DIAGNOSIS — H61.20 IMPACTED CERUMEN, UNSPECIFIED LATERALITY: ICD-10-CM

## 2020-06-05 DIAGNOSIS — E11.9 TYPE 2 DIABETES MELLITUS WITHOUT COMPLICATION, WITHOUT LONG-TERM CURRENT USE OF INSULIN (H): Primary | Chronic | ICD-10-CM

## 2020-06-05 LAB
ANION GAP SERPL CALCULATED.3IONS-SCNC: 9 MMOL/L (ref 3–14)
BUN SERPL-MCNC: 17 MG/DL (ref 7–30)
CALCIUM SERPL-MCNC: 9 MG/DL (ref 8.5–10.1)
CHLORIDE SERPL-SCNC: 104 MMOL/L (ref 94–109)
CHOLEST SERPL-MCNC: 159 MG/DL
CO2 SERPL-SCNC: 25 MMOL/L (ref 20–32)
CREAT SERPL-MCNC: 0.57 MG/DL (ref 0.52–1.04)
ERYTHROCYTE [DISTWIDTH] IN BLOOD BY AUTOMATED COUNT: 12.5 % (ref 10–15)
GFR SERPL CREATININE-BSD FRML MDRD: >90 ML/MIN/{1.73_M2}
GLUCOSE SERPL-MCNC: 179 MG/DL (ref 70–99)
HBA1C MFR BLD: 6.8 % (ref 0–5.6)
HCT VFR BLD AUTO: 39.9 % (ref 35–47)
HDLC SERPL-MCNC: 46 MG/DL
HGB BLD-MCNC: 12.9 G/DL (ref 11.7–15.7)
LDLC SERPL CALC-MCNC: 77 MG/DL
MCH RBC QN AUTO: 29.8 PG (ref 26.5–33)
MCHC RBC AUTO-ENTMCNC: 32.3 G/DL (ref 31.5–36.5)
MCV RBC AUTO: 92 FL (ref 78–100)
NONHDLC SERPL-MCNC: 113 MG/DL
PLATELET # BLD AUTO: 373 10E9/L (ref 150–450)
POTASSIUM SERPL-SCNC: 4.2 MMOL/L (ref 3.4–5.3)
RBC # BLD AUTO: 4.33 10E12/L (ref 3.8–5.2)
SODIUM SERPL-SCNC: 138 MMOL/L (ref 133–144)
TRIGL SERPL-MCNC: 180 MG/DL
VIT B12 SERPL-MCNC: 458 PG/ML (ref 193–986)
WBC # BLD AUTO: 6.4 10E9/L (ref 4–11)

## 2020-06-05 PROCEDURE — 83036 HEMOGLOBIN GLYCOSYLATED A1C: CPT | Performed by: INTERNAL MEDICINE

## 2020-06-05 PROCEDURE — 80048 BASIC METABOLIC PNL TOTAL CA: CPT | Performed by: INTERNAL MEDICINE

## 2020-06-05 PROCEDURE — 36415 COLL VENOUS BLD VENIPUNCTURE: CPT | Performed by: INTERNAL MEDICINE

## 2020-06-05 PROCEDURE — 99214 OFFICE O/P EST MOD 30 MIN: CPT | Performed by: INTERNAL MEDICINE

## 2020-06-05 PROCEDURE — 85027 COMPLETE CBC AUTOMATED: CPT | Performed by: INTERNAL MEDICINE

## 2020-06-05 PROCEDURE — 80061 LIPID PANEL: CPT | Performed by: INTERNAL MEDICINE

## 2020-06-05 PROCEDURE — 82607 VITAMIN B-12: CPT | Performed by: INTERNAL MEDICINE

## 2020-06-05 NOTE — PROGRESS NOTES
Subjective     Karyn Ly is a 59 year old female who presents to clinic today for the following health issues:    60 y/o F here for  follow up.  Feels her ear is plugged.   H/o DMII Mild Intermittent asthma & hyperlipidemia.   Here from [facility] since . Has 2 children ('83 & ) who still live in [facility]... ...      She was caregiver/supporter for parents each day:  Father  early this year of dementia and mother  of COVID 19 a month later.   She is coping fairly well, but feels sad at times...  Her support is good, she has sisters in the area..     Last labs a year ago ......         HPI   Diabetes Follow-up    How often are you checking your blood sugar? One time daily  What time of day are you checking your blood sugars (select all that apply)?  Before meals  Have you had any blood sugars above 200?  No  Have you had any blood sugars below 70?  No    What symptoms do you notice when your blood sugar is low?  None and hunger    What concerns do you have today about your diabetes? None     Do you have any of these symptoms? (Select all that apply)  No numbness or tingling in feet.  No redness, sores or blisters on feet.  No complaints of excessive thirst.  No reports of blurry vision.  No significant changes to weight.    Have you had a diabetic eye exam in the last 12 months? No        BP Readings from Last 2 Encounters:   20 101/66   19 106/70     Hemoglobin A1C (%)   Date Value   2019 6.8 (H)   2018 6.6 (H)     LDL Cholesterol Calculated (mg/dL)   Date Value   2018 97   11/10/2017 81           How many servings of fruits and vegetables do you eat daily?  2-3    On average, how many sweetened beverages do you drink each day (Examples: soda, juice, sweet tea, etc.  Do NOT count diet or artificially sweetened beverages)?   0    How many days per week do you exercise enough to make your heart beat faster? 7    How many minutes a day do you exercise enough  to make your heart beat faster? 30 - 60  How many days per week do you miss taking your medication? 1    What makes it hard for you to take your medications?  remembering to take         Patient Active Problem List   Diagnosis     Hyperlipidemia LDL goal <100     Mild intermittent asthma     Type 2 diabetes mellitus without complication, without long-term current use of insulin (H)     Epiretinal membrane, mild, od > os     Shellfish allergy     Combined forms of age-related cataract, mild, of both eyes     Past Surgical History:   Procedure Laterality Date     COLONOSCOPY  2/17/2014    Procedure: COLONOSCOPY;  colon-screening / vandana;  Surgeon: Donovan Dinero MD;  Location:  OR       Social History     Tobacco Use     Smoking status: Never Smoker     Smokeless tobacco: Never Used   Substance Use Topics     Alcohol use: Yes     Alcohol/week: 0.0 standard drinks     Comment: occasional      Family History   Problem Relation Age of Onset     Hypertension Mother      Diabetes Father      Glaucoma Father      Lipids Brother      Macular Degeneration No family hx of          Current Outpatient Medications   Medication Sig Dispense Refill     albuterol (PROAIR HFA, PROVENTIL HFA, VENTOLIN HFA) 108 (90 BASE) MCG/ACT inhaler Inhale 2 puffs into the lungs every 6 hours as needed for shortness of breath / dyspnea or wheezing 3 Inhaler 1     ASPIRIN LOW DOSE 81 MG EC tablet TAKE 1 TABLET BY MOUTH ONCE DAILY 30 tablet 0     atorvastatin (LIPITOR) 20 MG tablet TAKE ONE TABLET BY MOUTH ONE TIME DAILY. 90 tablet 1     blood glucose (NO BRAND SPECIFIED) test strip Use to test blood sugar 1 times daily or as directed. 100 strip 1     blood glucose monitoring (NO BRAND SPECIFIED) meter device kit Use to test blood sugar 1 times daily or as directed. Blood Glucose Monitor Brands: per insurance. 1 kit 0     EPINEPHrine (EPIPEN/ADRENACLICK/OR ANY BX GENERIC EQUIV) 0.3 MG/0.3ML injection 2-pack Inject 0.3 mLs (0.3 mg)  into the muscle once as needed for anaphylaxis 0.6 mL 0     losartan (COZAAR) 25 MG tablet TAKE 1/2 TABLET BY MOUTH ONCE DAILY. 30 tablet 0     metFORMIN (GLUCOPHAGE-XR) 500 MG 24 hr tablet TAKE 1 TABLET BY MOUTH ONCE DAILY WITH DINNER 90 tablet 0     MICROLET LANCETS MISC 1 each daily 100 each 3     fluticasone (FLONASE) 50 MCG/ACT nasal spray Spray 1-2 sprays into both nostrils daily (Patient not taking: Reported on 6/5/2020) 16 g 6     Allergies   Allergen Reactions     Shellfish-Derived Products Anaphylaxis     Ace Inhibitors Cough     Recent Labs   Lab Test 06/03/19  0942 12/06/18  0735 08/20/18  0651 05/22/18  0658 11/10/17  0729 08/15/17  0654  08/12/14  0737   A1C 6.8* 6.6* 6.6* 7.1* 6.4* 6.5*   < >  --    LDL  --  97  --   --  81 189*  --   --    HDL  --  48*  --   --  44* 40*  --   --    TRIG  --  126  --   --  128 193*  --   --    ALT  --   --   --   --  27  --   --   --    CR 0.69  --   --  0.80  --  0.80  --   --    GFRESTIMATED >90  --   --  74  --  74  --   --    GFRESTBLACK >90  --   --  89  --  90  --   --    POTASSIUM 4.2  --   --  4.3  --  3.9  --   --    TSH  --   --   --   --   --  1.34  --  1.21    < > = values in this interval not displayed.      BP Readings from Last 3 Encounters:   06/05/20 101/66   07/08/19 106/70   12/06/18 124/79    Wt Readings from Last 3 Encounters:   06/05/20 61.7 kg (136 lb)   07/08/19 63 kg (139 lb)   12/06/18 61.7 kg (136 lb)                    Reviewed and updated as needed this visit by Provider         Review of Systems   Constitutional, HEENT, cardiovascular, pulmonary, gi and gu systems are negative, except as otherwise noted.      Objective    /66 (BP Location: Left arm, Patient Position: Sitting, Cuff Size: Adult Regular)   Pulse 71   Temp 98.2  F (36.8  C) (Oral)   Wt 61.7 kg (136 lb)   SpO2 97%   BMI 27.01 kg/m    Body mass index is 27.01 kg/m .  Physical Exam   GENERAL: healthy, alert and no distress  MS: no gross musculoskeletal defects noted,  no edema  SKIN: no suspicious lesions or rashes  NEURO: Normal strength and tone, mentation intact and speech normal  Diabetic foot exam: normal DP and PT pulses, no trophic changes or ulcerative lesions and normal sensory exam    Pain to deep palpation at heels.     Diagnostic Test Results:  Labs reviewed in Epic  No results found for this or any previous visit (from the past 24 hour(s)).      See orders, labs are pending.     Assessment & Plan       ICD-10-CM    1. Type 2 diabetes mellitus without complication, without long-term current use of insulin (H)  E11.9 Hemoglobin A1c     Basic metabolic panel     Vitamin B12     Albumin Random Urine Quantitative with Creat Ratio     CANCELED: Albumin Random Urine Quantitative with Creat Ratio   2. Mild intermittent asthma without complication  J45.20    3. Hyperlipidemia LDL goal <100  E78.5 Lipid panel reflex to direct LDL Fasting   4. Impacted cerumen, unspecified laterality  H61.20 REMOVE IMPACTED CERUMEN   5. Aspirin long-term use  Z79.82 CBC with platelets   6. Plantar fasciitis  M72.2 Orthopedic & Spine  Referral          Patient Instructions   Shoes with good arch supports.   (ALDEN?)   Try shoe store such as [employer].     Elevate heels at bed time so they don't touch mattress.     Podiatry  will call you;  Put them on your calendar in a month or so.   Bring all of your current foot wear to the appointment.    Return in about 3 months (around 9/5/2020) for Physical Exam, diabetes follow up.    Talia Soto MD  [facility]

## 2020-06-05 NOTE — PATIENT INSTRUCTIONS
Shoes with good arch supports.   (ALDEN?)   Try shoe store such as [employer].     Elevate heels at bed time so they don't touch mattress.     Podiatry  will call you;  Put them on your calendar in a month or so.   Bring all of your current foot wear to the appointment.

## 2020-06-06 ASSESSMENT — ASTHMA QUESTIONNAIRES: ACT_TOTALSCORE: 25

## 2020-06-08 NOTE — RESULT ENCOUNTER NOTE
Your labs are stable.   Dr. Soto will contact you if she would like to make any changes to your medications. At this time there are no changes.   Yana Ovalle PA-C

## 2020-06-08 NOTE — RESULT ENCOUNTER NOTE
Karyn    I think your labs in general look great.  The HgbA1C is at goal, so what you are doing is working well.   Vitamin B12 level is normal.  Cholesterol is generally well controlled.    Blood counts are normal.     I will look forward to seeing you for your physical with labs in about 3 months or so     I hope your feet are getting better.     Sincerely,     RUFINO SMITH M.D.

## 2020-07-23 ENCOUNTER — OFFICE VISIT (OUTPATIENT)
Dept: OPHTHALMOLOGY | Facility: CLINIC | Age: 60
End: 2020-07-23
Payer: COMMERCIAL

## 2020-07-23 DIAGNOSIS — Z01.00 EXAMINATION OF EYES AND VISION: ICD-10-CM

## 2020-07-23 DIAGNOSIS — H52.4 PRESBYOPIA: ICD-10-CM

## 2020-07-23 DIAGNOSIS — E11.9 TYPE 2 DIABETES MELLITUS WITHOUT COMPLICATION, WITHOUT LONG-TERM CURRENT USE OF INSULIN (H): Primary | Chronic | ICD-10-CM

## 2020-07-23 DIAGNOSIS — H25.813 COMBINED FORMS OF AGE-RELATED CATARACT OF BOTH EYES: ICD-10-CM

## 2020-07-23 DIAGNOSIS — H35.373 EPIRETINAL MEMBRANE (ERM) OF BOTH EYES: ICD-10-CM

## 2020-07-23 PROCEDURE — 92014 COMPRE OPH EXAM EST PT 1/>: CPT | Performed by: OPHTHALMOLOGY

## 2020-07-23 PROCEDURE — 92015 DETERMINE REFRACTIVE STATE: CPT | Performed by: OPHTHALMOLOGY

## 2020-07-23 ASSESSMENT — SLIT LAMP EXAM - LIDS
COMMENTS: 2+ DERMATOCHALASIS
COMMENTS: 2+ DERMATOCHALASIS

## 2020-07-23 ASSESSMENT — VISUAL ACUITY
OD_SC+: -2
OD_SC: 20/30
OS_SC+: -1
OS_SC: 20/25
METHOD: SNELLEN - LINEAR

## 2020-07-23 ASSESSMENT — REFRACTION_MANIFEST
OD_CYLINDER: +0.50
OD_AXIS: 010
OD_ADD: +2.50
OD_SPHERE: PLANO
OS_ADD: +2.50
OS_AXIS: 005
OS_SPHERE: -0.50
OS_CYLINDER: +1.25

## 2020-07-23 ASSESSMENT — CUP TO DISC RATIO
OS_RATIO: 0.4
OD_RATIO: 0.3

## 2020-07-23 ASSESSMENT — CONF VISUAL FIELD
OS_NORMAL: 1
OD_NORMAL: 1
METHOD: COUNTING FINGERS

## 2020-07-23 ASSESSMENT — TONOMETRY
OS_IOP_MMHG: 16
OD_IOP_MMHG: 14
IOP_METHOD: APPLANATION

## 2020-07-23 ASSESSMENT — EXTERNAL EXAM - LEFT EYE: OS_EXAM: NORMAL

## 2020-07-23 ASSESSMENT — EXTERNAL EXAM - RIGHT EYE: OD_EXAM: NORMAL

## 2020-07-23 NOTE — PATIENT INSTRUCTIONS
Glasses Rx given - optional  Use artificial tears up to 4 times daily both eyes. (Refresh Tears, Systane Ultra/Balance, or Theratears)   Possible posterior vitreous detachment (sudden onset large floater and/or flashing lights) both eyes discussed.  Call in March 2021 for an appointment in July 2021 for Complete Exam    Dr. Lou [phone]    Patient Education   Diabetes weakens the blood vessels all over the body, including the eyes. Damage to the blood vessels in the eyes can cause swelling or bleeding into part of the eye (called the retina). This is called diabetic retinopathy ([facility]-tin[facility]puh-thee). If not treated, this disease can cause vision loss or blindness.   Symptoms may include blurred or distorted vision, but many people have no symptoms. It's important to see your eye doctor regularly to check for problems.   Early treatment and good control can help protect your vision. Here are the things you can do to help prevent vision loss:      1. Keep your blood sugar levels under tight control.      2. Bring high blood pressure under control.      3. No smoking.      4. Have yearly dilated eye exams.

## 2020-07-23 NOTE — LETTER
7/23/2020         RE: Karyn Ly  [address] [facility] [id number]        Dear Colleague,    Thank you for referring your patient, Karyn Ly, to the [facility]. Please see a copy of my visit note below.     Current Eye Medications:  None     Subjective:  Diabetic eye exam. Vision is doing OK both eyes in distance. Uses OTC readers (doesn't know strength and didn't bring with) for near and sometimes it is looks cloudy. Takes glasses off and closes eyes for a second, then it is OK. Sometimes just avoids reading. No eye pain or discomfort in either eye.   Diabetic for maybe 2 years. BS is stable.  Lab Results   Component Value Date    A1C 6.8 06/05/2020    A1C 6.8 06/03/2019    A1C 6.6 12/06/2018    A1C 6.6 08/20/2018    A1C 7.1 05/22/2018        Objective:  See Ophthalmology Exam.       Assessment:  Stable eye exam.  No diabetic retinopathy.      ICD-10-CM    1. Type 2 diabetes mellitus without complication, without long-term current use of insulin (H)  E11.9    2. Combined forms of age-related cataract, mild, of both eyes  H25.813    3. Epiretinal membrane, mild, od > os  H35.373    4. Examination of eyes and vision  Z01.00    5. Presbyopia  H52.4 REFRACTION     EYE EXAM (SIMPLE-NONBILLABLE)        Plan:  Glasses Rx given - optional  Use artificial tears up to 4 times daily both eyes. (Refresh Tears, Systane Ultra/Balance, or Theratears)   Possible posterior vitreous detachment (sudden onset large floater and/or flashing lights) both eyes discussed.  Call in March 2021 for an appointment in July 2021 for Complete Exam    Dr. Lou [phone]           Again, thank you for allowing me to participate in the care of your patient.        Sincerely,        Montana Lou MD

## 2020-07-23 NOTE — PROGRESS NOTES
Current Eye Medications:  None     Subjective:  Diabetic eye exam. Vision is doing OK both eyes in distance. Uses OTC readers (doesn't know strength and didn't bring with) for near and sometimes it is looks cloudy. Takes glasses off and closes eyes for a second, then it is OK. Sometimes just avoids reading. No eye pain or discomfort in either eye.   Diabetic for maybe 2 years. BS is stable.  Lab Results   Component Value Date    A1C 6.8 06/05/2020    A1C 6.8 06/03/2019    A1C 6.6 12/06/2018    A1C 6.6 08/20/2018    A1C 7.1 05/22/2018        Objective:  See Ophthalmology Exam.       Assessment:  Stable eye exam.  No diabetic retinopathy.      ICD-10-CM    1. Type 2 diabetes mellitus without complication, without long-term current use of insulin (H)  E11.9    2. Combined forms of age-related cataract, mild, of both eyes  H25.813    3. Epiretinal membrane, mild, od > os  H35.373    4. Examination of eyes and vision  Z01.00    5. Presbyopia  H52.4 REFRACTION     EYE EXAM (SIMPLE-NONBILLABLE)        Plan:  Glasses Rx given - optional  Use artificial tears up to 4 times daily both eyes. (Refresh Tears, Systane Ultra/Balance, or Theratears)   Possible posterior vitreous detachment (sudden onset large floater and/or flashing lights) both eyes discussed.  Call in March 2021 for an appointment in July 2021 for Complete Exam    Dr. Lou [phone]

## 2020-09-03 ENCOUNTER — APPOINTMENT (OUTPATIENT)
Dept: OPTOMETRY | Facility: CLINIC | Age: 60
End: 2020-09-03
Payer: MEDICAID

## 2020-09-03 PROCEDURE — 92340 FIT SPECTACLES MONOFOCAL: CPT | Performed by: OPTOMETRIST

## 2020-09-15 DIAGNOSIS — E11.9 TYPE 2 DIABETES MELLITUS WITHOUT COMPLICATION, WITHOUT LONG-TERM CURRENT USE OF INSULIN (H): ICD-10-CM

## 2020-09-16 RX ORDER — LANCETS
EACH MISCELLANEOUS
Qty: 100 EACH | Refills: 0 | Status: SHIPPED | OUTPATIENT
Start: 2020-09-16 | End: 2022-03-17

## 2020-09-16 NOTE — TELEPHONE ENCOUNTER
"Requested Prescriptions   Pending Prescriptions Disp Refills     CONTOUR NEXT TEST test strip [Pharmacy Med Name: Contour Next Test In Vitro Strip] 100 each 0     Sig: USE TO TEST BLOOD SUGAR 1 TIME DAILY OR AS DIRECTED.       Diabetic Supplies Protocol Passed - 9/15/2020  5:08 PM        Passed - Medication is active on med list        Passed - Patient is 18 years of age or older        Passed - Recent (6 mo) or future (30 days) visit within the authorizing provider's specialty     Patient had office visit in the last 6 months or has a visit in the next 30 days with authorizing provider.  See \"Patient Info\" tab in inbasket, or \"Choose Columns\" in Meds & Orders section of the refill encounter.               Microlet Lancets MISC [Pharmacy Med Name: Microlet Lancets Miscellaneous] 100 each 0     Sig: USE ONE DAILY       Diabetic Supplies Protocol Passed - 9/15/2020  5:08 PM        Passed - Medication is active on med list        Passed - Patient is 18 years of age or older        Passed - Recent (6 mo) or future (30 days) visit within the authorizing provider's specialty     Patient had office visit in the last 6 months or has a visit in the next 30 days with authorizing provider.  See \"Patient Info\" tab in inbasket, or \"Choose Columns\" in Meds & Orders section of the refill encounter.               Plan:    I will look forward to seeing you for your physical with labs in about 3 months or so.    Patient has not followed up. Last OV 6/5/20.    Prescription approved x 1 per [facility] Refill Protocol.    Please call patient to schedule appointment.    Cherie Gonzales,RN,BSN  [facility]      "

## 2020-09-16 NOTE — TELEPHONE ENCOUNTER
Sent pt a mychart message to schedule an appointment and about medication approval.      Toni Barraza

## 2020-09-18 DIAGNOSIS — E11.9 TYPE 2 DIABETES MELLITUS WITHOUT COMPLICATION, WITHOUT LONG-TERM CURRENT USE OF INSULIN (H): ICD-10-CM

## 2020-09-21 DIAGNOSIS — E11.9 TYPE 2 DIABETES MELLITUS WITHOUT COMPLICATION, WITHOUT LONG-TERM CURRENT USE OF INSULIN (H): ICD-10-CM

## 2020-09-21 NOTE — TELEPHONE ENCOUNTER
Routing refill request to provider for review/approval because:  Will not RN fill per protocol. PA needed, and no payer selected?      Flor Sweeney RN

## 2020-09-23 RX ORDER — LOSARTAN POTASSIUM 25 MG/1
TABLET ORAL
Qty: 30 TABLET | Refills: 0 | Status: SHIPPED | OUTPATIENT
Start: 2020-09-23 | End: 2020-11-02

## 2020-09-23 NOTE — TELEPHONE ENCOUNTER
Prescription approved per [facility] Refill Protocol.    Nishi Dc, RN, BSN, PHN  [facility]: [place]

## 2020-09-24 ENCOUNTER — ANCILLARY PROCEDURE (OUTPATIENT)
Dept: MAMMOGRAPHY | Facility: CLINIC | Age: 60
End: 2020-09-24
Payer: MEDICAID

## 2020-09-24 DIAGNOSIS — Z12.31 VISIT FOR SCREENING MAMMOGRAM: ICD-10-CM

## 2020-09-24 PROCEDURE — 77067 SCR MAMMO BI INCL CAD: CPT | Mod: TC

## 2020-12-14 ENCOUNTER — HEALTH MAINTENANCE LETTER (OUTPATIENT)
Age: 60
End: 2020-12-14

## 2021-01-06 ENCOUNTER — DOCUMENTATION ONLY (OUTPATIENT)
Dept: LAB | Facility: CLINIC | Age: 61
End: 2021-01-06

## 2021-01-06 NOTE — PROGRESS NOTES
This patient has a future lab only appointment on 2021 and needs orders. Her others are . 'thanks katie

## 2021-01-08 DIAGNOSIS — E78.5 HYPERLIPIDEMIA LDL GOAL <100: ICD-10-CM

## 2021-01-08 DIAGNOSIS — E11.9 TYPE 2 DIABETES MELLITUS WITHOUT COMPLICATION, WITHOUT LONG-TERM CURRENT USE OF INSULIN (H): ICD-10-CM

## 2021-01-08 LAB
CREAT UR-MCNC: 155 MG/DL
HBA1C MFR BLD: 7.3 % (ref 0–5.6)
MICROALBUMIN UR-MCNC: 11 MG/L
MICROALBUMIN/CREAT UR: 6.97 MG/G CR (ref 0–25)

## 2021-01-08 PROCEDURE — 80061 LIPID PANEL: CPT | Performed by: INTERNAL MEDICINE

## 2021-01-08 PROCEDURE — 36415 COLL VENOUS BLD VENIPUNCTURE: CPT | Performed by: INTERNAL MEDICINE

## 2021-01-08 PROCEDURE — 82043 UR ALBUMIN QUANTITATIVE: CPT | Performed by: INTERNAL MEDICINE

## 2021-01-08 PROCEDURE — 83036 HEMOGLOBIN GLYCOSYLATED A1C: CPT | Performed by: INTERNAL MEDICINE

## 2021-01-08 PROCEDURE — 80048 BASIC METABOLIC PNL TOTAL CA: CPT | Performed by: INTERNAL MEDICINE

## 2021-01-08 PROCEDURE — 84443 ASSAY THYROID STIM HORMONE: CPT | Performed by: INTERNAL MEDICINE

## 2021-01-09 LAB
ANION GAP SERPL CALCULATED.3IONS-SCNC: 1 MMOL/L (ref 3–14)
BUN SERPL-MCNC: 16 MG/DL (ref 7–30)
CALCIUM SERPL-MCNC: 9 MG/DL (ref 8.5–10.1)
CHLORIDE SERPL-SCNC: 109 MMOL/L (ref 94–109)
CHOLEST SERPL-MCNC: 163 MG/DL
CO2 SERPL-SCNC: 28 MMOL/L (ref 20–32)
CREAT SERPL-MCNC: 0.72 MG/DL (ref 0.52–1.04)
GFR SERPL CREATININE-BSD FRML MDRD: >90 ML/MIN/{1.73_M2}
GLUCOSE SERPL-MCNC: 130 MG/DL (ref 70–99)
HDLC SERPL-MCNC: 47 MG/DL
LDLC SERPL CALC-MCNC: 95 MG/DL
NONHDLC SERPL-MCNC: 116 MG/DL
POTASSIUM SERPL-SCNC: 4.2 MMOL/L (ref 3.4–5.3)
SODIUM SERPL-SCNC: 138 MMOL/L (ref 133–144)
TRIGL SERPL-MCNC: 103 MG/DL
TSH SERPL DL<=0.005 MIU/L-ACNC: 0.74 MU/L (ref 0.4–4)

## 2021-01-10 NOTE — RESULT ENCOUNTER NOTE
Oxana Mariano    Labs are basically within normal limits.  The HgbA1C has increased some, but your sugars are likely still well controlled in general.  At our next appointment we could talk about increasing the Metformin medication a little.     I look forward to seeing you soon!    Sincerely,       RUFINO SMITH M.D.

## 2021-01-14 ENCOUNTER — TRANSFERRED RECORDS (OUTPATIENT)
Dept: HEALTH INFORMATION MANAGEMENT | Facility: CLINIC | Age: 61
End: 2021-01-14

## 2021-01-29 ENCOUNTER — OFFICE VISIT (OUTPATIENT)
Dept: INTERNAL MEDICINE | Facility: CLINIC | Age: 61
End: 2021-01-29
Payer: COMMERCIAL

## 2021-01-29 VITALS
SYSTOLIC BLOOD PRESSURE: 123 MMHG | OXYGEN SATURATION: 97 % | BODY MASS INDEX: 26.7 KG/M2 | DIASTOLIC BLOOD PRESSURE: 75 MMHG | HEIGHT: 60 IN | HEART RATE: 95 BPM | TEMPERATURE: 98.9 F | WEIGHT: 136 LBS

## 2021-01-29 DIAGNOSIS — E11.9 TYPE 2 DIABETES MELLITUS WITHOUT COMPLICATION, WITHOUT LONG-TERM CURRENT USE OF INSULIN (H): Primary | Chronic | ICD-10-CM

## 2021-01-29 DIAGNOSIS — Z91.013 SHELLFISH ALLERGY: ICD-10-CM

## 2021-01-29 DIAGNOSIS — Z12.4 SCREENING FOR MALIGNANT NEOPLASM OF CERVIX: ICD-10-CM

## 2021-01-29 DIAGNOSIS — J45.20 MILD INTERMITTENT ASTHMA WITHOUT COMPLICATION: ICD-10-CM

## 2021-01-29 PROCEDURE — 87624 HPV HI-RISK TYP POOLED RSLT: CPT | Performed by: INTERNAL MEDICINE

## 2021-01-29 PROCEDURE — 99214 OFFICE O/P EST MOD 30 MIN: CPT | Performed by: INTERNAL MEDICINE

## 2021-01-29 PROCEDURE — G0145 SCR C/V CYTO,THINLAYER,RESCR: HCPCS | Performed by: INTERNAL MEDICINE

## 2021-01-29 RX ORDER — EPINEPHRINE 0.3 MG/.3ML
0.3 INJECTION SUBCUTANEOUS
Qty: 0.6 ML | Refills: 0 | Status: SHIPPED | OUTPATIENT
Start: 2021-01-29 | End: 2023-08-02

## 2021-01-29 RX ORDER — METFORMIN HCL 500 MG
TABLET, EXTENDED RELEASE 24 HR ORAL
Qty: 180 TABLET | Refills: 3 | Status: SHIPPED | OUTPATIENT
Start: 2021-01-29 | End: 2021-09-24

## 2021-01-29 ASSESSMENT — MIFFLIN-ST. JEOR: SCORE: 1105.45

## 2021-01-29 NOTE — PROGRESS NOTES
"  Assessment & Plan     Type 2 diabetes mellitus without complication, without long-term current use of insulin (H)  Will increase metformin to TWO per day.  Increase activity a little each month.   - Hemoglobin A1c; Future  - metFORMIN (GLUCOPHAGE-XR) 500 MG 24 hr tablet; TAKE 2 TABLET BY MOUTH ONCE DAILY WITH DINNER    Mild intermittent asthma without complication     - Asthma Action Plan (AAP)    Shellfish allergy  Refill epi pen  - EPINEPHrine (ANY BX GENERIC EQUIV) 0.3 MG/0.3ML injection 2-pack; Inject 0.3 mLs (0.3 mg) into the muscle once as needed for anaphylaxis    Screening for malignant neoplasm of cervix  Done today with excellent view of cervix.   - Pap imaged thin layer screen with HPV - recommended age 30 - 65 years (select HPV order below)  - HPV High Risk Types DNA Cervical     30 minutes spent on the date of the encounter doing chart review, review of outside records, review of test results, interpretation of tests, patient visit and documentation           BMI:   Estimated body mass index is 27.01 kg/m  as calculated from the following:    Height as of this encounter: 1.511 m (4' 11.5\").    Weight as of this encounter: 61.7 kg (136 lb).   Weight management plan: increase activity           Return in about 3 months (around 4/29/2021) for Physical Exam, diabetes follow up.    Talia Soto MD  [facility]     Karyn Hairston is a 60 year old who presents to clinic today for the following health issues     HPI     61 y/o F here for DM check.  A1C 2W ago 7.3.  H/o  DMII Mild Intermittent asthma & hyperlipidemia.   Here from [facility] since 2009. Has 2 children ('83 & '84) who still live in [facility]... ...         Diabetes Follow-up    How often are you checking your blood sugar? Two times daily  Blood sugar testing frequency justification:  High A1C  What time of day are you checking your blood sugars (select all that apply)?  Before meals  Have you had any " "blood sugars above 200?  No  Have you had any blood sugars below 70?  Yes 68    What symptoms do you notice when your blood sugar is low?  Shaky    What concerns do you have today about your diabetes? Other: High A1C     Do you have any of these symptoms? (Select all that apply)  No numbness or tingling in feet.  No redness, sores or blisters on feet.  No complaints of excessive thirst.  No reports of blurry vision.  No significant changes to weight.      BP Readings from Last 2 Encounters:   01/29/21 123/75   06/05/20 101/66     Hemoglobin A1C (%)   Date Value   01/08/2021 7.3 (H)   06/05/2020 6.8 (H)     LDL Cholesterol Calculated (mg/dL)   Date Value   01/08/2021 95   06/05/2020 77            How many servings of fruits and vegetables do you eat daily?  0-1    On average, how many sweetened beverages do you drink each day (Examples: soda, juice, sweet tea, etc.  Do NOT count diet or artificially sweetened beverages)?   0    How many days per week do you exercise enough to make your heart beat faster? 3 or less    How many minutes a day do you exercise enough to make your heart beat faster? 9 or less    How many days per week do you miss taking your medication? 0        Review of Systems   Constitutional, HEENT, cardiovascular, pulmonary, gi and gu systems are negative, except as otherwise noted.    Had some small skin cancers removed via [facility].   Reviewed their documentation and will place to abstraction.       Objective    /75 (BP Location: Right arm, Patient Position: Sitting, Cuff Size: Adult Regular)   Pulse 95   Temp 98.9  F (37.2  C) (Oral)   Ht 1.511 m (4' 11.5\")   Wt 61.7 kg (136 lb)   SpO2 97%   BMI 27.01 kg/m    Body mass index is 27.01 kg/m .  Physical Exam   GENERAL: healthy, alert and no distress  EYES: Eyes grossly normal to inspection, PERRL and conjunctivae and sclerae normal   (female): normal female external genitalia, normal urethral meatus, vaginal mucosa, normal " cervix/adnexa/uterus without masses or discharge   (female): PAP done with excellent view of cervix.   MS: no gross musculoskeletal defects noted, no edema  NEURO: Normal strength and tone, mentation intact and speech normal  PSYCH: mentation appears normal, affect normal/bright    A1C recently increased from 6.x to 7.x

## 2021-01-29 NOTE — PATIENT INSTRUCTIONS
"Return to clinic 3 months for diabetes recheck with lab prior        Check on shingles vaccine (\"Shingrix\") coverage    Increase metformin to TWO tabs daily     See if you can increase your walk by one or two blocks.... increase by 6 blocks by spring time 2021  "

## 2021-01-29 NOTE — LETTER
My Asthma Action Plan    Name: Karyn Ly   YOB: 1960  Date: 1/29/2021   My doctor: Talia Soto MD   My clinic: [facility]        My Rescue Medicine:   Albuterol inhaler (Proair/Ventolin/Proventil HFA)  2-4 puffs EVERY 4 HOURS as needed. Use a spacer if recommended by your provider.   My Asthma Severity:   Intermittent / Exercise Induced  Know your asthma triggers: upper respiratory infections             GREEN ZONE   Good Control    I feel good    No cough or wheeze    Can work, sleep and play without asthma symptoms       Take your asthma control medicine every day.     1. If exercise triggers your asthma, take your rescue medication    15 minutes before exercise or sports, and    During exercise if you have asthma symptoms  2. Spacer to use with inhaler: If you have a spacer, make sure to use it with your inhaler             YELLOW ZONE Getting Worse  I have ANY of these:    I do not feel good    Cough or wheeze    Chest feels tight    Wake up at night   1. Keep taking your Green Zone medications  2. Start taking your rescue medicine:    every 20 minutes for up to 1 hour. Then every 4 hours for 24-48 hours.  3. If you stay in the Yellow Zone for more than 12-24 hours, contact your doctor.  4. If you do not return to the Green Zone in 12-24 hours or you get worse, start taking your oral steroid medicine if prescribed by your provider.           RED ZONE Medical Alert - Get Help  I have ANY of these:    I feel awful    Medicine is not helping    Breathing getting harder    Trouble walking or talking    Nose opens wide to breathe       1. Take your rescue medicine NOW  2. If your provider has prescribed an oral steroid medicine, start taking it NOW  3. Call your doctor NOW  4. If you are still in the Red Zone after 20 minutes and you have not reached your doctor:    Take your rescue medicine again and    Call 911 or go to the emergency room right away    See  your regular doctor within 2 weeks of an Emergency Room or Urgent Care visit for follow-up treatment.          Annual Reminders:  Meet with Asthma Educator,  Flu Shot in the Fall, consider Pneumonia Vaccination for patients with asthma (aged 19 and older).    Pharmacy:    [id number] - [place], [place] - [address] [id number] - . [place], [place] - [address] [facility]  [id number] - [place], [place] - [address] AT [address] OF [facility]    Electronically signed by Talia Soto MD   Date: 01/29/21                    Asthma Triggers  How To Control Things That Make Your Asthma Worse    Triggers are things that make your asthma worse.  Look at the list below to help you find your triggers and   what you can do about them. You can help prevent asthma flare-ups by staying away from your triggers.      Trigger                                                          What you can do   Cigarette Smoke  Tobacco smoke can make asthma worse. Do not allow smoking in your home, car or around you.  Be sure no one smokes at a child s day care or school.  If you smoke, ask your health care provider for ways to help you quit.  Ask family members to quit too.  Ask your health care provider for a referral to Quit Plan to help you quit smoking, or call 5-058-101-PLAN.     Colds, Flu, Bronchitis  These are common triggers of asthma. Wash your hands often.  Don t touch your eyes, nose or mouth.  Get a flu shot every year.     Dust Mites  These are tiny bugs that live in cloth or carpet. They are too small to see. Wash sheets and blankets in hot water every week.   Encase pillows and mattress in dust mite proof covers.  Avoid having carpet if you can. If you have carpet, vacuum weekly.   Use a dust mask and HEPA vacuum.   Pollen and Outdoor Mold  Some people are allergic to trees, grass, or weed pollen, or molds. Try to keep your windows closed.  Limit time out doors when pollen count is high.   Ask  you health care provider about taking medicine during allergy season.     Animal Dander  Some people are allergic to skin flakes, urine or saliva from pets with fur or feathers. Keep pets with fur or feathers out of your home.    If you can t keep the pet outdoors, then keep the pet out of your bedroom.  Keep the bedroom door closed.  Keep pets off cloth furniture and away from stuffed toys.     Mice, Rats, and Cockroaches  Some people are allergic to the waste from these pests.   Cover food and garbage.  Clean up spills and food crumbs.  Store grease in the refrigerator.   Keep food out of the bedroom.   Indoor Mold  This can be a trigger if your home has high moisture. Fix leaking faucets, pipes, or other sources of water.   Clean moldy surfaces.  Dehumidify basement if it is damp and smelly.   Smoke, Strong Odors, and Sprays  These can reduce air quality. Stay away from strong odors and sprays, such as perfume, powder, hair spray, paints, smoke incense, paint, cleaning products, candles and new carpet.   Exercise or Sports  Some people with asthma have this trigger. Be active!  Ask your doctor about taking medicine before sports or exercise to prevent symptoms.    Warm up for 5-10 minutes before and after sports or exercise.     Other Triggers of Asthma  Cold air:  Cover your nose and mouth with a scarf.  Sometimes laughing or crying can be a trigger.  Some medicines and food can trigger asthma.

## 2021-01-30 ASSESSMENT — ASTHMA QUESTIONNAIRES: ACT_TOTALSCORE: 25

## 2021-02-01 ENCOUNTER — TELEPHONE (OUTPATIENT)
Dept: FAMILY MEDICINE | Facility: CLINIC | Age: 61
End: 2021-02-01

## 2021-02-01 NOTE — TELEPHONE ENCOUNTER
PA is needed for the medication, Epinephrine 0.3 MG/0.3ML  Would you like to start PA or Rx alternative medication? If PA, please list all medications this patient has tried along with any contraindications that may have been experienced in the past. Thank you.    [facility]: [facility]  [phone]  Phone:     Insurance Plan: Blue Plus  Phone:   Pt ID: [id number]   Group:     Forwarded to SOFIA PIRES  for review.  Started in covermymeds  Key-[id number]

## 2021-02-02 NOTE — TELEPHONE ENCOUNTER
Central Prior Authorization Team   Phone: [phone]    PA Initiation    Medication: Epinephrine 0.3MG/0.3ML  Insurance Company: [employer] [place] - Phone [phone] Fax [phone]  Pharmacy Filling the Rx: [facility] [address] [place] [address]  Filling Pharmacy Phone: [phone]  Filling Pharmacy Fax: [phone]  Start Date: 2/2/2021

## 2021-02-03 LAB
COPATH REPORT: NORMAL
PAP: NORMAL

## 2021-02-03 NOTE — TELEPHONE ENCOUNTER
SPOKE WITH PHARMACIST, ADVISED HIM PATIENT'S  INSURANCE WILL COVER EPINEPHRINE MADE BY [employer].  PHARMACIST PROCESSED AND WILL NOTIFY PATIENT WHEN READY.      PRIOR AUTHORIZATION DENIED    Medication: Epinephrine 0.3MG/0.3ML-DENIED    Denial Date: 2/2/2021    Denial Rational: PATIENT MUST TRY/FAIL FORMULARY ALTERNATIVE - [employer].      Appeal Information:  IF PATIENT IS UNABLE TO TRY/FAIL ALTERNATIVE(S) PLEASE SUPPLY PA TEAM WITH A LETTER OF MEDICAL NECESSITY WITH CLINICAL REASON.

## 2021-02-04 LAB
FINAL DIAGNOSIS: NORMAL
HPV HR 12 DNA CVX QL NAA+PROBE: NEGATIVE
HPV16 DNA SPEC QL NAA+PROBE: NEGATIVE
HPV18 DNA SPEC QL NAA+PROBE: NEGATIVE
SPECIMEN DESCRIPTION: NORMAL
SPECIMEN SOURCE CVX/VAG CYTO: NORMAL

## 2021-04-17 ENCOUNTER — HEALTH MAINTENANCE LETTER (OUTPATIENT)
Age: 61
End: 2021-04-17

## 2021-06-07 DIAGNOSIS — E11.9 TYPE 2 DIABETES MELLITUS WITHOUT COMPLICATION, WITHOUT LONG-TERM CURRENT USE OF INSULIN (H): ICD-10-CM

## 2021-06-08 RX ORDER — LOSARTAN POTASSIUM 25 MG/1
TABLET ORAL
Qty: 45 TABLET | Refills: 1 | Status: SHIPPED | OUTPATIENT
Start: 2021-06-08 | End: 2021-11-02

## 2021-08-07 ENCOUNTER — HEALTH MAINTENANCE LETTER (OUTPATIENT)
Age: 61
End: 2021-08-07

## 2021-09-07 ENCOUNTER — LAB (OUTPATIENT)
Dept: LAB | Facility: CLINIC | Age: 61
End: 2021-09-07
Payer: COMMERCIAL

## 2021-09-07 DIAGNOSIS — E11.9 TYPE 2 DIABETES MELLITUS WITHOUT COMPLICATION, WITHOUT LONG-TERM CURRENT USE OF INSULIN (H): Chronic | ICD-10-CM

## 2021-09-07 LAB — HBA1C MFR BLD: 6.6 % (ref 0–5.6)

## 2021-09-07 PROCEDURE — 36415 COLL VENOUS BLD VENIPUNCTURE: CPT

## 2021-09-07 PROCEDURE — 83036 HEMOGLOBIN GLYCOSYLATED A1C: CPT

## 2021-09-07 NOTE — RESULT ENCOUNTER NOTE
Karyn Ly    The Hgb A1c is at goal: Great diabetes control.     Sincerely,       RUFINO SMITH M.D.

## 2021-09-24 ENCOUNTER — OFFICE VISIT (OUTPATIENT)
Dept: INTERNAL MEDICINE | Facility: CLINIC | Age: 61
End: 2021-09-24
Payer: COMMERCIAL

## 2021-09-24 VITALS
OXYGEN SATURATION: 99 % | HEART RATE: 69 BPM | DIASTOLIC BLOOD PRESSURE: 83 MMHG | TEMPERATURE: 98 F | WEIGHT: 135 LBS | SYSTOLIC BLOOD PRESSURE: 127 MMHG | BODY MASS INDEX: 26.81 KG/M2

## 2021-09-24 DIAGNOSIS — E11.9 TYPE 2 DIABETES MELLITUS WITHOUT COMPLICATION, WITHOUT LONG-TERM CURRENT USE OF INSULIN (H): Primary | ICD-10-CM

## 2021-09-24 DIAGNOSIS — E78.5 HYPERLIPIDEMIA LDL GOAL <100: ICD-10-CM

## 2021-09-24 DIAGNOSIS — J45.20 MILD INTERMITTENT ASTHMA WITHOUT COMPLICATION: ICD-10-CM

## 2021-09-24 DIAGNOSIS — R06.83 SNORING: ICD-10-CM

## 2021-09-24 DIAGNOSIS — Z23 NEED FOR PROPHYLACTIC VACCINATION AND INOCULATION AGAINST INFLUENZA: ICD-10-CM

## 2021-09-24 PROCEDURE — 90471 IMMUNIZATION ADMIN: CPT | Performed by: INTERNAL MEDICINE

## 2021-09-24 PROCEDURE — 99214 OFFICE O/P EST MOD 30 MIN: CPT | Mod: 25 | Performed by: INTERNAL MEDICINE

## 2021-09-24 PROCEDURE — 90682 RIV4 VACC RECOMBINANT DNA IM: CPT | Performed by: INTERNAL MEDICINE

## 2021-09-24 RX ORDER — ALBUTEROL SULFATE 90 UG/1
2 AEROSOL, METERED RESPIRATORY (INHALATION) EVERY 6 HOURS PRN
Qty: 18 G | Refills: 11 | Status: SHIPPED | OUTPATIENT
Start: 2021-09-24 | End: 2023-04-21

## 2021-09-24 RX ORDER — METFORMIN HCL 500 MG
TABLET, EXTENDED RELEASE 24 HR ORAL
Qty: 180 TABLET | Refills: 3 | Status: SHIPPED | OUTPATIENT
Start: 2021-09-24 | End: 2022-07-25

## 2021-09-24 ASSESSMENT — ANXIETY QUESTIONNAIRES
6. BECOMING EASILY ANNOYED OR IRRITABLE: NOT AT ALL
5. BEING SO RESTLESS THAT IT IS HARD TO SIT STILL: NOT AT ALL
1. FEELING NERVOUS, ANXIOUS, OR ON EDGE: NOT AT ALL
IF YOU CHECKED OFF ANY PROBLEMS ON THIS QUESTIONNAIRE, HOW DIFFICULT HAVE THESE PROBLEMS MADE IT FOR YOU TO DO YOUR WORK, TAKE CARE OF THINGS AT HOME, OR GET ALONG WITH OTHER PEOPLE: NOT DIFFICULT AT ALL
2. NOT BEING ABLE TO STOP OR CONTROL WORRYING: NOT AT ALL
7. FEELING AFRAID AS IF SOMETHING AWFUL MIGHT HAPPEN: MORE THAN HALF THE DAYS
GAD7 TOTAL SCORE: 2
3. WORRYING TOO MUCH ABOUT DIFFERENT THINGS: NOT AT ALL

## 2021-09-24 ASSESSMENT — PATIENT HEALTH QUESTIONNAIRE - PHQ9: 5. POOR APPETITE OR OVEREATING: NOT AT ALL

## 2021-09-24 NOTE — PATIENT INSTRUCTIONS
Call to schedule Sleep Evaluation:       Sleep Clinic   [address] [address]   Phone: [phone]         Return to clinic January or February for annual physical.   Fasting labs prior

## 2021-09-24 NOTE — PROGRESS NOTES
"    Assessment & Plan     Type 2 diabetes mellitus without complication, without long-term current use of insulin (H)  Well controlled .     - blood glucose (CONTOUR NEXT TEST) test strip; 1 strip by In Vitro route daily  - metFORMIN (GLUCOPHAGE-XR) 500 MG 24 hr tablet; TAKE 2 TABLET BY MOUTH ONCE DAILY WITH DINNER  - Hemoglobin A1c; Future  - Basic metabolic panel; Future    Mild intermittent asthma without complication   controlled  - albuterol (PROAIR HFA/PROVENTIL HFA/VENTOLIN HFA) 108 (90 Base) MCG/ACT inhaler; Inhale 2 puffs into the lungs every 6 hours as needed for shortness of breath / dyspnea or wheezing    Snoring  Discussed sleep study//KASSIDY at length   She is agreeable.    - SLEEP EVALUATION & MANAGEMENT REFERRAL - ADULT -; Future    Hyperlipidemia LDL goal <100     - Lipid panel reflex to direct LDL Fasting; Future      I spent a total of 20 minutes on the day of the visit.   Time spent doing chart review, history and exam, documentation and further activities per the note        BMI:   Estimated body mass index is 26.81 kg/m  as calculated from the following:    Height as of 1/29/21: 1.511 m (4' 11.5\").    Weight as of this encounter: 61.2 kg (135 lb).   Weight management plan: she is slowly and consistenlty losing weight.          Return in about 4 months (around 1/24/2022) for Physical Exam, Lab Work.    Talia Soto MD  [facility]   Karyn Hairston is a 61 year old who presents for the following health issues     HPI    62 y/o F here for DM and sleep concerns:  H/o  DMII Mild Intermittent asthma & hyperlipidemia.    .  A1C 6.6, two weeks ago.     Has been told she snores loudly and seems to stop breathing.  Has headache when awakens.  Not always refreshed upon awakening.    She did prop up  Her pillow and will awaken at night because she has a hard time keeping the position.      Diabetes Follow-up    How often are you checking your blood sugar? One time " daily  What time of day are you checking your blood sugars (select all that apply)?  Before meals  Have you had any blood sugars above 200?  No  Have you had any blood sugars below 70?  Yes 68    What symptoms do you notice when your blood sugar is low?  Shaky    What concerns do you have today about your diabetes? None     Do you have any of these symptoms? (Select all that apply)  No numbness or tingling in feet.  No redness, sores or blisters on feet.  No complaints of excessive thirst.  No reports of blurry vision.  No significant changes to weight.    Have you had a diabetic eye exam in the last 12 months? No-has appointment made        BP Readings from Last 2 Encounters:   09/24/21 127/83   01/29/21 123/75     Hemoglobin A1C (%)   Date Value   09/07/2021 6.6 (H)   01/08/2021 7.3 (H)   06/05/2020 6.8 (H)     LDL Cholesterol Calculated (mg/dL)   Date Value   01/08/2021 95   06/05/2020 77           How many servings of fruits and vegetables do you eat daily?  0-3    On average, how many sweetened beverages do you drink each day (Examples: soda, juice, sweet tea, etc.  Do NOT count diet or artificially sweetened beverages)?   0    How many days per week do you exercise enough to make your heart beat faster? 7    How many minutes a day do you exercise enough to make your heart beat faster? 30 - 60  How many days per week do you miss taking your medication? Occasionally misses the lipitor at night     What makes it hard for you to take your medications?  remembering to take    Discuss snoring and waking up with a headache.      Has EYE appt next week.      Review of Systems   Constitutional, HEENT, cardiovascular, pulmonary, gi and gu systems are negative, except as otherwise noted.      Objective    /83 (BP Location: Right arm, Patient Position: Sitting, Cuff Size: Adult Regular)   Pulse 69   Temp 98  F (36.7  C) (Oral)   Wt 61.2 kg (135 lb)   SpO2 99%   BMI 26.81 kg/m    Body mass index is 26.81  kg/m .  Physical Exam   GENERAL: healthy, alert and no distress  EYES: Eyes grossly normal to inspection, PERRL and conjunctivae and sclerae normal  MS: no gross musculoskeletal defects noted, no edema  SKIN: no suspicious lesions or rashes  NEURO: Normal strength and tone, mentation intact and speech normal  PSYCH: mentation appears normal, affect normal/bright  Diabetic foot exam: normal DP and PT pulses, no trophic changes or ulcerative lesions and normal sensory exam    Lab on 09/07/2021   Component Date Value Ref Range Status     Hemoglobin A1C 09/07/2021 6.6* 0.0 - 5.6 % Final    Normal <5.7%   Prediabetes 5.7-6.4%    Diabetes 6.5% or higher     Note: Adopted from ADA consensus guidelines.

## 2021-09-25 ASSESSMENT — ANXIETY QUESTIONNAIRES: GAD7 TOTAL SCORE: 2

## 2021-09-30 ENCOUNTER — OFFICE VISIT (OUTPATIENT)
Dept: OPHTHALMOLOGY | Facility: CLINIC | Age: 61
End: 2021-09-30
Payer: COMMERCIAL

## 2021-09-30 DIAGNOSIS — E11.9 TYPE 2 DIABETES MELLITUS WITHOUT COMPLICATION, WITHOUT LONG-TERM CURRENT USE OF INSULIN (H): Primary | Chronic | ICD-10-CM

## 2021-09-30 DIAGNOSIS — H52.4 PRESBYOPIA: ICD-10-CM

## 2021-09-30 DIAGNOSIS — H25.813 COMBINED FORMS OF AGE-RELATED CATARACT OF BOTH EYES: ICD-10-CM

## 2021-09-30 DIAGNOSIS — Z01.01 ENCOUNTER FOR EXAMINATION OF EYES AND VISION WITH ABNORMAL FINDINGS: ICD-10-CM

## 2021-09-30 DIAGNOSIS — H35.373 EPIRETINAL MEMBRANE (ERM) OF BOTH EYES: ICD-10-CM

## 2021-09-30 PROCEDURE — 92015 DETERMINE REFRACTIVE STATE: CPT | Performed by: OPHTHALMOLOGY

## 2021-09-30 PROCEDURE — 92014 COMPRE OPH EXAM EST PT 1/>: CPT | Performed by: OPHTHALMOLOGY

## 2021-09-30 ASSESSMENT — EXTERNAL EXAM - LEFT EYE: OS_EXAM: NORMAL

## 2021-09-30 ASSESSMENT — REFRACTION_MANIFEST
OS_SPHERE: -0.75
OD_SPHERE: PLANO
OD_ADD: +2.50
OS_AXIS: 005
OD_CYLINDER: +0.75
OS_CYLINDER: +1.25
OS_ADD: +2.50
OD_AXIS: 008

## 2021-09-30 ASSESSMENT — VISUAL ACUITY
OS_SC: 20/25
OS_SC+: -1
OD_SC: 20/25
METHOD: SNELLEN - LINEAR
OD_SC+: -2

## 2021-09-30 ASSESSMENT — SLIT LAMP EXAM - LIDS
COMMENTS: 2+ DERMATOCHALASIS
COMMENTS: 2+ DERMATOCHALASIS

## 2021-09-30 ASSESSMENT — CUP TO DISC RATIO
OD_RATIO: 0.3
OS_RATIO: 0.4

## 2021-09-30 ASSESSMENT — EXTERNAL EXAM - RIGHT EYE: OD_EXAM: NORMAL

## 2021-09-30 ASSESSMENT — CONF VISUAL FIELD
OD_NORMAL: 1
OS_NORMAL: 1

## 2021-09-30 ASSESSMENT — TONOMETRY
IOP_METHOD: APPLANATION
OS_IOP_MMHG: 18
OD_IOP_MMHG: 18

## 2021-09-30 NOTE — PATIENT INSTRUCTIONS
Glasses Rx given - optional   Possible posterior vitreous detachment (sudden onset large floater and/or flashing lights) both eyes discussed.   May use artificial tears up to 4 times daily both eyes. (Refresh Tears, Systane Ultra/Balance, or Theratears)   Call in May 2022 for an appointment in September 2022 for Complete Exam    Dr. Lou [phone]    Patient Education   Diabetes weakens the blood vessels all over the body, including the eyes. Damage to the blood vessels in the eyes can cause swelling or bleeding into part of the eye (called the retina). This is called diabetic retinopathy ([facility]). If not treated, this disease can cause vision loss or blindness.   Symptoms may include blurred or distorted vision, but many people have no symptoms. It's important to see your eye doctor regularly to check for problems.   Early treatment and good control can help protect your vision. Here are the things you can do to help prevent vision loss:      1. Keep your blood sugar levels under tight control.      2. Bring high blood pressure under control.      3. No smoking.      4. Have yearly dilated eye exams.       
98.4

## 2021-09-30 NOTE — PROGRESS NOTES
Current Eye Medications:  None     Subjective:  Here for Diabetic Eye Exam. Pt complains of intermittent blurry vision at distance and near. Most problematic at near with and without glasses on. No eye pain or discomfort.     Lab Results   Component Value Date    A1C 6.6 09/07/2021    A1C 7.3 01/08/2021    A1C 6.8 06/05/2020    A1C 6.8 06/03/2019    A1C 6.6 12/06/2018    A1C 6.6 08/20/2018        Objective:  See Ophthalmology Exam.       Assessment:  Stable mild cataract and epiretinal membrane both eyes.  No diabetic retinopathy.      ICD-10-CM    1. Type 2 diabetes mellitus without complication, without long-term current use of insulin (H)  E11.9 EYE EXAM (SIMPLE-NONBILLABLE)   2. Combined forms of age-related cataract, mild, of both eyes  H25.813    3. Epiretinal membrane, mild, od > os  H35.373    4. Encounter for examination of eyes and vision with abnormal findings  Z01.01    5. Presbyopia  H52.4 REFRACTION        Plan:  Glasses Rx given - optional   Possible posterior vitreous detachment (sudden onset large floater and/or flashing lights) both eyes discussed.   May use artificial tears up to 4 times daily both eyes. (Refresh Tears, Systane Ultra/Balance, or Theratears)   Call in May 2022 for an appointment in September 2022 for Complete Exam    Dr. Lou [phone]

## 2021-09-30 NOTE — LETTER
9/30/2021         RE: Karyn Ly  [address] [facility] [address]        Dear Colleague,    Thank you for referring your patient, Karyn Ly, to the [facility]. Please see a copy of my visit note below.     Current Eye Medications:  None     Subjective:  Here for Diabetic Eye Exam. Pt complains of intermittent blurry vision at distance and near. Most problematic at near with and without glasses on. No eye pain or discomfort.     Lab Results   Component Value Date    A1C 6.6 09/07/2021    A1C 7.3 01/08/2021    A1C 6.8 06/05/2020    A1C 6.8 06/03/2019    A1C 6.6 12/06/2018    A1C 6.6 08/20/2018        Objective:  See Ophthalmology Exam.       Assessment:  Stable mild cataract and epiretinal membrane both eyes.  No diabetic retinopathy.      ICD-10-CM    1. Type 2 diabetes mellitus without complication, without long-term current use of insulin (H)  E11.9 EYE EXAM (SIMPLE-NONBILLABLE)   2. Combined forms of age-related cataract, mild, of both eyes  H25.813    3. Epiretinal membrane, mild, od > os  H35.373    4. Encounter for examination of eyes and vision with abnormal findings  Z01.01    5. Presbyopia  H52.4 REFRACTION        Plan:  Glasses Rx given - optional   Possible posterior vitreous detachment (sudden onset large floater and/or flashing lights) both eyes discussed.   May use artificial tears up to 4 times daily both eyes. (Refresh Tears, Systane Ultra/Balance, or Theratears)   Call in May 2022 for an appointment in September 2022 for Complete Exam    Dr. Lou [phone]             Again, thank you for allowing me to participate in the care of your patient.        Sincerely,        [place] Lou MD

## 2021-11-01 DIAGNOSIS — E11.9 TYPE 2 DIABETES MELLITUS WITHOUT COMPLICATION, WITHOUT LONG-TERM CURRENT USE OF INSULIN (H): ICD-10-CM

## 2021-11-02 RX ORDER — ASPIRIN 81 MG/1
TABLET, DELAYED RELEASE ORAL
Qty: 90 TABLET | Refills: 3 | Status: SHIPPED | OUTPATIENT
Start: 2021-11-02 | End: 2023-04-21

## 2021-11-02 RX ORDER — LOSARTAN POTASSIUM 25 MG/1
TABLET ORAL
Qty: 45 TABLET | Refills: 3 | Status: SHIPPED | OUTPATIENT
Start: 2021-11-02 | End: 2022-07-25

## 2021-11-14 ASSESSMENT — ENCOUNTER SYMPTOMS
COUGH DISTURBING SLEEP: 0
FLANK PAIN: 0
STIFFNESS: 0
SHORTNESS OF BREATH: 0
EXERCISE INTOLERANCE: 0
MYALGIAS: 0
WHEEZING: 1
NECK PAIN: 1
LIGHT-HEADEDNESS: 1
HYPERTENSION: 0
SMELL DISTURBANCE: 0
SORE THROAT: 0
COUGH: 0
SINUS CONGESTION: 0
TASTE DISTURBANCE: 0
SLEEP DISTURBANCES DUE TO BREATHING: 0
JOINT SWELLING: 0
PALPITATIONS: 0
ORTHOPNEA: 1
MUSCLE WEAKNESS: 0
SYNCOPE: 0
HEMOPTYSIS: 0
BACK PAIN: 1
SINUS PAIN: 0
NECK MASS: 0
DIFFICULTY URINATING: 0
SNORES LOUDLY: 1
LEG PAIN: 1
HYPOTENSION: 0
MUSCLE CRAMPS: 0
DYSURIA: 0
ARTHRALGIAS: 0
HOARSE VOICE: 0
POSTURAL DYSPNEA: 1
DYSPNEA ON EXERTION: 0
TROUBLE SWALLOWING: 0
SPUTUM PRODUCTION: 1
HEMATURIA: 0

## 2021-11-14 ASSESSMENT — SLEEP AND FATIGUE QUESTIONNAIRES
HOW LIKELY ARE YOU TO NOD OFF OR FALL ASLEEP WHILE SITTING INACTIVE IN A PUBLIC PLACE: WOULD NEVER DOZE
HOW LIKELY ARE YOU TO NOD OFF OR FALL ASLEEP IN A CAR, WHILE STOPPED FOR A FEW MINUTES IN TRAFFIC: WOULD NEVER DOZE
HOW LIKELY ARE YOU TO NOD OFF OR FALL ASLEEP WHILE SITTING AND READING: MODERATE CHANCE OF DOZING
HOW LIKELY ARE YOU TO NOD OFF OR FALL ASLEEP WHILE WATCHING TV: MODERATE CHANCE OF DOZING
HOW LIKELY ARE YOU TO NOD OFF OR FALL ASLEEP WHEN YOU ARE A PASSENGER IN A CAR FOR AN HOUR WITHOUT A BREAK: MODERATE CHANCE OF DOZING
HOW LIKELY ARE YOU TO NOD OFF OR FALL ASLEEP WHILE LYING DOWN TO REST IN THE AFTERNOON WHEN CIRCUMSTANCES PERMIT: MODERATE CHANCE OF DOZING
HOW LIKELY ARE YOU TO NOD OFF OR FALL ASLEEP WHILE SITTING QUIETLY AFTER LUNCH WITHOUT ALCOHOL: SLIGHT CHANCE OF DOZING
HOW LIKELY ARE YOU TO NOD OFF OR FALL ASLEEP WHILE SITTING AND TALKING TO SOMEONE: WOULD NEVER DOZE

## 2021-11-15 ENCOUNTER — ANCILLARY PROCEDURE (OUTPATIENT)
Dept: MAMMOGRAPHY | Facility: CLINIC | Age: 61
End: 2021-11-15
Attending: INTERNAL MEDICINE
Payer: COMMERCIAL

## 2021-11-15 DIAGNOSIS — Z12.31 VISIT FOR SCREENING MAMMOGRAM: ICD-10-CM

## 2021-11-15 PROCEDURE — 77067 SCR MAMMO BI INCL CAD: CPT | Mod: TC | Performed by: RADIOLOGY

## 2021-11-18 ENCOUNTER — VIRTUAL VISIT (OUTPATIENT)
Dept: SLEEP MEDICINE | Facility: CLINIC | Age: 61
End: 2021-11-18
Payer: COMMERCIAL

## 2021-11-18 DIAGNOSIS — R51.9 SLEEP RELATED HEADACHES: ICD-10-CM

## 2021-11-18 DIAGNOSIS — R06.81 WITNESSED EPISODE OF APNEA: Primary | ICD-10-CM

## 2021-11-18 DIAGNOSIS — R06.83 SNORING: ICD-10-CM

## 2021-11-18 DIAGNOSIS — G47.10 HYPERSOMNIA: ICD-10-CM

## 2021-11-18 PROCEDURE — 99203 OFFICE O/P NEW LOW 30 MIN: CPT | Mod: 95 | Performed by: INTERNAL MEDICINE

## 2021-11-18 NOTE — PROGRESS NOTES
"The patient has been notified of following:     \"This video visit will be conducted via a call between you and your physician/provider. We have found that certain health care needs can be provided without the need for an in-person physical exam.  This service lets us provide the care you need with a video conversation.  If a prescription is necessary we can send it directly to your pharmacy.  If lab work is needed we can place an order for that and you can then stop by our lab to have the test done at a later time.    Video visits are billed at different rates depending on your insurance coverage. During this emergency period, for some insurers they may be billed the same as an in-person visit.  Please reach out to your insurance provider with any questions.     If during the course of the call the physician/provider feels a video visit is not appropriate, you will not be charged for this service.\"    Patient has given verbal consent to a Video visit? Yes    If the video visit is dropped, the invitation should be resent by: Send to e-mail at: [email or web address]     How would you like to obtain your AVS? MyChart    Video-Visit Details    Type of service:  Video Visit    Video Start Time: 1:35 PM    Video End Time:1:50 PM    Originating Location (pt. Location): Home    Distant Location (provider location):  [facility]     Platform used for Video Visit: [employer]    Answers for HPI/ROS submitted by the patient on 11/14/2021  General Symptoms: No  Skin Symptoms: No  HENT Symptoms: Yes  EYE SYMPTOMS: No  HEART SYMPTOMS: Yes  LUNG SYMPTOMS: Yes  INTESTINAL SYMPTOMS: No  URINARY SYMPTOMS: Yes  GYNECOLOGIC SYMPTOMS: No  BREAST SYMPTOMS: No  SKELETAL SYMPTOMS: Yes  BLOOD SYMPTOMS: No  NERVOUS SYSTEM SYMPTOMS: No  MENTAL HEALTH SYMPTOMS: No  Ear pain: No  Ear discharge: No  Hearing loss: No  Tinnitus: No  Nosebleeds: No  Congestion: No  Sinus pain: No  Trouble swallowing: No   Voice " hoarseness: No  Mouth sores: No  Sore throat: No  Tooth pain: No  Gum tenderness: Yes  Bleeding gums: Yes  Change in taste: No  Change in sense of smell: No  Dry mouth: No  Hearing aid used: No  Neck lump: No  Chest pain or pressure: No  Fast or irregular heartbeat: No  Pain in legs with walking: Yes  Trouble breathing while lying down: Yes  Fingers or toes appear blue: No  High blood pressure: No  Low blood pressure: No  Fainting: No  Murmurs: No  Pacemaker: No  Varicose veins: No  Edema or swelling: No  Wake up at night with shortness of breath: No  Light-headedness: Yes  Exercise intolerance: No  Cough: No  Sputum or phlegm: Yes  Coughing up blood: No  Difficulty breating or shortness of breath: No  Snoring: Yes  Wheezing: Yes  Difficulty breathing on exertion: No  Nighttime Cough: No  Difficulty breathing when lying flat: Yes  Trouble holding urine or incontinence: No  Pain or burning: No  Trouble starting or stopping: No  Increased frequency of urination: Yes  Blood in urine: No  Decreased frequency of urination: No  Frequent nighttime urination: Yes  Flank pain: No  Difficulty emptying bladder: No  Back pain: Yes  Muscle aches: No  Neck pain: Yes  Swollen joints: No  Joint pain: No  Bone pain: No  Muscle cramps: No  Muscle weakness: No  Joint stiffness: No  Bone fracture: No    Additional 15 minutes on the date of service was spent performing the following:    -Preparing to see the patient  -Obtaining and/or reviewing separately obtained history   -Ordering medications, tests, or procedures   -Documenting clinical information in the electronic or other health record     Thank you for the opportunity to participate in the care of  Karyn Ly.    Assessment and Plan:    In summary Karyn Ly is a 61 year old year old female here for sleep disturbance.  1. Witness apnea/Hypersomnia/Snoring/Sleep related headaches   Karyn Ly has high risk for obstructive sleep apnea based on  the history of witness apnea, hypersomnia and a crowded airway. I educated the patient on the underlying pathophysiology of obstructive sleep apnea. We reviewed the risks associated with sleep apnea, including increased cardiovascular risk and overall death. We talked about treatments briefly. I recommend getting an baseline nocturnal polysomnography. The patient should return to the clinic to discuss results and treatment option in a patient-centered approach.      History of present illness:    She is a 61 year old female who comes to the [facility] clinic with a chief complaint of excessive daytime sleepiness that is been going on for more than a year.  She has been told by friends and family members that she has significant pauses in her breathing during sleep followed by loud snoring.  She also frequently wakes up with headaches as well as gasping for breath.     Ideal Sleep-Wake Cycle(devoid of societal pressure):    Patient would try to initiate sleep at around 10:30 PM with a sleep latency of less than 20 minutes. The patient would have 2 awakenings. Final wake up time is around 6-7 AM.    SIDDHARTH:  SIDDHARTH Total Score: 10  Total score - [facility]: 9 (11/14/2021  6:54 PM)    Patient told to return in one week after the sleep study is interpreted.    Patient Active Problem List   Diagnosis     Hyperlipidemia LDL goal <100     Mild intermittent asthma     Type 2 diabetes mellitus without complication, without long-term current use of insulin (H)     Epiretinal membrane, mild, od > os     Shellfish allergy     Combined forms of age-related cataract, mild, of both eyes     Past Medical History:   Diagnosis Date     Asthma      Diabetes (H)      Past Surgical History:   Procedure Laterality Date     COLONOSCOPY  2/17/2014    Procedure: COLONOSCOPY;  colon-screening / vandana;  Surgeon: Donovan Dinero MD;  Location: [facility] OR     Current Outpatient Medications   Medication Sig Dispense Refill     albuterol (PROAIR  HFA/PROVENTIL HFA/VENTOLIN HFA) 108 (90 Base) MCG/ACT inhaler Inhale 2 puffs into the lungs every 6 hours as needed for shortness of breath / dyspnea or wheezing 18 g 11     atorvastatin (LIPITOR) 20 MG tablet TAKE ONE TABLET BY MOUTH ONE TIME DAILY  90 tablet 3     blood glucose (CONTOUR NEXT TEST) test strip 1 strip by In Vitro route daily 100 strip 3     blood glucose monitoring (NO BRAND SPECIFIED) meter device kit Use to test blood sugar 1 times daily or as directed. Blood Glucose Monitor Brands: per insurance. 1 kit 0     EPINEPHrine (ANY BX GENERIC EQUIV) 0.3 MG/0.3ML injection 2-pack Inject 0.3 mLs (0.3 mg) into the muscle once as needed for anaphylaxis 0.6 mL 0     fluticasone (FLONASE) 50 MCG/ACT nasal spray Spray 1-2 sprays into both nostrils daily 16 g 6     losartan (COZAAR) 25 MG tablet TAKE 1/2 TABLET BY MOUTH ONCE DAILY. 45 tablet 3     metFORMIN (GLUCOPHAGE-XR) 500 MG 24 hr tablet TAKE 2 TABLET BY MOUTH ONCE DAILY WITH DINNER 180 tablet 3     Microlet Lancets MISC USE ONE DAILY 100 each 0     SM ASPIRIN ADULT LOW STRENGTH 81 MG EC tablet Take 1 tablet (81 mg) by mouth daily 90 tablet 3     Shellfish-derived products and Ace inhibitors  Social History     Socioeconomic History     Marital status: Single     Spouse name: Not on file     Number of children: Not on file     Years of education: Not on file     Highest education level: Not on file   Occupational History     Not on file   Tobacco Use     Smoking status: Never Smoker     Smokeless tobacco: Never Used   Substance and Sexual Activity     Alcohol use: Yes     Alcohol/week: 0.0 standard drinks     Comment: occasional      Drug use: No     Sexual activity: Not Currently   Other Topics Concern     Parent/sibling w/ CABG, MI or angioplasty before 65F 55M? Not Asked   Social History Narrative    Came to [place] from [facility] 2009.  Lives with sister (Deyanira).  Caregiver for her mother/father who live a few minutes away from her.  Has 2 children  ('83, '84) who still live in [facility] (pt raised them herself:  Marriage was anulled in [place]).       She goes to [facility] every other year to visit her daughters (and has 5 grandchildren there)     Social Determinants of Health     Financial Resource Strain: Not on file   Food Insecurity: Not on file   Transportation Needs: Not on file   Physical Activity: Not on file   Stress: Not on file   Social Connections: Not on file   Intimate Partner Violence: Not on file   Housing Stability: Not on file     Family History   Problem Relation Age of Onset     Hypertension Mother      Diabetes Father      Glaucoma Father      Lipids Brother      Macular Degeneration No family hx of         Physical Exam:  GEN: NAD,   Head: Normocephalic.  EYES: EOMI  ENT: Oropharynx is clear, Moon class 4+ airway.   Psych: normal mood, normal affect  Snore test:(+)     Labs/Studies:     No results found for: PH, PHARTERIAL, PO2, [id number], SAT, PCO2, HCO3, BASEEXCESS, SHERINE, BEB  Lab Results   Component Value Date    TSH 0.74 01/08/2021    TSH 1.34 08/15/2017     Lab Results   Component Value Date     (H) 01/08/2021     (H) 06/05/2020     Lab Results   Component Value Date    HGB 12.9 06/05/2020     Lab Results   Component Value Date    BUN 16 01/08/2021    BUN 17 06/05/2020    CR 0.72 01/08/2021    CR 0.57 06/05/2020     Lab Results   Component Value Date    AST 16 11/10/2017    ALT 27 11/10/2017     No results found for: UAMP, UBARB, BENZODIAZEUR, UCANN, UCOC, OPIT, UPCP    Recent Labs   Lab Test 01/08/21  0905 06/05/20  1110    138   POTASSIUM 4.2 4.2   CHLORIDE 109 104   CO2 28 25   ANIONGAP 1* 9   * 179*   BUN 16 17   CR 0.72 0.57   KELVIN 9.0 9.0       No results found for: MARCO Osei DO  Board Certified in Internal Medicine and Sleep Medicine    (Note created with Dragon voice recognition and unintended spelling errors and word substitutions may occur)

## 2021-11-18 NOTE — PATIENT INSTRUCTIONS
Patient education: What is a sleep study?     What is a sleep study? -- A sleep study is a test that measures how well you sleep and checks for sleep problems. For some sleep studies, you stay overnight in a sleep lab at a hospital or sleep center.     What happens during a sleep study? -- Before you go to sleep, a technician attaches small, sticky patches called  electrodes  to your head, chest, and legs. He or she will also place a small tube beneath your nose and might wrap 1 or 2 belts around your chest.   Each of these items has wires that connect to monitors. The monitors record your movement, brain activity, breathing, and other body functions while you sleep.  If you have a history of trouble falling asleep, your doctor might prescribe a medicine to help you fall asleep in the lab. If you have never taken the medicine before, your doctor might ask you take it on a night before your sleep study to see how it affects you.   Why might my doctor order a sleep study? -- Your doctor will order a sleep study if he or she thinks you have sleep apnea or a different condition that makes you:   ?Have sudden jerking leg movements while you sleep, called  periodic limb movements.    ?Feel very sleepy during the day and fall asleep all of a sudden, called  narcolepsy.    ?Have trouble falling asleep or staying asleep over a long period of time, called  chronic insomnia.    ?Do odd things while you sleep, such as walking.  How should I prepare for a sleep study? -- On the day of your sleep study, you should:   ?Avoid alcohol   ?Avoid drinking coffee, tea, sodas, and other drinks that have caffeine in the afternoon and evening   ?Take all of your regular medicines     The cost of care estimate line is [phone]. They are able to give the patient an estimate of the charges and also an estimate of their insurance coverage/patient responsibility.   After your sleep study is performed, please call us at [phone] or  [phone]  to schedule for a follow up to review the results of the sleep study.    Please bring one tab of low dose melatonin 3 mg or less to the night of the study.    Melatonin intake is completely voluntary.    You may take own melatonin after arrival to sleep center. Do not drive or operate machinery after intake of melatonin.

## 2021-11-19 ENCOUNTER — TELEPHONE (OUTPATIENT)
Dept: SLEEP MEDICINE | Facility: CLINIC | Age: 61
End: 2021-11-19
Payer: COMMERCIAL

## 2021-11-19 NOTE — TELEPHONE ENCOUNTER
Attempted to reach Karyn today to schedule PSG and 2 week follow up visit with Dr. Osei for test results. No answer. [facility]      Federica JEFFRIES CMA, [facility], 11/19/2021 10:33 AM

## 2021-11-19 NOTE — TELEPHONE ENCOUNTER
----- Message from Anita Meeks CMA sent at 11/19/2021  7:39 AM CST -----  Regarding: schedule PSG (oxygen) return visit Dr. Osei  Insurance: [employer] [place]

## 2022-01-21 ENCOUNTER — LAB (OUTPATIENT)
Dept: LAB | Facility: CLINIC | Age: 62
End: 2022-01-21
Payer: COMMERCIAL

## 2022-01-21 DIAGNOSIS — E78.5 HYPERLIPIDEMIA LDL GOAL <100: ICD-10-CM

## 2022-01-21 DIAGNOSIS — E11.9 TYPE 2 DIABETES MELLITUS WITHOUT COMPLICATION, WITHOUT LONG-TERM CURRENT USE OF INSULIN (H): Primary | ICD-10-CM

## 2022-01-21 LAB — HBA1C MFR BLD: 6.9 % (ref 0–5.6)

## 2022-01-21 PROCEDURE — 80048 BASIC METABOLIC PNL TOTAL CA: CPT

## 2022-01-21 PROCEDURE — 82043 UR ALBUMIN QUANTITATIVE: CPT

## 2022-01-21 PROCEDURE — 80061 LIPID PANEL: CPT

## 2022-01-21 PROCEDURE — 36415 COLL VENOUS BLD VENIPUNCTURE: CPT

## 2022-01-21 PROCEDURE — 83036 HEMOGLOBIN GLYCOSYLATED A1C: CPT

## 2022-01-22 LAB
ANION GAP SERPL CALCULATED.3IONS-SCNC: 9 MMOL/L (ref 3–14)
BUN SERPL-MCNC: 13 MG/DL (ref 7–30)
CALCIUM SERPL-MCNC: 9 MG/DL (ref 8.5–10.1)
CHLORIDE BLD-SCNC: 105 MMOL/L (ref 94–109)
CHOLEST SERPL-MCNC: 153 MG/DL
CO2 SERPL-SCNC: 25 MMOL/L (ref 20–32)
CREAT SERPL-MCNC: 0.72 MG/DL (ref 0.52–1.04)
CREAT UR-MCNC: 238 MG/DL
FASTING STATUS PATIENT QL REPORTED: YES
GFR SERPL CREATININE-BSD FRML MDRD: >90 ML/MIN/1.73M2
GLUCOSE BLD-MCNC: 147 MG/DL (ref 70–99)
HDLC SERPL-MCNC: 43 MG/DL
LDLC SERPL CALC-MCNC: 94 MG/DL
MICROALBUMIN UR-MCNC: 14 MG/L
MICROALBUMIN/CREAT UR: 5.88 MG/G CR (ref 0–25)
NONHDLC SERPL-MCNC: 110 MG/DL
POTASSIUM BLD-SCNC: 4.1 MMOL/L (ref 3.4–5.3)
SODIUM SERPL-SCNC: 139 MMOL/L (ref 133–144)
TRIGL SERPL-MCNC: 82 MG/DL

## 2022-01-28 ENCOUNTER — ANCILLARY PROCEDURE (OUTPATIENT)
Dept: GENERAL RADIOLOGY | Facility: CLINIC | Age: 62
End: 2022-01-28
Attending: INTERNAL MEDICINE
Payer: COMMERCIAL

## 2022-01-28 ENCOUNTER — OFFICE VISIT (OUTPATIENT)
Dept: INTERNAL MEDICINE | Facility: CLINIC | Age: 62
End: 2022-01-28
Payer: COMMERCIAL

## 2022-01-28 VITALS
BODY MASS INDEX: 27.29 KG/M2 | TEMPERATURE: 98.4 F | OXYGEN SATURATION: 98 % | DIASTOLIC BLOOD PRESSURE: 64 MMHG | SYSTOLIC BLOOD PRESSURE: 110 MMHG | HEIGHT: 60 IN | WEIGHT: 139 LBS | HEART RATE: 72 BPM

## 2022-01-28 DIAGNOSIS — R06.83 SNORING: ICD-10-CM

## 2022-01-28 DIAGNOSIS — Z00.01 ENCOUNTER FOR GENERAL ADULT MEDICAL EXAMINATION WITH ABNORMAL FINDINGS: Primary | ICD-10-CM

## 2022-01-28 DIAGNOSIS — Z23 NEED FOR SHINGLES VACCINE: ICD-10-CM

## 2022-01-28 DIAGNOSIS — E78.5 HYPERLIPIDEMIA LDL GOAL <100: ICD-10-CM

## 2022-01-28 DIAGNOSIS — M79.651 PAIN OF RIGHT THIGH: ICD-10-CM

## 2022-01-28 DIAGNOSIS — E11.9 TYPE 2 DIABETES MELLITUS WITHOUT COMPLICATION, WITHOUT LONG-TERM CURRENT USE OF INSULIN (H): ICD-10-CM

## 2022-01-28 DIAGNOSIS — J45.20 MILD INTERMITTENT ASTHMA WITHOUT COMPLICATION: ICD-10-CM

## 2022-01-28 DIAGNOSIS — Z00.00 ROUTINE GENERAL MEDICAL EXAMINATION AT A HEALTH CARE FACILITY: ICD-10-CM

## 2022-01-28 PROCEDURE — 99396 PREV VISIT EST AGE 40-64: CPT | Mod: 25 | Performed by: INTERNAL MEDICINE

## 2022-01-28 PROCEDURE — 73502 X-RAY EXAM HIP UNI 2-3 VIEWS: CPT | Mod: RT | Performed by: RADIOLOGY

## 2022-01-28 PROCEDURE — 90750 HZV VACC RECOMBINANT IM: CPT | Performed by: INTERNAL MEDICINE

## 2022-01-28 PROCEDURE — 90471 IMMUNIZATION ADMIN: CPT | Performed by: INTERNAL MEDICINE

## 2022-01-28 PROCEDURE — 99213 OFFICE O/P EST LOW 20 MIN: CPT | Mod: 25 | Performed by: INTERNAL MEDICINE

## 2022-01-28 PROCEDURE — 73552 X-RAY EXAM OF FEMUR 2/>: CPT | Mod: RT | Performed by: RADIOLOGY

## 2022-01-28 RX ORDER — ASPIRIN 81 MG/1
81 TABLET, CHEWABLE ORAL DAILY
Start: 2022-01-28 | End: 2023-04-21

## 2022-01-28 ASSESSMENT — ENCOUNTER SYMPTOMS
EYE PAIN: 0
DIZZINESS: 0
JOINT SWELLING: 0
WEAKNESS: 0
CHILLS: 0
COUGH: 0
CONSTIPATION: 0
HEADACHES: 1
HEARTBURN: 0
BREAST MASS: 0
SHORTNESS OF BREATH: 0
FEVER: 0
HEMATOCHEZIA: 0
ARTHRALGIAS: 0
DIARRHEA: 0
DYSURIA: 0
SORE THROAT: 0
FREQUENCY: 1
NAUSEA: 0
MYALGIAS: 0
HEMATURIA: 0
ABDOMINAL PAIN: 0
PALPITATIONS: 0
PARESTHESIAS: 0
NERVOUS/ANXIOUS: 0

## 2022-01-28 ASSESSMENT — ASTHMA QUESTIONNAIRES
ACT_TOTALSCORE: 25
QUESTION_3 LAST FOUR WEEKS HOW OFTEN DID YOUR ASTHMA SYMPTOMS (WHEEZING, COUGHING, SHORTNESS OF BREATH, CHEST TIGHTNESS OR PAIN) WAKE YOU UP AT NIGHT OR EARLIER THAN USUAL IN THE MORNING: NOT AT ALL
QUESTION_1 LAST FOUR WEEKS HOW MUCH OF THE TIME DID YOUR ASTHMA KEEP YOU FROM GETTING AS MUCH DONE AT WORK, SCHOOL OR AT HOME: NONE OF THE TIME
QUESTION_5 LAST FOUR WEEKS HOW WOULD YOU RATE YOUR ASTHMA CONTROL: COMPLETELY CONTROLLED
QUESTION_2 LAST FOUR WEEKS HOW OFTEN HAVE YOU HAD SHORTNESS OF BREATH: NOT AT ALL
ACT_TOTALSCORE: 25
QUESTION_4 LAST FOUR WEEKS HOW OFTEN HAVE YOU USED YOUR RESCUE INHALER OR NEBULIZER MEDICATION (SUCH AS ALBUTEROL): NOT AT ALL

## 2022-01-28 ASSESSMENT — MIFFLIN-ST. JEOR: SCORE: 1114.06

## 2022-01-28 NOTE — PATIENT INSTRUCTIONS

## 2022-01-28 NOTE — NURSING NOTE
Prior to immunization administration, verified patients identity using patient s name and date of birth. Please see Immunization Activity for additional information.     Screening Questionnaire for Adult Immunization    Are you sick today?   No   Do you have allergies to medications, food, a vaccine component or latex?   Yes   Have you ever had a serious reaction after receiving a vaccination?   No   Do you have a long-term health problem with heart, lung, kidney, or metabolic disease (e.g., diabetes), asthma, a blood disorder, no spleen, complement component deficiency, a cochlear implant, or a spinal fluid leak?  Are you on long-term aspirin therapy?   No   Do you have cancer, leukemia, HIV/AIDS, or any other immune system problem?   No   Do you have a parent, brother, or sister with an immune system problem?   No   In the past 3 months, have you taken medications that affect  your immune system, such as prednisone, other steroids, or anticancer drugs; drugs for the treatment of rheumatoid arthritis, Crohn s disease, or psoriasis; or have you had radiation treatments?   No   Have you had a seizure, or a brain or other nervous system problem?   No   During the past year, have you received a transfusion of blood or blood    products, or been given immune (gamma) globulin or antiviral drug?   No   For women: Are you pregnant or is there a chance you could become       pregnant during the next month?   No   Have you received any vaccinations in the past 4 weeks?   No     Immunization questionnaire was positive for at least one answer.  Notified Dr. Soto.        Per orders of Dr. Soto, injection of Shingrix given by Ginna Lyon CMA. Patient instructed to remain in clinic for 15 minutes afterwards, and to report any adverse reaction to me immediately.       Screening performed by Ginna Lyon CMA on 1/28/2022 at 8:39 AM.

## 2022-01-28 NOTE — PROGRESS NOTES
SUBJECTIVE:   CC: Karyn Ly is an 61 year old woman who presents for preventive health visit.     62 y/o F here for AFE.  H/o DMII Mild Intermittent asthma & hyperlipidemia.  Undergoing sleep study soon due to daytime fatigue and witnessed apnea.   Recent BMP, CHol, A1C, all at goal.        Has had R leg pain since November.  Started without injury.   Deep, dull.  Thigh (anterior). Seems related to  Activity.   Sometimes when she awakens in the morning, the pain is there.   Does have some back pain/stiffness    She had a couple of lesions removed, per [facility].   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)    Healthy Habits:     Getting at least 3 servings of Calcium per day:  Yes    Bi-annual eye exam:  Yes    Dental care twice a year:  Yes    Sleep apnea or symptoms of sleep apnea:  Excessive snoring    Diet:  Diabetic    Frequency of exercise:  2-3 days/week    Duration of exercise:  15-30 minutes    Taking medications regularly:  Yes    Medication side effects:  None    PHQ-2 Total Score: 0    Additional concerns today:  Yes          Right leg pain x 4 months     Today's PHQ-2 Score:   PHQ-2 ( 1999 Pf[employer]) 1/28/2022   Q1: Little interest or pleasure in doing things 0   Q2: Feeling down, depressed or hopeless 0   PHQ-2 Score 0   PHQ-2 Total Score (12-17 Years)- Positive if 3 or more points; Administer PHQ-A if positive -   Q1: Little interest or pleasure in doing things Not at all   Q2: Feeling down, depressed or hopeless Not at all   PHQ-2 Score 0       Abuse: Current or Past (Physical, Sexual or Emotional) - No  Do you feel safe in your environment? Yes        Social History     Tobacco Use     Smoking status: Never Smoker     Smokeless tobacco: Never Used   Substance Use Topics     Alcohol use: Yes     Alcohol/week: 0.0 standard drinks     Comment: occasional      If you drink alcohol do you typically have >3 drinks per day or >7 drinks per week? No    Alcohol Use 1/28/2022   Prescreen:  >3 drinks/day or >7 drinks/week? No   Prescreen: >3 drinks/day or >7 drinks/week? -       Reviewed orders with patient.  Reviewed health maintenance and updated orders accordingly - Yes  Labs reviewed in EPIC  BP Readings from Last 3 Encounters:   01/28/22 110/64   09/24/21 127/83   01/29/21 123/75    Wt Readings from Last 3 Encounters:   01/28/22 63 kg (139 lb)   09/24/21 61.2 kg (135 lb)   01/29/21 61.7 kg (136 lb)                  Patient Active Problem List   Diagnosis     Hyperlipidemia LDL goal <100     Mild intermittent asthma     Type 2 diabetes mellitus without complication, without long-term current use of insulin (H)     Epiretinal membrane, mild, od > os     Shellfish allergy     Combined forms of age-related cataract, mild, of both eyes     Past Surgical History:   Procedure Laterality Date     COLONOSCOPY  2/17/2014    Procedure: COLONOSCOPY;  colon-screening / vandana;  Surgeon: Donovan Dinero MD;  Location:  OR       Social History     Tobacco Use     Smoking status: Never Smoker     Smokeless tobacco: Never Used   Substance Use Topics     Alcohol use: Not Currently     Alcohol/week: 0.0 standard drinks     Comment: occasional      Family History   Problem Relation Age of Onset     Hypertension Mother      Diabetes Father      Glaucoma Father      Snoring Father      Lipids Brother      Macular Degeneration No family hx of          Current Outpatient Medications   Medication Sig Dispense Refill     albuterol (PROAIR HFA/PROVENTIL HFA/VENTOLIN HFA) 108 (90 Base) MCG/ACT inhaler Inhale 2 puffs into the lungs every 6 hours as needed for shortness of breath / dyspnea or wheezing 18 g 11     atorvastatin (LIPITOR) 20 MG tablet TAKE ONE TABLET BY MOUTH ONE TIME DAILY  90 tablet 3     blood glucose (CONTOUR NEXT TEST) test strip 1 strip by In Vitro route daily 100 strip 3     blood glucose monitoring (NO BRAND SPECIFIED) meter device kit Use to test blood sugar 1 times daily or as directed.  Blood Glucose Monitor Brands: per insurance. 1 kit 0     EPINEPHrine (ANY BX GENERIC EQUIV) 0.3 MG/0.3ML injection 2-pack Inject 0.3 mLs (0.3 mg) into the muscle once as needed for anaphylaxis 0.6 mL 0     fluticasone (FLONASE) 50 MCG/ACT nasal spray Spray 1-2 sprays into both nostrils daily 16 g 6     losartan (COZAAR) 25 MG tablet TAKE 1/2 TABLET BY MOUTH ONCE DAILY. 45 tablet 3     metFORMIN (GLUCOPHAGE-XR) 500 MG 24 hr tablet TAKE 2 TABLET BY MOUTH ONCE DAILY WITH DINNER 180 tablet 3     Microlet Lancets MISC USE ONE DAILY 100 each 0     SM ASPIRIN ADULT LOW STRENGTH 81 MG EC tablet Take 1 tablet (81 mg) by mouth daily 90 tablet 3     Allergies   Allergen Reactions     Shellfish-Derived Products Anaphylaxis     Ace Inhibitors Cough     Recent Labs   Lab Test 01/21/22  0756 09/07/21  0724 01/08/21  0905 06/05/20  1110 05/22/18  0658 11/10/17  0729 08/15/17  0654   A1C 6.9* 6.6* 7.3* 6.8*   < > 6.4* 6.5*   LDL 94  --  95 77   < > 81 189*   HDL 43*  --  47* 46*   < > 44* 40*   TRIG 82  --  103 180*   < > 128 193*   ALT  --   --   --   --   --  27  --    CR 0.72  --  0.72 0.57   < >  --  0.80   GFRESTIMATED >90  --  >90 >90   < >  --  74   GFRESTBLACK  --   --  >90 >90   < >  --  90   POTASSIUM 4.1  --  4.2 4.2   < >  --  3.9   TSH  --   --  0.74  --   --   --  1.34    < > = values in this interval not displayed.        Breast Cancer Screening:    Breast CA Risk Assessment (FHS-7) 1/28/2022   Do you have a family history of breast, colon, or ovarian cancer? No / Unknown          Mammogram Screening: Recommended mammography every 1-2 years with patient    Pertinent mammograms are reviewed under the imaging tab.    History of abnormal Pap smear:  no  PAP / HPV Latest Ref Rng & Units 1/29/2021 8/17/2017 2/6/2014   PAP (Historical) - NIL NIL NIL   HPV16 NEG:Negative Negative Negative -   HPV18 NEG:Negative Negative Negative -   HRHPV NEG:Negative Negative Negative -     Reviewed and updated as needed this visit by  "clinical staff                Reviewed and updated as needed this visit by Provider               Past Medical History:   Diagnosis Date     Asthma      Diabetes (H)       Past Surgical History:   Procedure Laterality Date     COLONOSCOPY  2/17/2014    Procedure: COLONOSCOPY;  colon-screening / vandana;  Surgeon: Donovan Dinero MD;  Location:  OR       Review of Systems   Constitutional: Negative for chills and fever.   HENT: Negative for congestion, ear pain, hearing loss and sore throat.    Eyes: Negative for pain and visual disturbance.   Respiratory: Negative for cough and shortness of breath.    Cardiovascular: Negative for chest pain, palpitations and peripheral edema.   Gastrointestinal: Negative for abdominal pain, constipation, diarrhea, heartburn, hematochezia and nausea.   Breasts:  Negative for tenderness, breast mass and discharge.   Genitourinary: Positive for frequency. Negative for dysuria, genital sores, hematuria, pelvic pain, urgency, vaginal bleeding and vaginal discharge.   Musculoskeletal: Negative for arthralgias, joint swelling and myalgias.   Skin: Negative for rash.   Neurological: Positive for headaches. Negative for dizziness, weakness and paresthesias.   Psychiatric/Behavioral: The patient is not nervous/anxious.            OBJECTIVE:   /64 (BP Location: Right arm, Patient Position: Sitting, Cuff Size: Adult Regular)   Pulse 72   Temp 98.4  F (36.9  C) (Oral)   Ht 1.511 m (4' 11.5\")   Wt 63 kg (139 lb)   SpO2 98%   BMI 27.60 kg/m       Physical Exam  GENERAL APPEARANCE: healthy, alert and no distress  EYES: Eyes grossly normal to inspection, PERRL and conjunctivae and sclerae normal  HENT: ear canals and TM's normal, nose and mouth without ulcers or lesions, oropharynx clear and oral mucous membranes moist  NECK: no adenopathy, no asymmetry, masses, or scars and thyroid normal to palpation  RESP: lungs clear to auscultation - no rales, rhonchi or " wheezes  BREAST: normal without masses, tenderness or nipple discharge and no palpable axillary masses or adenopathy  CV: regular rate and rhythm, normal S1 S2, no S3 or S4, no murmur, click or rub, no peripheral edema and peripheral pulses strong  ABDOMEN: soft, nontender, no hepatosplenomegaly, no masses and bowel sounds normal   (female): normal female external genitalia, normal urethral meatus, vaginal mucosal atrophy noted, normal cervix, adnexae, and uterus without masses or abnormal discharge   (female): normal female external genitalia, normal urethral meatus, vaginal mucosal atrophy noted ... inspection only  MS: no musculoskeletal defects are noted and gait is age appropriate without ataxia  MS: mild discomfort with external rotation R leg.  Negative straight leg raise.    SKIN: no suspicious lesions or rashes  SKIN  Scar L flank and low back from squam in situ removals last year   NEURO: Normal strength and tone, sensory exam grossly normal, mentation intact and speech normal  PSYCH: mentation appears normal and affect normal/bright    Diagnostic Test Results:  Labs reviewed in Epic    ASSESSMENT/PLAN:   (Z00.01) Encounter for general adult medical examination with abnormal findings  (primary encounter diagnosis)   (Z00.00) Routine general medical examination at a health care facility    (E11.9) Type 2 diabetes mellitus without complication, without long-term current use of insulin (H)  Comment: A1C is 6.9  Well controlled.  Continue current management   Plan: OFFICE/OUTPT VISIT,EST,LEVL III             (M79.363) Pain of right thigh  Comment:  I think she has mild dejenerative joint arthritis at inferior aspect of R hip radiating to thigh.  Xray femur neg, R hip mild changes at inferior joint, dejenerative joint arthritis at lumbar     Plan: XR Pelvis and Hip Right 1 View, XR Femur Right         2 Views, OFFICE/OUTPT VISIT,EST,LEVL III         (J45.20) Mild intermittent asthma without  "complication  Comment: controlled.    Plan:      (R06.83) Snoring  Comment: upcoming sleep study  Plan:      (E78.5) Hyperlipidemia LDL goal <100  Comment:    Plan:      (Z23) Need for shingles vaccine  Comment:  Agreeable.  She checked w/insurance for coverage.   Plan: ZOSTER VACCINE RECOMBINANT (Shingrix)                   COUNSELING:  Reviewed preventive health counseling, as reflected in patient instructions       Immunizations    Vaccinated for: Zoster          Estimated body mass index is 26.81 kg/m  as calculated from the following:    Height as of 1/29/21: 1.511 m (4' 11.5\").    Weight as of 9/24/21: 61.2 kg (135 lb).        She reports that she has never smoked. She has never used smokeless tobacco.      Counseling Resources:  ATP IV Guidelines  Pooled Cohorts Equation Calculator  Breast Cancer Risk Calculator  BRCA-Related Cancer Risk Assessment: FHS-7 Tool  FRAX Risk Assessment  ICSI Preventive Guidelines  Dietary Guidelines for Americans, 2010  USDA's MyPlate  ASA Prophylaxis  Lung CA Screening    Talia Soto MD  [facility]  "

## 2022-01-28 NOTE — LETTER
My Asthma Action Plan    Name: Karyn Ly   YOB: 1960  Date: 1/28/2022   My doctor: Talia Soto MD   My clinic: [facility]        My Rescue Medicine:   Albuterol inhaler (Proair/Ventolin/Proventil HFA)  2-4 puffs EVERY 4 HOURS as needed. Use a spacer if recommended by your provider.   My Asthma Severity:   Intermittent / Exercise Induced  Know your asthma triggers: upper respiratory infections             GREEN ZONE   Good Control    I feel good    No cough or wheeze    Can work, sleep and play without asthma symptoms       Take your asthma control medicine every day.     1. If exercise triggers your asthma, take your rescue medication    15 minutes before exercise or sports, and    During exercise if you have asthma symptoms  2. Spacer to use with inhaler: If you have a spacer, make sure to use it with your inhaler             YELLOW ZONE Getting Worse  I have ANY of these:    I do not feel good    Cough or wheeze    Chest feels tight    Wake up at night   1. Keep taking your Green Zone medications  2. Start taking your rescue medicine:    every 20 minutes for up to 1 hour. Then every 4 hours for 24-48 hours.  3. If you stay in the Yellow Zone for more than 12-24 hours, contact your doctor.  4. If you do not return to the Green Zone in 12-24 hours or you get worse, start taking your oral steroid medicine if prescribed by your provider.           RED ZONE Medical Alert - Get Help  I have ANY of these:    I feel awful    Medicine is not helping    Breathing getting harder    Trouble walking or talking    Nose opens wide to breathe       1. Take your rescue medicine NOW  2. If your provider has prescribed an oral steroid medicine, start taking it NOW  3. Call your doctor NOW  4. If you are still in the Red Zone after 20 minutes and you have not reached your doctor:    Take your rescue medicine again and    Call 911 or go to the emergency room right away    See  your regular doctor within 2 weeks of an Emergency Room or Urgent Care visit for follow-up treatment.          Annual Reminders:  Meet with Asthma Educator,  Flu Shot in the Fall, consider Pneumonia Vaccination for patients with asthma (aged 19 and older).    Pharmacy:    [id number] - [place], [place] - [address] [id number] - . [place], [place] - [address]  [id number] - [place], [place] - [address] AT [address] OF [facility]    Electronically signed by Talia Soto MD   Date: 01/28/22                    Asthma Triggers  How To Control Things That Make Your Asthma Worse    Triggers are things that make your asthma worse.  Look at the list below to help you find your triggers and   what you can do about them. You can help prevent asthma flare-ups by staying away from your triggers.      Trigger                                                          What you can do   Cigarette Smoke  Tobacco smoke can make asthma worse. Do not allow smoking in your home, car or around you.  Be sure no one smokes at a child s day care or school.  If you smoke, ask your health care provider for ways to help you quit.  Ask family members to quit too.  Ask your health care provider for a referral to Quit Plan to help you quit smoking, or call 7-063-784-PLAN.     Colds, Flu, Bronchitis  These are common triggers of asthma. Wash your hands often.  Don t touch your eyes, nose or mouth.  Get a flu shot every year.     Dust Mites  These are tiny bugs that live in cloth or carpet. They are too small to see. Wash sheets and blankets in hot water every week.   Encase pillows and mattress in dust mite proof covers.  Avoid having carpet if you can. If you have carpet, vacuum weekly.   Use a dust mask and HEPA vacuum.   Pollen and Outdoor Mold  Some people are allergic to trees, grass, or weed pollen, or molds. Try to keep your windows closed.  Limit time out doors when pollen count is high.   Ask  you health care provider about taking medicine during allergy season.     Animal Dander  Some people are allergic to skin flakes, urine or saliva from pets with fur or feathers. Keep pets with fur or feathers out of your home.    If you can t keep the pet outdoors, then keep the pet out of your bedroom.  Keep the bedroom door closed.  Keep pets off cloth furniture and away from stuffed toys.     Mice, Rats, and Cockroaches  Some people are allergic to the waste from these pests.   Cover food and garbage.  Clean up spills and food crumbs.  Store grease in the refrigerator.   Keep food out of the bedroom.   Indoor Mold  This can be a trigger if your home has high moisture. Fix leaking faucets, pipes, or other sources of water.   Clean moldy surfaces.  Dehumidify basement if it is damp and smelly.   Smoke, Strong Odors, and Sprays  These can reduce air quality. Stay away from strong odors and sprays, such as perfume, powder, hair spray, paints, smoke incense, paint, cleaning products, candles and new carpet.   Exercise or Sports  Some people with asthma have this trigger. Be active!  Ask your doctor about taking medicine before sports or exercise to prevent symptoms.    Warm up for 5-10 minutes before and after sports or exercise.     Other Triggers of Asthma  Cold air:  Cover your nose and mouth with a scarf.  Sometimes laughing or crying can be a trigger.  Some medicines and food can trigger asthma.

## 2022-01-29 NOTE — RESULT ENCOUNTER NOTE
Karyn Ly    I do think there is some mild arthritis in the lower part of that R hip joint, as we discussed.     Sincerely,       RUFINO SMITH M.D.

## 2022-02-14 DIAGNOSIS — E11.9 TYPE 2 DIABETES MELLITUS WITHOUT COMPLICATION, WITHOUT LONG-TERM CURRENT USE OF INSULIN (H): ICD-10-CM

## 2022-02-14 DIAGNOSIS — E78.5 HYPERLIPIDEMIA LDL GOAL <100: ICD-10-CM

## 2022-02-15 RX ORDER — ATORVASTATIN CALCIUM 20 MG/1
TABLET, FILM COATED ORAL
Qty: 90 TABLET | Refills: 3 | Status: SHIPPED | OUTPATIENT
Start: 2022-02-15 | End: 2023-04-03

## 2022-05-03 ENCOUNTER — THERAPY VISIT (OUTPATIENT)
Dept: SLEEP MEDICINE | Facility: CLINIC | Age: 62
End: 2022-05-03
Attending: INTERNAL MEDICINE
Payer: COMMERCIAL

## 2022-05-03 DIAGNOSIS — R06.83 SNORING: ICD-10-CM

## 2022-05-03 DIAGNOSIS — R51.9 SLEEP RELATED HEADACHES: ICD-10-CM

## 2022-05-03 DIAGNOSIS — G47.10 HYPERSOMNIA: ICD-10-CM

## 2022-05-03 DIAGNOSIS — R06.81 WITNESSED EPISODE OF APNEA: ICD-10-CM

## 2022-05-03 PROCEDURE — 95810 POLYSOM 6/> YRS 4/> PARAM: CPT | Performed by: INTERNAL MEDICINE

## 2022-05-10 ENCOUNTER — TELEPHONE (OUTPATIENT)
Dept: SLEEP MEDICINE | Facility: CLINIC | Age: 62
End: 2022-05-10
Payer: COMMERCIAL

## 2022-05-10 LAB — SLPCOMP: NORMAL

## 2022-06-06 ENCOUNTER — TELEPHONE (OUTPATIENT)
Dept: SLEEP MEDICINE | Facility: CLINIC | Age: 62
End: 2022-06-06
Payer: COMMERCIAL

## 2022-06-06 NOTE — TELEPHONE ENCOUNTER
Attempted to reach patient via phone unsuccessful left message for upcoming appointment was schedule as telephone. Patient to return call if she's able to do a video visit instead of telephone.     EDELMIRA Garcia  [facility]

## 2022-06-08 ENCOUNTER — TELEPHONE (OUTPATIENT)
Dept: SLEEP MEDICINE | Facility: CLINIC | Age: 62
End: 2022-06-08
Payer: COMMERCIAL

## 2022-06-08 NOTE — TELEPHONE ENCOUNTER
Called patient to let her know that results are typically mailed out about 2 weeks prior to appointment. Patient would like copy of test now if possible. Will discuss with .     Monet Miranda RN on 6/8/2022 at 10:56 AM

## 2022-06-08 NOTE — TELEPHONE ENCOUNTER
Reason for Call:  Other results     Detailed comments: Pt had psh 5/3. Pt was told the results were going to be mailed out. Pt's f/u is 6/24. Pt was wondering if the report could be mailed out before f/u appt.     Phone Number Patient can be reached at: Home number on file [phone] (home) or Cell number on file:    Telephone Information:   Mobile [phone]       Best Time: anytime     Can we leave a detailed message on this number? YES    Call taken on 6/8/2022 at 8:27 AM by Vibha Ash

## 2022-06-22 ASSESSMENT — SLEEP AND FATIGUE QUESTIONNAIRES
HOW LIKELY ARE YOU TO NOD OFF OR FALL ASLEEP WHILE SITTING AND TALKING TO SOMEONE: WOULD NEVER DOZE
HOW LIKELY ARE YOU TO NOD OFF OR FALL ASLEEP IN A CAR, WHILE STOPPED FOR A FEW MINUTES IN TRAFFIC: SLIGHT CHANCE OF DOZING
HOW LIKELY ARE YOU TO NOD OFF OR FALL ASLEEP WHILE SITTING AND READING: SLIGHT CHANCE OF DOZING
HOW LIKELY ARE YOU TO NOD OFF OR FALL ASLEEP WHILE SITTING QUIETLY AFTER LUNCH WITHOUT ALCOHOL: SLIGHT CHANCE OF DOZING
HOW LIKELY ARE YOU TO NOD OFF OR FALL ASLEEP WHILE WATCHING TV: SLIGHT CHANCE OF DOZING
HOW LIKELY ARE YOU TO NOD OFF OR FALL ASLEEP WHILE LYING DOWN TO REST IN THE AFTERNOON WHEN CIRCUMSTANCES PERMIT: MODERATE CHANCE OF DOZING
HOW LIKELY ARE YOU TO NOD OFF OR FALL ASLEEP WHILE SITTING INACTIVE IN A PUBLIC PLACE: WOULD NEVER DOZE
HOW LIKELY ARE YOU TO NOD OFF OR FALL ASLEEP WHEN YOU ARE A PASSENGER IN A CAR FOR AN HOUR WITHOUT A BREAK: MODERATE CHANCE OF DOZING

## 2022-06-24 ENCOUNTER — DOCUMENTATION ONLY (OUTPATIENT)
Dept: LAB | Facility: CLINIC | Age: 62
End: 2022-06-24

## 2022-06-24 ENCOUNTER — VIRTUAL VISIT (OUTPATIENT)
Dept: SLEEP MEDICINE | Facility: CLINIC | Age: 62
End: 2022-06-24
Payer: COMMERCIAL

## 2022-06-24 ENCOUNTER — MYC MEDICAL ADVICE (OUTPATIENT)
Dept: INTERNAL MEDICINE | Facility: CLINIC | Age: 62
End: 2022-06-24

## 2022-06-24 VITALS — HEIGHT: 59 IN | BODY MASS INDEX: 28.02 KG/M2 | WEIGHT: 139 LBS

## 2022-06-24 DIAGNOSIS — G47.10 HYPERSOMNIA: ICD-10-CM

## 2022-06-24 DIAGNOSIS — G47.33 OBSTRUCTIVE SLEEP APNEA: Primary | ICD-10-CM

## 2022-06-24 PROCEDURE — 99213 OFFICE O/P EST LOW 20 MIN: CPT | Mod: 95 | Performed by: INTERNAL MEDICINE

## 2022-06-24 ASSESSMENT — PAIN SCALES - GENERAL: PAINLEVEL: NO PAIN (0)

## 2022-06-24 NOTE — PATIENT INSTRUCTIONS
"     What is sleep apnea?     Sleep apnea is a condition that makes you stop breathing for short periods while you are asleep. There are 2 types of sleep apnea. One is called \"obstructive sleep apnea,\" and the other is called \"central sleep apnea.\"  In obstructive sleep apnea, you stop breathing because your throat narrows or closes (figure 1). In central sleep apnea, you stop breathing because your brain does not send the right signals to your muscles to make you breathe. When people talk about sleep apnea, they are usually referring to obstructive sleep apnea, which is what this article is about.  People with sleep apnea do not know that they stop breathing when they are asleep. But they do sometimes wake up startled or gasping for breath. They also often hear from loved ones that they snore.  What are the symptoms of sleep apnea? -- The main symptoms of sleep apnea are loud snoring, tiredness, and daytime sleepiness. Other symptoms can include:  ?Restless sleep  ?Waking up choking or gasping  ?Morning headaches, dry mouth, or sore throat  ?Waking up often to urinate  ?Waking up feeling unrested or groggy  ?Trouble thinking clearly or remembering things  Some people with sleep apnea don't have symptoms, or they don't know they have them. They might figure that it's normal to be tired or to snore a lot.  Should I see a doctor or nurse? -- Yes. If you think you might have sleep apnea, see your doctor.  Is there a test for sleep apnea? -- Yes. If your doctor or nurse suspects you have sleep apnea, he or she might send you for a \"sleep study.\" Sleep studies can sometimes be done at home, but they are usually done in a sleep lab. For the study, you spend the night in the lab, and you are hooked up to different machines that monitor your heart rate, breathing, and other body functions. The results of the test will tell your doctor or nurse if you have the disorder.  Is there anything I can do on my own to help my sleep " "apnea? -- Yes. Here are some things that might help:  ?Stay off your back when sleeping. (This is not always practical, because people cannot control their position while asleep. Plus, it only helps some people.)  ?Lose weight, if you are overweight  ?Avoid alcohol, because it can make sleep apnea worse  How is sleep apnea treated? -- The most effective treatment for sleep apnea is a device that keeps your airway open while you sleep. Treatment with this device is called \"continuous positive airway pressure,\" or CPAP. People getting CPAP wear a face mask at night that keeps them breathing (figure 2).  If your doctor or nurse recommends a CPAP machine, try to be patient about using it. The mask might seem uncomfortable to wear at first, and the machine might seem noisy, but using the machine can really pay off. People with sleep apnea who use a CPAP machine feel more rested and generally feel better.  There is also another device that you wear in your mouth called an \"oral appliance\" or \"mandibular advancement device.\" It also helps keep your airway open while you sleep.  In rare cases, when nothing else helps, doctors recommend surgery to keep the airway open. Surgery to do this is not always effective, and even when it is, the problem can come back.  Is sleep apnea dangerous? -- It can be. People with sleep apnea do not get good-quality sleep, so they are often tired and not alert. This puts them at risk for car accidents and other types of accidents. Plus, studies show that people with sleep apnea are more likely than others to have high blood pressure, heart attacks, and other serious heart problems. In people with severe sleep apnea, getting treated (for example, with a CPAP machine) can help prevent some of these problems.     GRAPHICS  Airway in a person with sleep apnea    Normally when a person sleeps, the airway remains open, and air can pass from the nose and mouth to the lungs. In a person with sleep " apnea, parts of the throat and mouth drop into the airway and block off the flow of air. This can cause loud snoring and interrupt breathing for short periods.  Graphic 50446 Version 5.0      Continuous positive airway pressure (CPAP) for sleep apnea    The CPAP mask gently blows air into your nose while you sleep. It puts just enough pressure on your airway to keep it from closing. The mask in this picture fits over just the nose. Other CPAP devices have masks that fit over the nose and mouth.     Equipment Instructions    We will process your PAP order and send it to a Durable Medical Equipment (DME) provider.    The medical equipment company should call you within 7 days.  If you have not heard from the company, please contact them to see if they received your order and are planning to call you.    Please call us at [phone] if you are unable to contact the medical equipment company or if they do not have the order.    If you are starting a new PAP machine, please call us after you use it the first night to let us know how it went. This call also helps us know that you received your equipment and that everything is ready. Please use our central phone number [phone]    Contact information for [employer] company:    [employer] Tel: [phone]

## 2022-06-24 NOTE — PROGRESS NOTES
"Karyn Ly is a 61 year old female being evaluated via a billable telephone visit.     \"This telephone visit will be conducted via a call between you and your physician/provider. We have found that certain health care needs can be provided without the need for an in-person visit or physical exam.  This service lets us provide the care you need with a telephone conversation.  If a prescription is necessary we can send it directly to your pharmacy.  If lab work is needed we can place an order for that and you can then stop by our lab to have the test done at a later time.\"    Telephone visits are billed at different rates depending on your insurance coverage.  Please reach out to your insurance provider with any questions.    Patient has given verbal consent for  a Telephone visit? Yes    What telephone number would you like your provider to contact at at:  [phone]    How would you like to obtain your AVS? Jose Guadalupe Marques    Telephone Visit Details:     Telephone Visit Start Time: 10:37 AM    Telephone Visit End Time:10:45 AM    Thank you for the opportunity to participate in the care of Karyn Ly.     She is a 61 year old  female patient who comes to the sleep medicine clinic for review of sleep study results. The study was completed on 05/03/2022  which showed that the patient had mild obstructive sleep apnea with an apnea hypopnea index of 12.5 events per hour with the lowest O2 Sat of 72%.    Assessment and Plan:  In summary Karyn Ly is a 61 year old year old female here for review of sleep study results.  1. Obstructive Sleep Apnea/Hypersomnia  We had an extensive conversation to review the results of her sleep study and to  her on the importance of treating sleep apnea. We discussed the options of treatment with oral appliance versus CPAP. Patient decided to proceed with CPAP. She will start using the device as soon as she receives it with the intention " to use if for the entire night. We discussed some tips to increase PAP tolerance as well as the normal curve of adaptation. CPAP is going to provide improved respiratory function during the night but it can cause some sleep disruption that tends to improve with continuous usage. She should return to the clinic in 7 weeks to review compliance and efficacy monitoring. Since the patient does not have any preference, we will use [employer] as the [employer] medical equipment company.     SIDDHARTH:  SIDDHARTH Total Score: 11  Total score - Preston: 8 (6/22/2022  3:42 PM)    Patient Active Problem List   Diagnosis     Hyperlipidemia LDL goal <100     Mild intermittent asthma     Type 2 diabetes mellitus without complication, without long-term current use of insulin (H)     Epiretinal membrane, mild, od > os     Shellfish allergy     Combined forms of age-related cataract, mild, of both eyes       Current Outpatient Medications   Medication Sig Dispense Refill     albuterol (PROAIR HFA/PROVENTIL HFA/VENTOLIN HFA) 108 (90 Base) MCG/ACT inhaler Inhale 2 puffs into the lungs every 6 hours as needed for shortness of breath / dyspnea or wheezing 18 g 11     aspirin (ASA) 81 MG chewable tablet Take 1 tablet (81 mg) by mouth daily       atorvastatin (LIPITOR) 20 MG tablet TAKE ONE TABLET BY MOUTH ONE TIME DAILY 90 tablet 3     blood glucose (CONTOUR NEXT TEST) test strip 1 strip by In Vitro route daily 100 strip 3     blood glucose monitoring (NO BRAND SPECIFIED) meter device kit Use to test blood sugar 1 times daily or as directed. Blood Glucose Monitor Brands: per insurance. 1 kit 0     EPINEPHrine (ANY BX GENERIC EQUIV) 0.3 MG/0.3ML injection 2-pack Inject 0.3 mLs (0.3 mg) into the muscle once as needed for anaphylaxis 0.6 mL 0     fluticasone (FLONASE) 50 MCG/ACT nasal spray Spray 1-2 sprays into both nostrils daily 16 g 6     losartan (COZAAR) 25 MG tablet TAKE 1/2 TABLET BY MOUTH ONCE DAILY. 45 tablet 3     metFORMIN  (GLUCOPHAGE-XR) 500 MG 24 hr tablet TAKE 2 TABLET BY MOUTH ONCE DAILY WITH DINNER 180 tablet 3     Microlet Lancets MISC USE ONCE DAILY 100 each 4     SM ASPIRIN ADULT LOW STRENGTH 81 MG EC tablet Take 1 tablet (81 mg) by mouth daily 90 tablet 3       Allergies   Allergen Reactions     Shellfish-Derived Products Anaphylaxis     Ace Inhibitors Cough        Labs/Studies:  - We reviewed the results of the overnight study as described on the HPI.       Patient verbalized understanding of these issues, agrees with the plan and all questions were answered today. Patient was given an opportuntity to voice any other symptoms or concerns not listed above. Patient did not have any other symptoms or concerns.      Christian Osei DO  Board Certified in Internal Medicine and Sleep Medicine      (Note created with Dragon voice recognition and unintended spelling errors and word substitutions may occur)

## 2022-06-27 NOTE — NURSING NOTE
Durable medical equipment order routed to [facility].    CPAP compliance instruction letter sent via [employer].       EDELMIRA Garcia  [facility]

## 2022-07-08 ENCOUNTER — LAB (OUTPATIENT)
Dept: LAB | Facility: CLINIC | Age: 62
End: 2022-07-08
Payer: COMMERCIAL

## 2022-07-08 DIAGNOSIS — E11.9 TYPE 2 DIABETES MELLITUS WITHOUT COMPLICATION, WITHOUT LONG-TERM CURRENT USE OF INSULIN (H): Primary | ICD-10-CM

## 2022-07-08 LAB — HBA1C MFR BLD: 7.2 % (ref 0–5.6)

## 2022-07-08 PROCEDURE — 36415 COLL VENOUS BLD VENIPUNCTURE: CPT

## 2022-07-08 PROCEDURE — 83036 HEMOGLOBIN GLYCOSYLATED A1C: CPT

## 2022-07-20 ASSESSMENT — ENCOUNTER SYMPTOMS
PARESTHESIAS: 0
HEARTBURN: 0
ARTHRALGIAS: 0
CHILLS: 0
SORE THROAT: 0
JOINT SWELLING: 0
MYALGIAS: 0
DIZZINESS: 0
EYE PAIN: 0
SHORTNESS OF BREATH: 0
PALPITATIONS: 0
HEMATOCHEZIA: 0
DIARRHEA: 0
NAUSEA: 0
BREAST MASS: 0
NERVOUS/ANXIOUS: 0
HEADACHES: 1
HEMATURIA: 0
FEVER: 0
CONSTIPATION: 0
FREQUENCY: 1
DYSURIA: 0
COUGH: 0
ABDOMINAL PAIN: 0
WEAKNESS: 0

## 2022-07-24 PROBLEM — G47.33 OSA (OBSTRUCTIVE SLEEP APNEA): Status: ACTIVE | Noted: 2022-07-24

## 2022-07-25 ENCOUNTER — OFFICE VISIT (OUTPATIENT)
Dept: INTERNAL MEDICINE | Facility: CLINIC | Age: 62
End: 2022-07-25
Payer: COMMERCIAL

## 2022-07-25 VITALS
HEART RATE: 71 BPM | TEMPERATURE: 97.8 F | SYSTOLIC BLOOD PRESSURE: 118 MMHG | BODY MASS INDEX: 27.42 KG/M2 | HEIGHT: 59 IN | OXYGEN SATURATION: 97 % | DIASTOLIC BLOOD PRESSURE: 70 MMHG | WEIGHT: 136 LBS

## 2022-07-25 DIAGNOSIS — J45.20 MILD INTERMITTENT ASTHMA WITHOUT COMPLICATION: ICD-10-CM

## 2022-07-25 DIAGNOSIS — Z23 NEED FOR SHINGLES VACCINE: ICD-10-CM

## 2022-07-25 DIAGNOSIS — E11.9 TYPE 2 DIABETES MELLITUS WITHOUT COMPLICATION, WITHOUT LONG-TERM CURRENT USE OF INSULIN (H): Primary | ICD-10-CM

## 2022-07-25 DIAGNOSIS — G47.33 OSA (OBSTRUCTIVE SLEEP APNEA): ICD-10-CM

## 2022-07-25 PROCEDURE — 90471 IMMUNIZATION ADMIN: CPT | Performed by: INTERNAL MEDICINE

## 2022-07-25 PROCEDURE — 90677 PCV20 VACCINE IM: CPT | Performed by: INTERNAL MEDICINE

## 2022-07-25 PROCEDURE — 90750 HZV VACC RECOMBINANT IM: CPT | Performed by: INTERNAL MEDICINE

## 2022-07-25 PROCEDURE — 99213 OFFICE O/P EST LOW 20 MIN: CPT | Mod: 25 | Performed by: INTERNAL MEDICINE

## 2022-07-25 PROCEDURE — 90472 IMMUNIZATION ADMIN EACH ADD: CPT | Performed by: INTERNAL MEDICINE

## 2022-07-25 RX ORDER — METFORMIN HCL 500 MG
TABLET, EXTENDED RELEASE 24 HR ORAL
Qty: 180 TABLET | Refills: 3 | Status: SHIPPED | OUTPATIENT
Start: 2022-07-25 | End: 2023-04-21

## 2022-07-25 RX ORDER — LOSARTAN POTASSIUM 25 MG/1
12.5 TABLET ORAL DAILY
Qty: 45 TABLET | Refills: 3 | Status: SHIPPED | OUTPATIENT
Start: 2022-07-25 | End: 2023-04-21

## 2022-07-25 ASSESSMENT — ENCOUNTER SYMPTOMS
EYE PAIN: 0
HEMATOCHEZIA: 0
JOINT SWELLING: 0
SORE THROAT: 0
ARTHRALGIAS: 0
FEVER: 0
CHILLS: 0
MYALGIAS: 0
PALPITATIONS: 0
CONSTIPATION: 0
WEAKNESS: 0
PARESTHESIAS: 0
SHORTNESS OF BREATH: 0
COUGH: 0
ABDOMINAL PAIN: 0
NAUSEA: 0
DIZZINESS: 0
HEMATURIA: 0
DYSURIA: 0
DIARRHEA: 0
BREAST MASS: 0
NERVOUS/ANXIOUS: 0
HEADACHES: 1
FREQUENCY: 1
HEARTBURN: 0

## 2022-07-25 ASSESSMENT — ASTHMA QUESTIONNAIRES: ACT_TOTALSCORE: 25

## 2022-07-25 NOTE — PROGRESS NOTES
SUBJECTIVE:   CC: Karyn Ly is an 61 year old woman who presents for preventive health visit.     62 y/o F here for DM f/u (NOT AFE)    H/o DMII Mild Intermittent asthma & hyperlipidemia. Recent dx KASSIDY,  CPAP recommended  ... she is on waiting list for mask, plans to start when she has the eq.       No side effects from medications.     Plans to go to [facility], bringing her parent's ashes to his home.          Patient has been advised of split billing requirements and indicates understanding: Yes  Healthy Habits:     Getting at least 3 servings of Calcium per day:  Yes    Bi-annual eye exam:  Yes    Dental care twice a year:  Yes    Sleep apnea or symptoms of sleep apnea:  Sleep apnea    Diet:  Low salt, Low fat/cholesterol and Carbohydrate counting    Frequency of exercise:  2-3 days/week    Duration of exercise:  30-45 minutes    Taking medications regularly:  Yes    Medication side effects:  None    PHQ-2 Total Score: 0    Additional concerns today:  No          No concerns     Today's PHQ-2 Score:   PHQ-2 ( 1999 [employer]) 7/20/2022   Q1: Little interest or pleasure in doing things 0   Q2: Feeling down, depressed or hopeless 0   PHQ-2 Score 0   PHQ-2 Total Score (12-17 Years)- Positive if 3 or more points; Administer PHQ-A if positive -   Q1: Little interest or pleasure in doing things Not at all   Q2: Feeling down, depressed or hopeless Not at all   PHQ-2 Score 0       Abuse: Current or Past (Physical, Sexual or Emotional) - No  Do you feel safe in your environment? Yes        Social History     Tobacco Use     Smoking status: Never Smoker     Smokeless tobacco: Never Used   Substance Use Topics     Alcohol use: Not Currently     Alcohol/week: 0.0 standard drinks     Comment: occasional      If you drink alcohol do you typically have >3 drinks per day or >7 drinks per week? No    Alcohol Use 7/20/2022   Prescreen: >3 drinks/day or >7 drinks/week? No   Prescreen: >3 drinks/day or >7 drinks/week?  -       Reviewed orders with patient.  Reviewed health maintenance and updated orders accordingly - Yes  Labs reviewed in EPIC  BP Readings from Last 3 Encounters:   07/25/22 118/70   01/28/22 110/64   09/24/21 127/83    Wt Readings from Last 3 Encounters:   07/25/22 61.7 kg (136 lb)   06/24/22 63 kg (139 lb)   01/28/22 63 kg (139 lb)                  Patient Active Problem List   Diagnosis     Hyperlipidemia LDL goal <100     Mild intermittent asthma     Type 2 diabetes mellitus without complication, without long-term current use of insulin (H)     Epiretinal membrane, mild, od > os     Shellfish allergy     Combined forms of age-related cataract, mild, of both eyes     KASSIDY (obstructive sleep apnea)     Past Surgical History:   Procedure Laterality Date     COLONOSCOPY  2/17/2014    Procedure: COLONOSCOPY;  colon-screening / vandana;  Surgeon: Donovan Dinero MD;  Location:  OR       Social History     Tobacco Use     Smoking status: Never Smoker     Smokeless tobacco: Never Used   Substance Use Topics     Alcohol use: Not Currently     Alcohol/week: 0.0 standard drinks     Comment: occasional      Family History   Problem Relation Age of Onset     Hypertension Mother      Diabetes Father      Glaucoma Father      Snoring Father      Lipids Brother      Macular Degeneration No family hx of          Current Outpatient Medications   Medication Sig Dispense Refill     aspirin (ASA) 81 MG chewable tablet Take 1 tablet (81 mg) by mouth daily       atorvastatin (LIPITOR) 20 MG tablet TAKE ONE TABLET BY MOUTH ONE TIME DAILY 90 tablet 3     blood glucose (CONTOUR NEXT TEST) test strip 1 strip by In Vitro route daily 100 strip 3     blood glucose monitoring (NO BRAND SPECIFIED) meter device kit Use to test blood sugar 1 times daily or as directed. Blood Glucose Monitor Brands: per insurance. 1 kit 0     EPINEPHrine (ANY BX GENERIC EQUIV) 0.3 MG/0.3ML injection 2-pack Inject 0.3 mLs (0.3 mg) into the muscle once  as needed for anaphylaxis 0.6 mL 0     losartan (COZAAR) 25 MG tablet Take 0.5 tablets (12.5 mg) by mouth daily 45 tablet 3     metFORMIN (GLUCOPHAGE XR) 500 MG 24 hr tablet TAKE 2 TABLET BY MOUTH ONCE DAILY WITH DINNER 180 tablet 3     Microlet Lancets MISC USE ONCE DAILY 100 each 4     SM ASPIRIN ADULT LOW STRENGTH 81 MG EC tablet Take 1 tablet (81 mg) by mouth daily 90 tablet 3     albuterol (PROAIR HFA/PROVENTIL HFA/VENTOLIN HFA) 108 (90 Base) MCG/ACT inhaler Inhale 2 puffs into the lungs every 6 hours as needed for shortness of breath / dyspnea or wheezing (Patient not taking: Reported on 7/25/2022) 18 g 11     Allergies   Allergen Reactions     Shellfish-Derived Products Anaphylaxis     Ace Inhibitors Cough     Recent Labs   Lab Test 07/08/22  0702 01/21/22  0756 09/07/21  0724 01/08/21  0905 06/05/20  1110 05/22/18  0658 11/10/17  0729 08/15/17  0654   A1C 7.2* 6.9* 6.6* 7.3* 6.8*   < > 6.4* 6.5*   LDL  --  94  --  95 77   < > 81 189*   HDL  --  43*  --  47* 46*   < > 44* 40*   TRIG  --  82  --  103 180*   < > 128 193*   ALT  --   --   --   --   --   --  27  --    CR  --  0.72  --  0.72 0.57   < >  --  0.80   GFRESTIMATED  --  >90  --  >90 >90   < >  --  74   GFRESTBLACK  --   --   --  >90 >90   < >  --  90   POTASSIUM  --  4.1  --  4.2 4.2   < >  --  3.9   TSH  --   --   --  0.74  --   --   --  1.34    < > = values in this interval not displayed.        Breast Cancer Screening:    Breast CA Risk Assessment (FHS-7) 1/28/2022   Do you have a family history of breast, colon, or ovarian cancer? No / Unknown       click delete button to remove this line now     Pertinent mammograms are reviewed under the imaging tab.    History of abnormal Pap smear:   Last 3 Pap Results:   PAP (no units)   Date Value   01/29/2021 NIL   08/17/2017 NIL   02/06/2014 NIL     PAP / HPV Latest Ref Rng & Units 1/29/2021 8/17/2017 2/6/2014   PAP (Historical) - NIL NIL NIL   HPV16 NEG:Negative Negative Negative -   HPV18 NEG:Negative  "Negative Negative -   HRHPV NEG:Negative Negative Negative -     Reviewed and updated as needed this visit by clinical staff                    Reviewed and updated as needed this visit by Provider                   Past Medical History:   Diagnosis Date     Asthma      Diabetes (H)       Past Surgical History:   Procedure Laterality Date     COLONOSCOPY  2/17/2014    Procedure: COLONOSCOPY;  colon-screening / vandana;  Surgeon: Donovan Dinero MD;  Location:  OR       Review of Systems   Constitutional: Negative for chills and fever.   HENT: Negative for congestion, ear pain, hearing loss and sore throat.    Eyes: Negative for pain and visual disturbance.   Respiratory: Negative for cough and shortness of breath.    Cardiovascular: Negative for chest pain, palpitations and peripheral edema.   Gastrointestinal: Negative for abdominal pain, constipation, diarrhea, heartburn, hematochezia and nausea.   Breasts:  Negative for tenderness, breast mass and discharge.   Genitourinary: Positive for frequency. Negative for dysuria, genital sores, hematuria, pelvic pain, urgency, vaginal bleeding and vaginal discharge.   Musculoskeletal: Negative for arthralgias, joint swelling and myalgias.   Skin: Negative for rash.   Neurological: Positive for headaches. Negative for dizziness, weakness and paresthesias.   Psychiatric/Behavioral: Negative for mood changes. The patient is not nervous/anxious.          OBJECTIVE:   /70 (BP Location: Left arm, Patient Position: Sitting, Cuff Size: Adult Regular)   Pulse 71   Temp 97.8  F (36.6  C) (Oral)   Ht 1.499 m (4' 11\")   Wt 61.7 kg (136 lb)   SpO2 97%   BMI 27.47 kg/m    Physical Exam  GENERAL: healthy, alert and no distress  EYES: Eyes grossly normal to inspection, PERRL and conjunctivae and sclerae normal  MS: no gross musculoskeletal defects noted, no edema  SKIN: no suspicious lesions or rashes  NEURO: Normal strength and tone, mentation intact and speech " "normal  PSYCH: mentation appears normal, affect normal/bright  Diabetic foot exam: normal DP and PT pulses, no trophic changes or ulcerative lesions and normal sensory exam    Diagnostic Test Results:  HgbA1C 7.2    ASSESSMENT/PLAN:       ICD-10-CM    1. Type 2 diabetes mellitus without complication, without long-term current use of insulin (H)  E11.9 losartan (COZAAR) 25 MG tablet     metFORMIN (GLUCOPHAGE XR) 500 MG 24 hr tablet     blood glucose (CONTOUR NEXT TEST) test strip   2. Mild intermittent asthma without complication  J45.20    3. Need for shingles vaccine  Z23    4. KASSIDY (obstructive sleep apnea)  G47.33      THIS WAS NOT AFE today  Her DM well controlled, though A1C more elevated. She thinks her sugars were higher last month with heat wave (less active) and now with better weather she is more active.     She will travel to [facility] in Winter, will be back Feb/March. She will let me know if extra meds needed.  Can get her AFE when returns in Springtime.         COUNSELING:  Reviewed preventive health counseling, as reflected in patient instructions            Estimated body mass index is 28.07 kg/m  as calculated from the following:    Height as of 6/24/22: 1.499 m (4' 11\").    Weight as of 6/24/22: 63 kg (139 lb).         She reports that she has never smoked. She has never used smokeless tobacco.      Counseling Resources:  ATP IV Guidelines  Pooled Cohorts Equation Calculator  Breast Cancer Risk Calculator  BRCA-Related Cancer Risk Assessment: FHS-7 Tool  FRAX Risk Assessment  ICSI Preventive Guidelines  Dietary Guidelines for Americans, 2010  USDA's MyPlate  ASA Prophylaxis  Lung CA Screening    Talia Soto MD  [facility]  "

## 2022-08-16 ENCOUNTER — TELEPHONE (OUTPATIENT)
Dept: SLEEP MEDICINE | Facility: CLINIC | Age: 62
End: 2022-08-16

## 2022-08-16 NOTE — TELEPHONE ENCOUNTER
You are scheduled for a PAP setup at the [place] showroom on 8/24/22 at 1PM.  The address for the [place] showroom is [address] [place] [address], [address], [place] [address] (in a strip mall on the [facility] corner of [address] You will need to ring doorbell at entrance to be buzzed into the building for appointment. The contact number for [facility] is [phone], Monday - Friday, 8:00 am - 4:30 pm. Please call if you need to make any changes to your appointment. Please contact your insurance company prior to appointment to confirm your benefits for durable medical equipment.

## 2022-08-24 ENCOUNTER — DOCUMENTATION ONLY (OUTPATIENT)
Dept: SLEEP MEDICINE | Facility: CLINIC | Age: 62
End: 2022-08-24

## 2022-08-24 DIAGNOSIS — G47.10 HYPERSOMNIA: ICD-10-CM

## 2022-08-24 DIAGNOSIS — G47.33 OBSTRUCTIVE SLEEP APNEA (ADULT) (PEDIATRIC): Primary | ICD-10-CM

## 2022-08-24 NOTE — PROGRESS NOTES
Patient was offered choice of vendor and chose [facility].  Patient Karyn Ly was set up at [place] on August 24, 2022. Patient received a Resmed Airsense 11 Pressures were set at 5-15 cm H2O.   Patient s ramp is 5 cm H2O for Auto and FLEX/EPR is 2.  Patient received a Resmed Mask name: AIRFIT F20  Full Face mask size Medium, heated tubing and heated humidifier.  Patient does need to meet compliance.   Justina Azul

## 2022-08-29 ENCOUNTER — DOCUMENTATION ONLY (OUTPATIENT)
Dept: SLEEP MEDICINE | Facility: CLINIC | Age: 62
End: 2022-08-29

## 2022-08-29 NOTE — PROGRESS NOTES
3 day Sleep therapy management telephone visit    Diagnostic AHI: 12.5  PSG    Confirmed with patient at time of call- N/A Patient is still interested in STM service       Message left for patient to return call.        Objective data     Order Settings for PAP  CPAP min 5    CPAP max 15             Device settings from machine CPAP min 5.0     CPAP max 15.0           EPR Setting TWO    RESMED soft response  OFF     Assessment: Nightly usage over four hours      Action plan: Patient to have 14 day STM visit. Patient has a follow up visit scheduled:   no    Replacement device: No  STM ordered by provider: Yes     Total time spent on accessing and  interpreting remote patient PAP therapy data  10 minutes    Total time spent counseling, coaching  and reviewing PAP therapy data with patient  1 minutes    [id number] No

## 2022-09-06 ENCOUNTER — TELEPHONE (OUTPATIENT)
Dept: SLEEP MEDICINE | Facility: CLINIC | Age: 62
End: 2022-09-06

## 2022-09-06 NOTE — TELEPHONE ENCOUNTER
Reason for Call:  Other update    Detailed comments: Received cpap 8/24/22 and is wondering if she needs to be seen    Phone Number Patient can be reached at: Home number on file [phone] (home)    Best Time: morning time    Can we leave a detailed message on this number? YES    Call taken on 9/6/2022 at 2:33 PM by Ruth Bartholomew

## 2022-09-06 NOTE — TELEPHONE ENCOUNTER
Called patient and LVM letting her know that yes she needs a 31-90 day follow up appt with Dr. Osei.

## 2022-09-08 ENCOUNTER — DOCUMENTATION ONLY (OUTPATIENT)
Dept: SLEEP MEDICINE | Facility: CLINIC | Age: 62
End: 2022-09-08

## 2022-09-08 NOTE — PROGRESS NOTES
14  DAY STM VISIT    Diagnostic AHI: 12.5  PSG    Data only recheck     Assessment: Pt meeting objective benchmarks.       Action plan: pt to have 30 day STM visit.      Device type: Auto-CPAP    PAP settings: CPAP min 5.0 cm  H20       CPAP max 15.0 cm  H20        95th% pressure 14.4 cm  H20        RESMED EPR level Setting: TWO    RESMED Soft response setting:  OFF    Mask type:  Full Face Mask    Objective measures: 14 day rolling measures      Compliance  92 %      Leak  27.94  lpm  last  upload      AHI 4.94   last  upload      Average number of minutes 459      Objective measure goal  Compliance   Goal >70%  Leak   Goal < 24 lpm  AHI  Goal < 5  Usage  Goal >240        Total time spent on accessing and interpreting remote patient PAP therapy data  10 minutes    Total time spent counseling, coaching  and reviewing PAP therapy data with patient  0 minutes    [id number]  [id number]  no (3 day STM)

## 2022-09-28 ENCOUNTER — DOCUMENTATION ONLY (OUTPATIENT)
Dept: SLEEP MEDICINE | Facility: CLINIC | Age: 62
End: 2022-09-28

## 2022-09-29 NOTE — PROGRESS NOTES
30 DAY STM VISIT    Diagnostic AHI: 12.5  PSG    Data only recheck     Assessment: Pt meeting objective benchmarks.     Action plan: pt to have 6 month STM visit  Patient has not scheduled a follow up visit.   Device type: Auto-CPAP  PAP settings: CPAP min 5.0 cm  H20     CPAP max 15.0 cm  H20    95th% pressure 14.3 cm  H20      RESMED EPR level Setting: TWO    RESMED Soft response setting:  OFF  Mask type:  Full Face Mask  Objective measures: 14 day rolling measures      Compliance  100 %      Leak  20 lpm  last  upload      AHI 3.69   last  upload      Average number of minutes 428      Objective measure goal  Compliance   Goal >70%  Leak   Goal < 24 lpm  AHI  Goal < 5  Usage  Goal >240        Total time spent on accessing and interpreting remote patient PAP therapy data  10 minutes    Total time spent counseling, coaching  and reviewing PAP therapy data with patient  0 minutes     [id number] this call  [id number] no  at 3 or 14 day [facility]

## 2022-10-03 ENCOUNTER — OFFICE VISIT (OUTPATIENT)
Dept: OPHTHALMOLOGY | Facility: CLINIC | Age: 62
End: 2022-10-03
Payer: COMMERCIAL

## 2022-10-03 DIAGNOSIS — H52.4 PRESBYOPIA: ICD-10-CM

## 2022-10-03 DIAGNOSIS — Z01.01 ENCOUNTER FOR EXAMINATION OF EYES AND VISION WITH ABNORMAL FINDINGS: ICD-10-CM

## 2022-10-03 DIAGNOSIS — H25.813 COMBINED FORMS OF AGE-RELATED CATARACT OF BOTH EYES: ICD-10-CM

## 2022-10-03 DIAGNOSIS — H35.373 EPIRETINAL MEMBRANE (ERM) OF BOTH EYES: ICD-10-CM

## 2022-10-03 DIAGNOSIS — E11.9 TYPE 2 DIABETES MELLITUS WITHOUT COMPLICATION, WITHOUT LONG-TERM CURRENT USE OF INSULIN (H): Primary | Chronic | ICD-10-CM

## 2022-10-03 PROCEDURE — 92015 DETERMINE REFRACTIVE STATE: CPT | Performed by: OPHTHALMOLOGY

## 2022-10-03 PROCEDURE — 92014 COMPRE OPH EXAM EST PT 1/>: CPT | Performed by: OPHTHALMOLOGY

## 2022-10-03 ASSESSMENT — REFRACTION_MANIFEST
OD_ADD: +2.50
OD_AXIS: 010
OS_CYLINDER: +1.25
OS_AXIS: 005
OS_ADD: +2.50
OD_SPHERE: PLANO
OD_CYLINDER: +0.50
OS_SPHERE: -0.50

## 2022-10-03 ASSESSMENT — CUP TO DISC RATIO
OS_RATIO: 0.4
OD_RATIO: 0.3

## 2022-10-03 ASSESSMENT — TONOMETRY
OS_IOP_MMHG: 18
OD_IOP_MMHG: 16
IOP_METHOD: APPLANATION

## 2022-10-03 ASSESSMENT — VISUAL ACUITY
OD_SC: 20/25
METHOD: SNELLEN - LINEAR
OS_SC+: -2
OS_SC: 20/25
OD_SC+: -2

## 2022-10-03 ASSESSMENT — CONF VISUAL FIELD
OD_NORMAL: 1
METHOD: COUNTING FINGERS
OS_NORMAL: 1

## 2022-10-03 ASSESSMENT — EXTERNAL EXAM - LEFT EYE: OS_EXAM: NORMAL

## 2022-10-03 ASSESSMENT — SLIT LAMP EXAM - LIDS
COMMENTS: 2+ DERMATOCHALASIS
COMMENTS: 2+ DERMATOCHALASIS

## 2022-10-03 ASSESSMENT — EXTERNAL EXAM - RIGHT EYE: OD_EXAM: NORMAL

## 2022-10-03 NOTE — PROGRESS NOTES
Current Eye Medications:  none     Subjective:  Diabetic, dilated exam - has line bifocals - has a hard time reading with it, still needing magnifying gls or pulling gls up to see in reading portion. No issues with seeing distance without correction. Usually wears gls for computer and reading.     Type II DM - oral med controlled.  Last BS: 129 this AM    Lab Results   Component Value Date    A1C 7.2 07/08/2022    A1C 6.9 01/21/2022    A1C 6.6 09/07/2021    A1C 7.3 01/08/2021    A1C 6.8 06/05/2020    A1C 6.8 06/03/2019    A1C 6.6 12/06/2018    A1C 6.6 08/20/2018        Objective:  See Ophthalmology Exam.       Assessment:  Stable eye exam.  No diabetic retinopathy.      ICD-10-CM    1. Type 2 diabetes mellitus without complication, without long-term current use of insulin (H)  E11.9       2. Combined forms of age-related cataract, mild, of both eyes  H25.813       3. Epiretinal membrane, mild, od > os  H35.373       4. Encounter for examination of eyes and vision with abnormal findings  Z01.01       5. Presbyopia  H52.4            Plan:  Glasses Rx given - optional  May use artificial tears up to 4 times daily both eyes. (Refresh Tears, Systane Ultra/Balance, or Theratears)   Possible posterior vitreous detachment (sudden onset large floater and/or flashing lights) both eyes  discussed.   Call in June 2023 for an appointment in October 2023 for Complete Exam    Dr. Lou [phone]

## 2022-10-03 NOTE — PATIENT INSTRUCTIONS
Glasses Rx given - optional  May use artificial tears up to 4 times daily both eyes. (Refresh Tears, Systane Ultra/Balance, or Theratears)   Possible posterior vitreous detachment (sudden onset large floater and/or flashing lights) both eyes  discussed.   Call in June 2023 for an appointment in October 2023 for Complete Exam    Dr. Lou [phone]     Patient Education   Diabetes weakens the blood vessels all over the body, including the eyes. Damage to the blood vessels in the eyes can cause swelling or bleeding into part of the eye (called the retina). This is called diabetic retinopathy ([facility]-tin[facility]-thee). If not treated, this disease can cause vision loss or blindness.   Symptoms may include blurred or distorted vision, but many people have no symptoms. It's important to see your eye doctor regularly to check for problems.   Early treatment and good control can help protect your vision. Here are the things you can do to help prevent vision loss:      1. Keep your blood sugar levels under tight control.      2. Bring high blood pressure under control.      3. No smoking.      4. Have yearly dilated eye exams.

## 2022-10-03 NOTE — LETTER
10/3/2022         RE: Karyn Ly  [address] [facility] [id number]        Dear Colleague,    Thank you for referring your patient, Karyn Ly, to the [facility]. Please see a copy of my visit note below.     Current Eye Medications:  none     Subjective:  Diabetic, dilated exam - has line bifocals - has a hard time reading with it, still needing magnifying gls or pulling gls up to see in reading portion. No issues with seeing distance without correction. Usually wears gls for computer and reading.     Type II DM - oral med controlled.  Last BS: 129 this AM    Lab Results   Component Value Date    A1C 7.2 07/08/2022    A1C 6.9 01/21/2022    A1C 6.6 09/07/2021    A1C 7.3 01/08/2021    A1C 6.8 06/05/2020    A1C 6.8 06/03/2019    A1C 6.6 12/06/2018    A1C 6.6 08/20/2018        Objective:  See Ophthalmology Exam.       Assessment:  Stable eye exam.  No diabetic retinopathy.      ICD-10-CM    1. Type 2 diabetes mellitus without complication, without long-term current use of insulin (H)  E11.9       2. Combined forms of age-related cataract, mild, of both eyes  H25.813       3. Epiretinal membrane, mild, od > os  H35.373       4. Encounter for examination of eyes and vision with abnormal findings  Z01.01       5. Presbyopia  H52.4            Plan:  Glasses Rx given - optional  May use artificial tears up to 4 times daily both eyes. (Refresh Tears, Systane Ultra/Balance, or Theratears)   Possible posterior vitreous detachment (sudden onset large floater and/or flashing lights) both eyes  discussed.   Call in June 2023 for an appointment in October 2023 for Complete Exam    Dr. Lou [phone]            Again, thank you for allowing me to participate in the care of your patient.        Sincerely,        Montana Lou MD

## 2023-01-14 ENCOUNTER — HEALTH MAINTENANCE LETTER (OUTPATIENT)
Age: 63
End: 2023-01-14

## 2023-03-01 ENCOUNTER — MYC MEDICAL ADVICE (OUTPATIENT)
Dept: INTERNAL MEDICINE | Facility: CLINIC | Age: 63
End: 2023-03-01
Payer: COMMERCIAL

## 2023-03-01 DIAGNOSIS — Z79.82 ASPIRIN LONG-TERM USE: ICD-10-CM

## 2023-03-01 DIAGNOSIS — E11.9 TYPE 2 DIABETES MELLITUS WITHOUT COMPLICATION, WITHOUT LONG-TERM CURRENT USE OF INSULIN (H): Primary | ICD-10-CM

## 2023-03-01 DIAGNOSIS — E78.5 HYPERLIPIDEMIA LDL GOAL <100: ICD-10-CM

## 2023-03-14 ENCOUNTER — APPOINTMENT (OUTPATIENT)
Dept: OPTOMETRY | Facility: CLINIC | Age: 63
End: 2023-03-14
Payer: COMMERCIAL

## 2023-03-14 PROCEDURE — V2200 LENS SPHER BIFOC PLANO 4.00D: HCPCS | Mod: RT | Performed by: OPHTHALMOLOGY

## 2023-03-14 PROCEDURE — V2020 VISION SVCS FRAMES PURCHASES: HCPCS | Performed by: OPHTHALMOLOGY

## 2023-03-21 ENCOUNTER — ANCILLARY PROCEDURE (OUTPATIENT)
Dept: MAMMOGRAPHY | Facility: CLINIC | Age: 63
End: 2023-03-21
Payer: COMMERCIAL

## 2023-03-21 DIAGNOSIS — Z12.31 VISIT FOR SCREENING MAMMOGRAM: ICD-10-CM

## 2023-03-21 PROCEDURE — 77067 SCR MAMMO BI INCL CAD: CPT | Mod: TC | Performed by: STUDENT IN AN ORGANIZED HEALTH CARE EDUCATION/TRAINING PROGRAM

## 2023-04-12 ENCOUNTER — LAB (OUTPATIENT)
Dept: LAB | Facility: CLINIC | Age: 63
End: 2023-04-12
Payer: COMMERCIAL

## 2023-04-12 DIAGNOSIS — E11.9 TYPE 2 DIABETES MELLITUS WITHOUT COMPLICATION, WITHOUT LONG-TERM CURRENT USE OF INSULIN (H): ICD-10-CM

## 2023-04-12 DIAGNOSIS — Z79.82 ASPIRIN LONG-TERM USE: ICD-10-CM

## 2023-04-12 DIAGNOSIS — E78.5 HYPERLIPIDEMIA LDL GOAL <100: ICD-10-CM

## 2023-04-12 LAB
ANION GAP SERPL CALCULATED.3IONS-SCNC: 12 MMOL/L (ref 7–15)
BUN SERPL-MCNC: 12.1 MG/DL (ref 8–23)
CALCIUM SERPL-MCNC: 9.4 MG/DL (ref 8.8–10.2)
CHLORIDE SERPL-SCNC: 104 MMOL/L (ref 98–107)
CHOLEST SERPL-MCNC: 147 MG/DL
CREAT SERPL-MCNC: 0.78 MG/DL (ref 0.51–0.95)
CREAT UR-MCNC: 164 MG/DL
DEPRECATED HCO3 PLAS-SCNC: 24 MMOL/L (ref 22–29)
ERYTHROCYTE [DISTWIDTH] IN BLOOD BY AUTOMATED COUNT: 12.4 % (ref 10–15)
GFR SERPL CREATININE-BSD FRML MDRD: 86 ML/MIN/1.73M2
GLUCOSE SERPL-MCNC: 136 MG/DL (ref 70–99)
HBA1C MFR BLD: 7.3 % (ref 0–5.6)
HCT VFR BLD AUTO: 39.6 % (ref 35–47)
HDLC SERPL-MCNC: 42 MG/DL
HGB BLD-MCNC: 12.7 G/DL (ref 11.7–15.7)
LDLC SERPL CALC-MCNC: 81 MG/DL
MCH RBC QN AUTO: 30.2 PG (ref 26.5–33)
MCHC RBC AUTO-ENTMCNC: 32.1 G/DL (ref 31.5–36.5)
MCV RBC AUTO: 94 FL (ref 78–100)
MICROALBUMIN UR-MCNC: <12 MG/L
MICROALBUMIN/CREAT UR: NORMAL MG/G{CREAT}
NONHDLC SERPL-MCNC: 105 MG/DL
PLATELET # BLD AUTO: 424 10E3/UL (ref 150–450)
POTASSIUM SERPL-SCNC: 5 MMOL/L (ref 3.4–5.3)
RBC # BLD AUTO: 4.21 10E6/UL (ref 3.8–5.2)
SODIUM SERPL-SCNC: 140 MMOL/L (ref 136–145)
TRIGL SERPL-MCNC: 121 MG/DL
VIT B12 SERPL-MCNC: 422 PG/ML (ref 232–1245)
WBC # BLD AUTO: 6 10E3/UL (ref 4–11)

## 2023-04-12 PROCEDURE — 83036 HEMOGLOBIN GLYCOSYLATED A1C: CPT

## 2023-04-12 PROCEDURE — 85027 COMPLETE CBC AUTOMATED: CPT

## 2023-04-12 PROCEDURE — 80061 LIPID PANEL: CPT

## 2023-04-12 PROCEDURE — 82570 ASSAY OF URINE CREATININE: CPT

## 2023-04-12 PROCEDURE — 82043 UR ALBUMIN QUANTITATIVE: CPT

## 2023-04-12 PROCEDURE — 82607 VITAMIN B-12: CPT

## 2023-04-12 PROCEDURE — 36415 COLL VENOUS BLD VENIPUNCTURE: CPT

## 2023-04-12 PROCEDURE — 80048 BASIC METABOLIC PNL TOTAL CA: CPT

## 2023-04-13 NOTE — RESULT ENCOUNTER NOTE
Karyn Ly    HgbA1C (diabetes) is at goal.   The Blood count, electrolytes and kidney function are normal.     RUFINO Toney M.D.

## 2023-04-17 ASSESSMENT — ENCOUNTER SYMPTOMS
CONSTIPATION: 0
NAUSEA: 0
MYALGIAS: 0
CHILLS: 0
EYE PAIN: 0
HEADACHES: 0
HEARTBURN: 0
FREQUENCY: 1
DIZZINESS: 0
COUGH: 0
ABDOMINAL PAIN: 0
DYSURIA: 0
ARTHRALGIAS: 0
JOINT SWELLING: 0
SHORTNESS OF BREATH: 0
NERVOUS/ANXIOUS: 0
PARESTHESIAS: 0
HEMATOCHEZIA: 0
HEMATURIA: 0
SORE THROAT: 0
DIARRHEA: 0
BREAST MASS: 0
PALPITATIONS: 0
WEAKNESS: 0
FEVER: 0

## 2023-04-17 ASSESSMENT — ASTHMA QUESTIONNAIRES
QUESTION_3 LAST FOUR WEEKS HOW OFTEN DID YOUR ASTHMA SYMPTOMS (WHEEZING, COUGHING, SHORTNESS OF BREATH, CHEST TIGHTNESS OR PAIN) WAKE YOU UP AT NIGHT OR EARLIER THAN USUAL IN THE MORNING: NOT AT ALL
QUESTION_1 LAST FOUR WEEKS HOW MUCH OF THE TIME DID YOUR ASTHMA KEEP YOU FROM GETTING AS MUCH DONE AT WORK, SCHOOL OR AT HOME: NONE OF THE TIME
QUESTION_4 LAST FOUR WEEKS HOW OFTEN HAVE YOU USED YOUR RESCUE INHALER OR NEBULIZER MEDICATION (SUCH AS ALBUTEROL): NOT AT ALL
QUESTION_5 LAST FOUR WEEKS HOW WOULD YOU RATE YOUR ASTHMA CONTROL: COMPLETELY CONTROLLED
ACT_TOTALSCORE: 25
QUESTION_2 LAST FOUR WEEKS HOW OFTEN HAVE YOU HAD SHORTNESS OF BREATH: NOT AT ALL
ACT_TOTALSCORE: 25

## 2023-04-17 ASSESSMENT — SLEEP AND FATIGUE QUESTIONNAIRES
HOW LIKELY ARE YOU TO NOD OFF OR FALL ASLEEP WHEN YOU ARE A PASSENGER IN A CAR FOR AN HOUR WITHOUT A BREAK: SLIGHT CHANCE OF DOZING
HOW LIKELY ARE YOU TO NOD OFF OR FALL ASLEEP IN A CAR, WHILE STOPPED FOR A FEW MINUTES IN TRAFFIC: WOULD NEVER DOZE
HOW LIKELY ARE YOU TO NOD OFF OR FALL ASLEEP WHILE SITTING QUIETLY AFTER LUNCH WITHOUT ALCOHOL: WOULD NEVER DOZE
HOW LIKELY ARE YOU TO NOD OFF OR FALL ASLEEP WHILE SITTING INACTIVE IN A PUBLIC PLACE: WOULD NEVER DOZE
HOW LIKELY ARE YOU TO NOD OFF OR FALL ASLEEP WHILE SITTING AND TALKING TO SOMEONE: WOULD NEVER DOZE
HOW LIKELY ARE YOU TO NOD OFF OR FALL ASLEEP WHILE LYING DOWN TO REST IN THE AFTERNOON WHEN CIRCUMSTANCES PERMIT: SLIGHT CHANCE OF DOZING
HOW LIKELY ARE YOU TO NOD OFF OR FALL ASLEEP WHILE SITTING AND READING: SLIGHT CHANCE OF DOZING
HOW LIKELY ARE YOU TO NOD OFF OR FALL ASLEEP WHILE WATCHING TV: WOULD NEVER DOZE

## 2023-04-20 ENCOUNTER — OFFICE VISIT (OUTPATIENT)
Dept: SLEEP MEDICINE | Facility: CLINIC | Age: 63
End: 2023-04-20
Payer: COMMERCIAL

## 2023-04-20 VITALS
BODY MASS INDEX: 27.77 KG/M2 | OXYGEN SATURATION: 98 % | WEIGHT: 137.5 LBS | SYSTOLIC BLOOD PRESSURE: 96 MMHG | DIASTOLIC BLOOD PRESSURE: 62 MMHG | HEART RATE: 83 BPM

## 2023-04-20 DIAGNOSIS — G47.10 HYPERSOMNIA: ICD-10-CM

## 2023-04-20 DIAGNOSIS — G47.33 OBSTRUCTIVE SLEEP APNEA: Primary | ICD-10-CM

## 2023-04-20 PROCEDURE — 99214 OFFICE O/P EST MOD 30 MIN: CPT | Performed by: INTERNAL MEDICINE

## 2023-04-20 NOTE — NURSING NOTE
Return in about 1 year reminder created and to be send via [employer].     Update pressure changed in Airview from original settings of Mode: Autoset 5.0 Min - 15.0 Max to new settings of Mode: Autoset 6.0 Min - 13.0 Max.    Blaine Merino ARA  [facility]

## 2023-04-20 NOTE — PROGRESS NOTES
Additional 10 minutes on the date of service was spent performing the following:    -Preparing to see the patient  -Ordering medications, tests, or procedures   -Documenting clinical information in the electronic or other health record     Thank you for the opportunity to participate in the care of Karyn Ly.     She is a 62 year old y/o female patient who comes to the sleep medicine clinic for follow up.  The patient was diagnosed with obstructive sleep apnea on 05/03/2022 (AHI = 12.5).  This is the patient's first clinical visit since getting started on CPAP therapy.  She reports that her sleep quality has improved and she is no longer waking up with headaches.  She states that she is benefiting from CPAP therapy.  She is complaining of some dry mouth upon awakening with CPAP usage.     Assessment and Plan:  In summary Karyn Ly is a 62 year old year old female who is here for follow up.    1. Obstructive sleep apnea/Hypersomnia  I congratulated the patient on her excellent CPAP usage.  I directed her to follow-up with the durable medical equipment company about getting a chinstrap.  I will also narrow her pressure range to 6-13 CWP.  Return to clinic every year.  - COMPREHENSIVE DME    Compliance Download data for 30 days:  Pressure setting: APAP 5-15 CWP  95% pressure: 12.9 CWP  Residual AHI: 1.5 events per hour  Leak: Minimal  Compliance: 100%  Mask Tolerance: Good  Skin irritation: None  DME: [facility]    Lab reviewed: Discussed with patient.    Cpap Fu Template    Question 4/17/2023 10:54 AM CDT - Filed by Patient   Do you use a CPAP Machine at home? Yes   Overall, on a scale of 0-10 how would you rate your CPAP? 10   Is your mask comfortable? No   If not, why? still having a hard time finding the right adjustment   Is your mask leaking? Yes   If yes, where do you feel it? nose and cheeks   How many nights per week does the mask leak? 2-3   Do you notice snoring with mask on?  No   Do you notice gasping arousals with mask on? No   Are you having significant oral or nasal dryness? Yes   Is the pressure setting comfortable? Yes   What type of mask do you use? Full Face Mask   What is your typical bedtime? 10- 11 pm. If I sleep much later, I still wake up  around 6:30 -7 in the morning   How long does it take you to go to sleep on PAP therapy? 5-10 min.  it varies. Sometimes I toss and turn before I fall asleep   What time do you typically get out of bed for the day? 6:30-7 am   How many hours on average per night are you using PAP therapy? 8 hrs   How many hours are you sleeping per night? 6-8 hrs   Do you feel well rested in the morning? Yes       SIDDHARTH:  SIDDHARTH Total Score: 9  Total score - [place]: 3 (4/17/2023 10:59 AM)        Patient Active Problem List   Diagnosis     Hyperlipidemia LDL goal <100     Mild intermittent asthma     Type 2 diabetes mellitus without complication, without long-term current use of insulin (H)     Epiretinal membrane, mild, od > os     Shellfish allergy     Combined forms of age-related cataract, mild, of both eyes     KASSIDY (obstructive sleep apnea)       Past Medical History:   Diagnosis Date     Asthma      Diabetes (H)        Past Surgical History:   Procedure Laterality Date     COLONOSCOPY  2/17/2014    Procedure: COLONOSCOPY;  colon-screening / vandana;  Surgeon: Donovan Dinero MD;  Location:  OR       Current Outpatient Medications   Medication Sig Dispense Refill     aspirin (ASA) 81 MG chewable tablet Take 1 tablet (81 mg) by mouth daily       atorvastatin (LIPITOR) 20 MG tablet TAKE ONE TABLET BY MOUTH ONE TIME DAILY 90 tablet 4     blood glucose (CONTOUR NEXT TEST) test strip 1 strip by In Vitro route daily 100 strip 3     blood glucose monitoring (NO BRAND SPECIFIED) meter device kit Use to test blood sugar 1 times daily or as directed. Blood Glucose Monitor Brands: per insurance. 1 kit 0     EPINEPHrine (ANY BX GENERIC EQUIV) 0.3 MG/0.3ML  injection 2-pack Inject 0.3 mLs (0.3 mg) into the muscle once as needed for anaphylaxis 0.6 mL 0     losartan (COZAAR) 25 MG tablet Take 0.5 tablets (12.5 mg) by mouth daily 45 tablet 3     metFORMIN (GLUCOPHAGE XR) 500 MG 24 hr tablet TAKE 2 TABLET BY MOUTH ONCE DAILY WITH DINNER 180 tablet 3     Microlet Lancets MISC USE ONCE DAILY 100 each 4     albuterol (PROAIR HFA/PROVENTIL HFA/VENTOLIN HFA) 108 (90 Base) MCG/ACT inhaler Inhale 2 puffs into the lungs every 6 hours as needed for shortness of breath / dyspnea or wheezing (Patient not taking: Reported on 7/25/2022) 18 g 11     SM ASPIRIN ADULT LOW STRENGTH 81 MG EC tablet Take 1 tablet (81 mg) by mouth daily (Patient not taking: Reported on 4/20/2023) 90 tablet 3       Allergies   Allergen Reactions     Shellfish-Derived Products Anaphylaxis     Ace Inhibitors Cough       Physical Exam:  BP 96/62   Pulse 83   Wt 62.4 kg (137 lb 8 oz)   SpO2 98%   BMI 27.77 kg/m    BMI:Body mass index is 27.77 kg/m .   GEN: NAD,   Head: Normocephalic.  EYES: EOMI  Psych: normal mood, normal affect    Labs/Studies:      No results found for: PH, PHARTERIAL, PO2, [id number], SAT, PCO2, HCO3, BASEEXCESS, SHERINE, BEB  Lab Results   Component Value Date    TSH 0.74 01/08/2021    TSH 1.34 08/15/2017     Lab Results   Component Value Date     (H) 04/12/2023     (H) 01/21/2022     Lab Results   Component Value Date    HGB 12.7 04/12/2023    HGB 12.9 06/05/2020     Lab Results   Component Value Date    BUN 12.1 04/12/2023    BUN 13 01/21/2022    CR 0.78 04/12/2023    CR 0.72 01/21/2022     Lab Results   Component Value Date    AST 16 11/10/2017    ALT 27 11/10/2017     No results found for: UAMP, UBARB, BENZODIAZEUR, UCANN, UCOC, OPIT, UPCP    Recent Labs   Lab Test 04/12/23  0732 01/21/22  0756    139   POTASSIUM 5.0 4.1   CHLORIDE 104 105   CO2 24 25   ANIONGAP 12 9   * 147*   BUN 12.1 13   CR 0.78 0.72   KELVIN 9.4 9.0       No results found for: MARCO    I  reviewed the efficacy and compliance report from her device. Data summarized on the HPI and the PAP compliance flow sheet.     Patient verbalized understanding of these issues, agrees with the plan and all questions were answered today. Patient was given an opportuntity to voice any other symptoms or concerns not listed above. Patient did not have any other symptoms or concerns.      Christian Osei DO  Board Certified in Internal Medicine and Sleep Medicine    (Note created with Dragon voice recognition and unintended spelling errors and word substitutions may occur)     Audio and visual devices were used for this virtual clinic visit with permission from patient.

## 2023-04-20 NOTE — NURSING NOTE
"Chief Complaint   Patient presents with     CPAP Follow Up     Problem with mask fitting       Initial BP 96/62   Pulse 83   Wt 62.4 kg (137 lb 8 oz)   SpO2 98%   BMI 27.77 kg/m   Estimated body mass index is 27.77 kg/m  as calculated from the following:    Height as of 7/25/22: 1.499 m (4' 11\").    Weight as of this encounter: 62.4 kg (137 lb 8 oz).    Medication Reconciliation: complete    DME: [facility]      EDELMIRA Garcia  [facility]      "

## 2023-04-21 ENCOUNTER — APPOINTMENT (OUTPATIENT)
Dept: OPTOMETRY | Facility: CLINIC | Age: 63
End: 2023-04-21
Payer: COMMERCIAL

## 2023-04-21 ENCOUNTER — OFFICE VISIT (OUTPATIENT)
Dept: INTERNAL MEDICINE | Facility: CLINIC | Age: 63
End: 2023-04-21
Payer: COMMERCIAL

## 2023-04-21 VITALS
HEIGHT: 60 IN | OXYGEN SATURATION: 98 % | SYSTOLIC BLOOD PRESSURE: 112 MMHG | DIASTOLIC BLOOD PRESSURE: 70 MMHG | BODY MASS INDEX: 26.9 KG/M2 | WEIGHT: 137 LBS | TEMPERATURE: 98.5 F | HEART RATE: 79 BPM

## 2023-04-21 DIAGNOSIS — Z00.01 ENCOUNTER FOR GENERAL ADULT MEDICAL EXAMINATION WITH ABNORMAL FINDINGS: Primary | ICD-10-CM

## 2023-04-21 DIAGNOSIS — G47.33 OSA (OBSTRUCTIVE SLEEP APNEA): ICD-10-CM

## 2023-04-21 DIAGNOSIS — J45.20 MILD INTERMITTENT ASTHMA WITHOUT COMPLICATION: ICD-10-CM

## 2023-04-21 DIAGNOSIS — E78.5 HYPERLIPIDEMIA LDL GOAL <100: ICD-10-CM

## 2023-04-21 DIAGNOSIS — E11.9 TYPE 2 DIABETES MELLITUS WITHOUT COMPLICATION, WITHOUT LONG-TERM CURRENT USE OF INSULIN (H): ICD-10-CM

## 2023-04-21 DIAGNOSIS — R23.8 SKIN IRRITATION: ICD-10-CM

## 2023-04-21 PROCEDURE — 92341 FIT SPECTACLES BIFOCAL: CPT | Performed by: OPTOMETRIST

## 2023-04-21 PROCEDURE — 99213 OFFICE O/P EST LOW 20 MIN: CPT | Mod: 25 | Performed by: INTERNAL MEDICINE

## 2023-04-21 PROCEDURE — 99396 PREV VISIT EST AGE 40-64: CPT | Performed by: INTERNAL MEDICINE

## 2023-04-21 RX ORDER — METFORMIN HCL 500 MG
1000 TABLET, EXTENDED RELEASE 24 HR ORAL 2 TIMES DAILY WITH MEALS
Qty: 360 TABLET | Refills: 3 | Status: SHIPPED | OUTPATIENT
Start: 2023-04-21 | End: 2024-01-17

## 2023-04-21 RX ORDER — ALBUTEROL SULFATE 90 UG/1
2 AEROSOL, METERED RESPIRATORY (INHALATION) EVERY 6 HOURS PRN
Qty: 18 G | Refills: 11 | Status: SHIPPED | OUTPATIENT
Start: 2023-04-21

## 2023-04-21 RX ORDER — LOSARTAN POTASSIUM 25 MG/1
12.5 TABLET ORAL DAILY
Qty: 45 TABLET | Refills: 3 | Status: SHIPPED | OUTPATIENT
Start: 2023-04-21 | End: 2024-01-17

## 2023-04-21 RX ORDER — TRIAMCINOLONE ACETONIDE 1 MG/G
CREAM TOPICAL 2 TIMES DAILY
Qty: 15 G | Refills: 1 | Status: SHIPPED | OUTPATIENT
Start: 2023-04-21

## 2023-04-21 ASSESSMENT — ENCOUNTER SYMPTOMS
DIARRHEA: 0
FREQUENCY: 1
CHILLS: 0
MYALGIAS: 0
SHORTNESS OF BREATH: 0
BREAST MASS: 0
JOINT SWELLING: 0
ABDOMINAL PAIN: 0
FEVER: 0
SORE THROAT: 0
DIZZINESS: 0
NERVOUS/ANXIOUS: 0
COUGH: 0
HEADACHES: 0
ARTHRALGIAS: 0
WEAKNESS: 0
HEMATOCHEZIA: 0
EYE PAIN: 0
HEMATURIA: 0
NAUSEA: 0
PALPITATIONS: 0
CONSTIPATION: 0
HEARTBURN: 0
DYSURIA: 0
PARESTHESIAS: 0

## 2023-04-21 NOTE — PATIENT INSTRUCTIONS
You can use some triamcinolone cream to rash -- thin layer once daily as needed.   Go to supply store for equipment re evaluation.     Increase metformin to two tabs twice daily....   (breakfast and supper)    Check sugars every morning on EVEN DAYS  Check sugars Lunch/dinner/bed on ODD DAYS>     Plan to have your PAP one more time when you are 65

## 2023-04-21 NOTE — LETTER
My Asthma Action Plan    Name: Karyn Ly   YOB: 1960  Date: 4/21/2023   My doctor: Talia Soto MD   My clinic: [facility]        My Rescue Medicine:   Albuterol inhaler (Proair/Ventolin/Proventil HFA)  2-4 puffs EVERY 4 HOURS as needed. Use a spacer if recommended by your provider.   My Asthma Severity:   Intermittent / Exercise Induced  Know your asthma triggers: smoke and upper respiratory infections             GREEN ZONE   Good Control  I feel good  No cough or wheeze  Can work, sleep and play without asthma symptoms       Take your asthma control medicine every day.     If exercise triggers your asthma, take your rescue medication  15 minutes before exercise or sports, and  During exercise if you have asthma symptoms  Spacer to use with inhaler: If you have a spacer, make sure to use it with your inhaler             YELLOW ZONE Getting Worse  I have ANY of these:  I do not feel good  Cough or wheeze  Chest feels tight  Wake up at night   Keep taking your Green Zone medications  Start taking your rescue medicine:  every 20 minutes for up to 1 hour. Then every 4 hours for 24-48 hours.  If you stay in the Yellow Zone for more than 12-24 hours, contact your doctor.  If you do not return to the Green Zone in 12-24 hours or you get worse, start taking your oral steroid medicine if prescribed by your provider.           RED ZONE Medical Alert - Get Help  I have ANY of these:  I feel awful  Medicine is not helping  Breathing getting harder  Trouble walking or talking  Nose opens wide to breathe       Take your rescue medicine NOW  If your provider has prescribed an oral steroid medicine, start taking it NOW  Call your doctor NOW  If you are still in the Red Zone after 20 minutes and you have not reached your doctor:  Take your rescue medicine again and  Call 911 or go to the emergency room right away    See your regular doctor within 2 weeks of an Emergency  Room or Urgent Care visit for follow-up treatment.          Annual Reminders:  Meet with Asthma Educator,  Flu Shot in the Fall, consider Pneumonia Vaccination for patients with asthma (aged 19 and older).    Pharmacy:    [id number] - [place], [place] - [address] [id number] - [place], [place] - [address]  [id number] - [place], [place] - [address] AT COR[address] OF [facility]    Electronically signed by Talia Soto MD   Date: 04/21/23                    Asthma Triggers  How To Control Things That Make Your Asthma Worse    Triggers are things that make your asthma worse.  Look at the list below to help you find your triggers and   what you can do about them. You can help prevent asthma flare-ups by staying away from your triggers.      Trigger                                                          What you can do   Cigarette Smoke  Tobacco smoke can make asthma worse. Do not allow smoking in your home, car or around you.  Be sure no one smokes at a child s day care or school.  If you smoke, ask your health care provider for ways to help you quit.  Ask family members to quit too.  Ask your health care provider for a referral to Quit Plan to help you quit smoking, or call 8-060-754-PLAN.     Colds, Flu, Bronchitis  These are common triggers of asthma. Wash your hands often.  Don t touch your eyes, nose or mouth.  Get a flu shot every year.     Dust Mites  These are tiny bugs that live in cloth or carpet. They are too small to see. Wash sheets and blankets in hot water every week.   Encase pillows and mattress in dust mite proof covers.  Avoid having carpet if you can. If you have carpet, vacuum weekly.   Use a dust mask and HEPA vacuum.   Pollen and Outdoor Mold  Some people are allergic to trees, grass, or weed pollen, or molds. Try to keep your windows closed.  Limit time out doors when pollen count is high.   Ask you health care provider about taking medicine  during allergy season.     Animal Dander  Some people are allergic to skin flakes, urine or saliva from pets with fur or feathers. Keep pets with fur or feathers out of your home.    If you can t keep the pet outdoors, then keep the pet out of your bedroom.  Keep the bedroom door closed.  Keep pets off cloth furniture and away from stuffed toys.     Mice, Rats, and Cockroaches  Some people are allergic to the waste from these pests.   Cover food and garbage.  Clean up spills and food crumbs.  Store grease in the refrigerator.   Keep food out of the bedroom.   Indoor Mold  This can be a trigger if your home has high moisture. Fix leaking faucets, pipes, or other sources of water.   Clean moldy surfaces.  Dehumidify basement if it is damp and smelly.   Smoke, Strong Odors, and Sprays  These can reduce air quality. Stay away from strong odors and sprays, such as perfume, powder, hair spray, paints, smoke incense, paint, cleaning products, candles and new carpet.   Exercise or Sports  Some people with asthma have this trigger. Be active!  Ask your doctor about taking medicine before sports or exercise to prevent symptoms.    Warm up for 5-10 minutes before and after sports or exercise.     Other Triggers of Asthma  Cold air:  Cover your nose and mouth with a scarf.  Sometimes laughing or crying can be a trigger.  Some medicines and food can trigger asthma.

## 2023-04-21 NOTE — PROGRESS NOTES
SUBJECTIVE:   CC: Karyn Hairston is an 62 year old who presents for preventive health visit.        View : No data to display.            Patient has been advised of split billing requirements and indicates understanding: Yes  Healthy Habits:     Getting at least 3 servings of Calcium per day:  Yes    Bi-annual eye exam:  Yes    Dental care twice a year:  Yes    Sleep apnea or symptoms of sleep apnea:  Sleep apnea    Diet:  Low salt and Low fat/cholesterol    Frequency of exercise:  2-3 days/week    Duration of exercise:  15-30 minutes    Taking medications regularly:  Yes    Medication side effects:  None    PHQ-2 Total Score: 0    61 y/o F here for AFE//DM recheck.       H/o DMII (metformin),  Mild Intermittent asthma & hyperlipidemia, KASSIDY (has mask now, benefitting)   >Recent HgbA1C 7.3 (at goal, trending up )   She is on metformin monotherapy, no GI symptoms.      She mentions avocado tea in [facility].  Since then, morning sugars SEEM lower.  Does not check during day.        Lab results, dry skin on her face from the cpap mask    Today's PHQ-2 Score:       4/17/2023    11:15 AM   PHQ-2 ( 1999 [employer])   Q1: Little interest or pleasure in doing things 0   Q2: Feeling down, depressed or hopeless 0   PHQ-2 Score 0   Q1: Little interest or pleasure in doing things Not at all   Q2: Feeling down, depressed or hopeless Not at all   PHQ-2 Score 0       Have you ever done Advance Care Planning? (For example, a Health Directive, POLST, or a discussion with a medical provider or your loved ones about your wishes): No, advance care planning information given to patient to review.  Patient plans to discuss their wishes with loved ones or provider.      Social History     Tobacco Use     Smoking status: Never     Smokeless tobacco: Never   Vaping Use     Vaping status: Not on file   Substance Use Topics     Alcohol use: Not Currently     Alcohol/week: 0.0 standard drinks of alcohol     Comment: occasional                4/17/2023    11:14 AM   Alcohol Use   Prescreen: >3 drinks/day or >7 drinks/week? No     Reviewed orders with patient.  Reviewed health maintenance and updated orders accordingly - Yes  Lab work is in process  Labs reviewed in EPIC  BP Readings from Last 3 Encounters:   04/21/23 112/70   04/20/23 96/62   07/25/22 118/70    Wt Readings from Last 3 Encounters:   04/21/23 62.1 kg (137 lb)   04/20/23 62.4 kg (137 lb 8 oz)   07/25/22 61.7 kg (136 lb)                  Patient Active Problem List   Diagnosis     Hyperlipidemia LDL goal <100     Mild intermittent asthma     Type 2 diabetes mellitus without complication, without long-term current use of insulin (H)     Epiretinal membrane, mild, od > os     Shellfish allergy     Combined forms of age-related cataract, mild, of both eyes     KASSIDY (obstructive sleep apnea)     Past Surgical History:   Procedure Laterality Date     COLONOSCOPY  2/17/2014    Procedure: COLONOSCOPY;  colon-screening / vandana;  Surgeon: Donovan Dinero MD;  Location:  OR       Social History     Tobacco Use     Smoking status: Never     Smokeless tobacco: Never   Vaping Use     Vaping status: Not on file   Substance Use Topics     Alcohol use: Not Currently     Alcohol/week: 0.0 standard drinks of alcohol     Comment: occasional      Family History   Problem Relation Age of Onset     Hypertension Mother      Diabetes Father      Glaucoma Father      Snoring Father      Lipids Brother      Macular Degeneration No family hx of          Current Outpatient Medications   Medication Sig Dispense Refill     albuterol (PROAIR HFA/PROVENTIL HFA/VENTOLIN HFA) 108 (90 Base) MCG/ACT inhaler Inhale 2 puffs into the lungs every 6 hours as needed for shortness of breath / dyspnea or wheezing 18 g 11     aspirin (ASA) 81 MG chewable tablet Take 1 tablet (81 mg) by mouth daily       ASPIRIN 81 PO Take 81 mg by mouth daily       atorvastatin (LIPITOR) 20 MG tablet TAKE ONE TABLET BY MOUTH ONE TIME  DAILY 90 tablet 4     blood glucose (CONTOUR NEXT TEST) test strip 1 strip by In Vitro route daily 100 strip 3     blood glucose monitoring (NO BRAND SPECIFIED) meter device kit Use to test blood sugar 1 times daily or as directed. Blood Glucose Monitor Brands: per insurance. 1 kit 0     EPINEPHrine (ANY BX GENERIC EQUIV) 0.3 MG/0.3ML injection 2-pack Inject 0.3 mLs (0.3 mg) into the muscle once as needed for anaphylaxis 0.6 mL 0     losartan (COZAAR) 25 MG tablet Take 0.5 tablets (12.5 mg) by mouth daily 45 tablet 3     metFORMIN (GLUCOPHAGE XR) 500 MG 24 hr tablet TAKE 2 TABLET BY MOUTH ONCE DAILY WITH DINNER 180 tablet 3     Microlet Lancets MISC USE ONCE DAILY 100 each 4     SM ASPIRIN ADULT LOW STRENGTH 81 MG EC tablet Take 1 tablet (81 mg) by mouth daily 90 tablet 3     Allergies   Allergen Reactions     Shellfish-Derived Products Anaphylaxis     Ace Inhibitors Cough     Recent Labs   Lab Test 23  0732 22  0702 22  0756 21  0724 21  0905 20  1110 18  0658 11/10/17  0729 08/15/17  0654   A1C 7.3* 7.2* 6.9*   < > 7.3* 6.8*   < > 6.4* 6.5*   LDL 81  --  94  --  95 77   < > 81 189*   HDL 42*  --  43*  --  47* 46*   < > 44* 40*   TRIG 121  --  82  --  103 180*   < > 128 193*   ALT  --   --   --   --   --   --   --  27  --    CR 0.78  --  0.72  --  0.72 0.57   < >  --  0.80   GFRESTIMATED 86  --  >90  --  >90 >90   < >  --  74   GFRESTBLACK  --   --   --   --  >90 >90   < >  --  90   POTASSIUM 5.0  --  4.1  --  4.2 4.2   < >  --  3.9   TSH  --   --   --   --  0.74  --   --   --  1.34    < > = values in this interval not displayed.        Breast Cancer Screenin/28/2022     7:22 AM   Breast CA Risk Assessment (FHS-7)   Do you have a family history of breast, colon, or ovarian cancer? No / Unknown         Mammogram Screening: Recommended mammography every 1-2 years with patient discussion and risk factor consideration  Pertinent mammograms are reviewed under the  imaging tab.    History of abnormal Pap smear: Last 3 Pap and HPV Results:       Latest Ref Rng & Units 1/29/2021     8:30 AM 1/29/2021     8:08 AM 8/17/2017     7:40 AM   PAP / HPV   PAP (Historical)   NIL      HPV 16 DNA NEG^Negative Negative    Negative     HPV 18 DNA NEG^Negative Negative    Negative     Other HR HPV NEG^Negative Negative    Negative           Latest Ref Rng & Units 1/29/2021     8:30 AM 1/29/2021     8:08 AM 8/17/2017     7:40 AM   PAP / HPV   PAP (Historical)   NIL      HPV 16 DNA NEG^Negative Negative    Negative     HPV 18 DNA NEG^Negative Negative    Negative     Other HR HPV NEG^Negative Negative    Negative       Reviewed and updated as needed this visit by clinical staff   Tobacco  Allergies  Meds              Reviewed and updated as needed this visit by Provider                 Past Medical History:   Diagnosis Date     Asthma      Diabetes (H)       Past Surgical History:   Procedure Laterality Date     COLONOSCOPY  2/17/2014    Procedure: COLONOSCOPY;  colon-screening / vandana;  Surgeon: Donovan Dinero MD;  Location:  OR       Review of Systems   Constitutional: Negative for chills and fever.   HENT: Negative for congestion, ear pain, hearing loss and sore throat.    Eyes: Negative for pain and visual disturbance.   Respiratory: Negative for cough and shortness of breath.    Cardiovascular: Negative for chest pain, palpitations and peripheral edema.   Gastrointestinal: Negative for abdominal pain, constipation, diarrhea, heartburn, hematochezia and nausea.   Breasts:  Negative for tenderness, breast mass and discharge.   Genitourinary: Positive for frequency and pelvic pain. Negative for dysuria, genital sores, hematuria, urgency, vaginal bleeding and vaginal discharge.   Musculoskeletal: Negative for arthralgias, joint swelling and myalgias.   Skin: Negative for rash.   Neurological: Negative for dizziness, weakness, headaches and paresthesias.  "  Psychiatric/Behavioral: Negative for mood changes. The patient is not nervous/anxious.             OBJECTIVE:   /70 (BP Location: Right arm, Patient Position: Sitting, Cuff Size: Adult Regular)   Pulse 79   Temp 98.5  F (36.9  C) (Oral)   Ht 1.511 m (4' 11.5\")   Wt 62.1 kg (137 lb)   SpO2 98%   BMI 27.21 kg/m       Physical Exam  GENERAL APPEARANCE: healthy, alert and no distress  EYES: Eyes grossly normal to inspection, PERRL and conjunctivae and sclerae normal  HENT: ear canals and TM's normal, nose and mouth without ulcers or lesions, oropharynx clear and oral mucous membranes moist  NECK: no adenopathy, no asymmetry, masses, or scars and thyroid normal to palpation  RESP: lungs clear to auscultation - no rales, rhonchi or wheezes  BREAST: normal without masses, tenderness or nipple discharge and no palpable axillary masses or adenopathy  CV: regular rate and rhythm, normal S1 S2, no S3 or S4, no murmur, click or rub, no peripheral edema and peripheral pulses strong  ABDOMEN: soft, nontender, no hepatosplenomegaly, no masses and bowel sounds normal   (female): deger  MS: no musculoskeletal defects are noted and gait is age appropriate without ataxia  SKIN: no suspicious lesions or rashes  Skin irritation around mouth, site of CPAP mask.  Started since she got replacment.   NEURO: Normal strength and tone, sensory exam grossly normal, mentation intact and speech normal  DIABETIC FOOT EXAM: normal DP and PT pulses, no trophic changes or ulcerative lesions and normal sensory exam  PSYCH: mentation appears normal and affect normal/bright    Diagnostic Test Results:  Labs reviewed in Epic with patient f2f today.     ASSESSMENT/PLAN:   (Z00.01) Encounter for general adult medical examination with abnormal findings  (primary encounter diagnosis)  Comment: doing well. Will be transferring care to [facility].  I have known her entire family well her, they are very supportive of one another   Plan:  " "    (E11.9) Type 2 diabetes mellitus without complication, without long-term current use of insulin (H)  Comment:  The A1C is slowly increasing.  She is doing all the right things: her needs are changing.   Will increase metformin to BID.    Side effects discussed and questions answered.   Plan: metFORMIN (GLUCOPHAGE XR) 500 MG 24 hr tablet,         OFFICE/OUTPT VISIT,EST,LEVL II             (G47.33) KASSIDY (obstructive sleep apnea)  Comment:  Recently started CPAP, she notes a great improvement in quality of sleep, less daytime sleepiness.   Plan:      (R23.8) Skin irritation  Comment:  She will go to medical supply with her CPAP and inquire about alternatives.  Triamcinolone to help temporarily with the current irritation.   Plan: triamcinolone (KENALOG) 0.1 % external cream,         OFFICE/OUTPT VISIT,EST,LEVL II             (J45.20) Mild intermittent asthma without complication  Comment:  No issues.    Prn albuterol   Plan:      (E78.5) Hyperlipidemia LDL goal <100  Comment:    Plan:            COUNSELING:  Reviewed preventive health counseling, as reflected in patient instructions       Regular exercise      BMI:   Estimated body mass index is 27.21 kg/m  as calculated from the following:    Height as of this encounter: 1.511 m (4' 11.5\").    Weight as of this encounter: 62.1 kg (137 lb).         She reports that she has never smoked. She has never used smokeless tobacco.       Talia Soto MD  [facility]  "

## 2023-05-09 ENCOUNTER — MYC MEDICAL ADVICE (OUTPATIENT)
Dept: INTERNAL MEDICINE | Facility: CLINIC | Age: 63
End: 2023-05-09
Payer: COMMERCIAL

## 2023-06-27 ENCOUNTER — TELEPHONE (OUTPATIENT)
Dept: SLEEP MEDICINE | Facility: CLINIC | Age: 63
End: 2023-06-27
Payer: COMMERCIAL

## 2023-06-27 NOTE — TELEPHONE ENCOUNTER
CALLED AND DISCUSSED THIS. TOLD PT ABOUT THE AIRTOUCH CUSHION THAT HAS MEMORY FOAM. SHE STATED SHE WOULD LIKE TO TRY THIS. SHIPPING TOUCH F20, MED CUSHION AND FILTERS. TOLD PT MY DIRECT # AND IF SHE CAN'T FIGURE OUT HOW TO TAKE OFF THE FIT CUSHION TO PUT ON THE TOUCH CUSHION SHE CAN CALL ME AND WE CAN DO A VIRTUAL VISIT SO SHE CAN SEE. PT AGREED TO THIS.

## 2023-07-22 ENCOUNTER — HEALTH MAINTENANCE LETTER (OUTPATIENT)
Age: 63
End: 2023-07-22

## 2023-07-25 ENCOUNTER — LAB (OUTPATIENT)
Dept: LAB | Facility: CLINIC | Age: 63
End: 2023-07-25
Payer: COMMERCIAL

## 2023-07-25 DIAGNOSIS — E11.9 TYPE 2 DIABETES MELLITUS WITHOUT COMPLICATION, WITHOUT LONG-TERM CURRENT USE OF INSULIN (H): Chronic | ICD-10-CM

## 2023-07-25 LAB — HBA1C MFR BLD: 6.9 % (ref 0–5.6)

## 2023-07-25 PROCEDURE — 83036 HEMOGLOBIN GLYCOSYLATED A1C: CPT

## 2023-07-25 PROCEDURE — 36415 COLL VENOUS BLD VENIPUNCTURE: CPT

## 2023-08-02 ENCOUNTER — OFFICE VISIT (OUTPATIENT)
Dept: INTERNAL MEDICINE | Facility: CLINIC | Age: 63
End: 2023-08-02
Payer: COMMERCIAL

## 2023-08-02 VITALS
RESPIRATION RATE: 15 BRPM | WEIGHT: 128 LBS | OXYGEN SATURATION: 97 % | TEMPERATURE: 98.5 F | HEART RATE: 64 BPM | HEIGHT: 60 IN | SYSTOLIC BLOOD PRESSURE: 112 MMHG | BODY MASS INDEX: 25.13 KG/M2 | DIASTOLIC BLOOD PRESSURE: 62 MMHG

## 2023-08-02 DIAGNOSIS — E78.5 HYPERLIPIDEMIA LDL GOAL <100: ICD-10-CM

## 2023-08-02 DIAGNOSIS — Z91.013 SHELLFISH ALLERGY: ICD-10-CM

## 2023-08-02 DIAGNOSIS — E11.9 TYPE 2 DIABETES MELLITUS WITHOUT COMPLICATION, WITHOUT LONG-TERM CURRENT USE OF INSULIN (H): Primary | Chronic | ICD-10-CM

## 2023-08-02 PROCEDURE — 99214 OFFICE O/P EST MOD 30 MIN: CPT | Performed by: INTERNAL MEDICINE

## 2023-08-02 RX ORDER — EPINEPHRINE 0.3 MG/.3ML
0.3 INJECTION SUBCUTANEOUS
Qty: 0.6 ML | Refills: 0 | Status: SHIPPED | OUTPATIENT
Start: 2023-08-02

## 2023-08-02 NOTE — PROGRESS NOTES
Assessment & Plan     (E11.9) Type 2 diabetes mellitus without complication, without long-term current use of insulin (H)  (primary encounter diagnosis)  Comment: controlled after an increase in metformin earlier in the year  Plan: Hemoglobin A1c        Goal will be to keep under 7%. Recheck in 4-5 months  Eye exam due in Oct, she is aware.    (E78.5) Hyperlipidemia LDL goal <100  Comment: on statin  Plan: Will plan to continue on previous medication without changes     (Z91.013) Shellfish allergy  Comment: hx of  Plan: EPINEPHrine (ANY BX GENERIC EQUIV) 0.3 MG/0.3ML        injection 2-pack        Renewed.    See me in 4-5 months                 Deric Romeo MD  [facility]   Karyn Hairston is a 62 year old, presenting for the following health issues:  Establish Care and Diabetes (The patient would like to check of weight loss.)      8/2/2023    11:07 AM   Additional Questions   Roomed by Laney LAGOS CMA       History of Present Illness       Diabetes:   She presents for follow up of diabetes.  She is checking home blood glucose one time daily.   She checks blood glucose before meals.  Blood glucose is never over 200 and never under 70. She is aware of hypoglycemia symptoms including shakiness.   She is concerned about other.    She is not experiencing numbness or burning in feet, excessive thirst, blurry vision, weight changes or redness, sores or blisters on feet.           She eats 2-3 servings of fruits and vegetables daily.She consumes 0 sweetened beverage(s) daily.She exercises with enough effort to increase her heart rate 30 to 60 minutes per day.  She exercises with enough effort to increase her heart rate 5 days per week.   She is taking medications regularly.               Review of Systems         Objective    LMP  (LMP Unknown)   Breastfeeding No   There is no height or weight on file to calculate BMI.  Physical Exam

## 2023-10-04 ENCOUNTER — OFFICE VISIT (OUTPATIENT)
Dept: OPHTHALMOLOGY | Facility: CLINIC | Age: 63
End: 2023-10-04
Payer: COMMERCIAL

## 2023-10-04 DIAGNOSIS — H35.373 EPIRETINAL MEMBRANE (ERM) OF BOTH EYES: ICD-10-CM

## 2023-10-04 DIAGNOSIS — E11.9 TYPE 2 DIABETES MELLITUS WITHOUT COMPLICATION, WITHOUT LONG-TERM CURRENT USE OF INSULIN (H): Primary | ICD-10-CM

## 2023-10-04 DIAGNOSIS — Z01.01 ENCOUNTER FOR EXAMINATION OF EYES AND VISION WITH ABNORMAL FINDINGS: ICD-10-CM

## 2023-10-04 DIAGNOSIS — H52.4 PRESBYOPIA: ICD-10-CM

## 2023-10-04 DIAGNOSIS — H25.813 COMBINED FORMS OF AGE-RELATED CATARACT OF BOTH EYES: ICD-10-CM

## 2023-10-04 PROCEDURE — 92134 CPTRZ OPH DX IMG PST SGM RTA: CPT | Performed by: OPHTHALMOLOGY

## 2023-10-04 PROCEDURE — 92015 DETERMINE REFRACTIVE STATE: CPT | Performed by: OPHTHALMOLOGY

## 2023-10-04 PROCEDURE — 92014 COMPRE OPH EXAM EST PT 1/>: CPT | Performed by: OPHTHALMOLOGY

## 2023-10-04 ASSESSMENT — SLIT LAMP EXAM - LIDS
COMMENTS: 2+ DERMATOCHALASIS
COMMENTS: 2+ DERMATOCHALASIS

## 2023-10-04 ASSESSMENT — CONF VISUAL FIELD
OS_SUPERIOR_TEMPORAL_RESTRICTION: 0
METHOD: COUNTING FINGERS
OD_SUPERIOR_NASAL_RESTRICTION: 0
OS_INFERIOR_NASAL_RESTRICTION: 0
OD_INFERIOR_TEMPORAL_RESTRICTION: 0
OD_NORMAL: 1
OS_SUPERIOR_NASAL_RESTRICTION: 0
OS_NORMAL: 1
OD_INFERIOR_NASAL_RESTRICTION: 0
OD_SUPERIOR_TEMPORAL_RESTRICTION: 0
OS_INFERIOR_TEMPORAL_RESTRICTION: 0

## 2023-10-04 ASSESSMENT — REFRACTION_MANIFEST
OS_ADD: +2.75
OD_CYLINDER: +0.50
OS_AXIS: 010
OD_ADD: +2.75
OD_AXIS: 015
OS_CYLINDER: +1.25
OD_SPHERE: -0.25
OS_SPHERE: -0.50

## 2023-10-04 ASSESSMENT — REFRACTION_WEARINGRX
OS_SPHERE: +2.00
OD_AXIS: 012
SPECS_TYPE: READING
OS_CYLINDER: +1.25
OS_AXIS: 005
OD_SPHERE: +2.50
OD_CYLINDER: +0.50

## 2023-10-04 ASSESSMENT — VISUAL ACUITY
CORRECTION_TYPE: GLASSES
METHOD: SNELLEN - LINEAR
OS_SC: 20/40
OS_CC: J1+
OS_PH_SC: 20/30
OD_CC: J1+ SLOW
OD_SC: 20/30
OD_SC+: -1
OS_SC+: -2

## 2023-10-04 ASSESSMENT — EXTERNAL EXAM - RIGHT EYE: OD_EXAM: NORMAL

## 2023-10-04 ASSESSMENT — CUP TO DISC RATIO
OS_RATIO: 0.4
OD_RATIO: 0.3

## 2023-10-04 ASSESSMENT — EXTERNAL EXAM - LEFT EYE: OS_EXAM: NORMAL

## 2023-10-04 ASSESSMENT — TONOMETRY
OD_IOP_MMHG: 14
OS_IOP_MMHG: 15
IOP_METHOD: APPLANATION

## 2023-10-04 NOTE — PATIENT INSTRUCTIONS
"Glasses prescription given - optional    May use artificial tears up to four times a day (like Refresh Optive, Systane Balance, TheraTears, or generic artificial tears are ok. Avoid \"get the red out\" drops).     Possible posterior vitreous detachment (sudden onset large floater and/or flashing lights) both eyes discussed.     Call in June 2024 for an appointment in October 2024 for Complete Exam    Dr. Lou [phone]      Patient Education   Diabetes weakens the blood vessels all over the body, including the eyes. Damage to the blood vessels in the eyes can cause swelling or bleeding into part of the eye (called the retina). This is called diabetic retinopathy (MONIQUE-tin-AH-puh-thee). If not treated, this disease can cause vision loss or blindness.   Symptoms may include blurred or distorted vision, but many people have no symptoms. It's important to see your eye doctor regularly to check for problems.   Early treatment and good control can help protect your vision. Here are the things you can do to help prevent vision loss:      1. Keep your blood sugar levels under tight control.      2. Bring high blood pressure under control.      3. No smoking.      4. Have yearly dilated eye exams.     "

## 2023-10-04 NOTE — LETTER
"    10/4/2023         RE: Karyn Ly  [address] [facility] [address]        Dear Colleague,    Thank you for referring your patient, Karyn Ly, to the [facility]. Please see a copy of my visit note below.     Current Eye Medications:  artificial tears - both eyes PRN      Subjective:  diabetic, dilated exam - has glasses for reading, notices that she needs more lighting now to see the words better. No trouble with distance, no issues with seeing road signs. Uses AT's prn dryness.     Type II DM - oral med controlled.   Blood sugars - elevated this AM - forgot to take meds the last two days    Lab Results   Component Value Date    A1C 6.9 07/25/2023    A1C 7.3 04/12/2023    A1C 7.2 07/08/2022    A1C 6.9 01/21/2022    A1C 6.6 09/07/2021    A1C 7.3 01/08/2021    A1C 6.8 06/05/2020    A1C 6.8 06/03/2019    A1C 6.6 12/06/2018    A1C 6.6 08/20/2018        Objective:  See Ophthalmology Exam.       Assessment:  Stable eye exam in patient with diabetes.  No diabetic retinopathy.  Persistent VMT left eye on retinal OCT.      ICD-10-CM    1. Type 2 diabetes mellitus without complication, without long-term current use of insulin (H)  E11.9       2. Combined forms of age-related cataract, mild, of both eyes  H25.813       3. Epiretinal membrane, mild, od > os  H35.373       4. Encounter for examination of eyes and vision with abnormal findings  Z01.01       5. Presbyopia  H52.4            Plan:  Glasses prescription given - optional    May use artificial tears up to four times a day (like Refresh Optive, Systane Balance, TheraTears, or generic artificial tears are ok. Avoid \"get the red out\" drops).     Possible posterior vitreous detachment (sudden onset large floater and/or flashing lights) both eyes discussed.     Call in June 2024 for an appointment in October 2024 for Complete Exam    Dr. Lou [phone]           Again, thank you for allowing me to " participate in the care of your patient.        Sincerely,        Montana Lou MD

## 2023-10-04 NOTE — PROGRESS NOTES
" Current Eye Medications:  artificial tears - both eyes PRN      Subjective:  diabetic, dilated exam - has glasses for reading, notices that she needs more lighting now to see the words better. No trouble with distance, no issues with seeing road signs. Uses AT's prn dryness.     Type II DM - oral med controlled.   Blood sugars - elevated this AM - forgot to take meds the last two days    Lab Results   Component Value Date    A1C 6.9 07/25/2023    A1C 7.3 04/12/2023    A1C 7.2 07/08/2022    A1C 6.9 01/21/2022    A1C 6.6 09/07/2021    A1C 7.3 01/08/2021    A1C 6.8 06/05/2020    A1C 6.8 06/03/2019    A1C 6.6 12/06/2018    A1C 6.6 08/20/2018        Objective:  See Ophthalmology Exam.       Assessment:  Stable eye exam in patient with diabetes.  No diabetic retinopathy.  Persistent VMT left eye on retinal OCT.      ICD-10-CM    1. Type 2 diabetes mellitus without complication, without long-term current use of insulin (H)  E11.9       2. Combined forms of age-related cataract, mild, of both eyes  H25.813       3. Epiretinal membrane, mild, od > os  H35.373       4. Encounter for examination of eyes and vision with abnormal findings  Z01.01       5. Presbyopia  H52.4            Plan:  Glasses prescription given - optional    May use artificial tears up to four times a day (like Refresh Optive, Systane Balance, TheraTears, or generic artificial tears are ok. Avoid \"get the red out\" drops).     Possible posterior vitreous detachment (sudden onset large floater and/or flashing lights) both eyes discussed.     Call in June 2024 for an appointment in October 2024 for Complete Exam    Dr. Lou [phone]         "

## 2023-12-09 ENCOUNTER — HEALTH MAINTENANCE LETTER (OUTPATIENT)
Age: 63
End: 2023-12-09

## 2023-12-14 ENCOUNTER — DOCUMENTATION ONLY (OUTPATIENT)
Dept: INTERNAL MEDICINE | Facility: CLINIC | Age: 63
End: 2023-12-14
Payer: COMMERCIAL

## 2023-12-14 NOTE — PROGRESS NOTES
This patient has a future lab only appointment and would like to have a lipid panel done. Please send orders. Thanks, [facility]

## 2023-12-15 NOTE — PROGRESS NOTES
Spoke with patient who will wait until follow up appointment with Dr. Romeo to have A1C and lipid panel completed.     Patient requesting appointment on 01/17 due to family coming in to see Dr. Romeo that day. Informed patient there are no available appointments that day and request would need to be approved.     Advised message will be sent to PCP. Patient verbalized understanding.

## 2023-12-15 NOTE — PROGRESS NOTES
Please reach out to patient  I do not see a reason for a lipid panel, she had one already this year and it looked good.  He is due for a follow up with me in the next couple of months, we can do labs at that time as she needs a hbga1c  Thanks    Deric Romeo MD on 12/14/2023 at 11:29 PM

## 2023-12-15 NOTE — PROGRESS NOTES
That would be okay though she would need to be aware that it would be a fairly short visit out of respect for patients that come after them.  She could take the virtual slot but have her come in and get roomed at same time as her family member    Deric Romeo MD on 12/15/2023 at 10:00 AM

## 2023-12-15 NOTE — PROGRESS NOTES
Spoke with patient and relayed information.    Patient OK with shorter appointment.    Advised that appointment time online will reflect differently than discussed time due to scheduling however patient should come at the arrival time of requested family member. Advised labs can be collected at this visit.     Patient verbalized understanding and had no further questions at time of call.

## 2024-01-17 ENCOUNTER — LAB (OUTPATIENT)
Dept: LAB | Facility: CLINIC | Age: 64
End: 2024-01-17
Payer: COMMERCIAL

## 2024-01-17 ENCOUNTER — OFFICE VISIT (OUTPATIENT)
Dept: INTERNAL MEDICINE | Facility: CLINIC | Age: 64
End: 2024-01-17
Payer: COMMERCIAL

## 2024-01-17 VITALS
TEMPERATURE: 98 F | SYSTOLIC BLOOD PRESSURE: 110 MMHG | DIASTOLIC BLOOD PRESSURE: 59 MMHG | RESPIRATION RATE: 15 BRPM | WEIGHT: 125 LBS | BODY MASS INDEX: 24.54 KG/M2 | OXYGEN SATURATION: 98 % | HEIGHT: 60 IN | HEART RATE: 69 BPM

## 2024-01-17 DIAGNOSIS — I10 PRIMARY HYPERTENSION: ICD-10-CM

## 2024-01-17 DIAGNOSIS — E11.9 TYPE 2 DIABETES MELLITUS WITHOUT COMPLICATION, WITHOUT LONG-TERM CURRENT USE OF INSULIN (H): Primary | ICD-10-CM

## 2024-01-17 DIAGNOSIS — E11.9 TYPE 2 DIABETES MELLITUS WITHOUT COMPLICATION, WITHOUT LONG-TERM CURRENT USE OF INSULIN (H): Chronic | ICD-10-CM

## 2024-01-17 DIAGNOSIS — Z12.11 SCREEN FOR COLON CANCER: ICD-10-CM

## 2024-01-17 DIAGNOSIS — E78.5 HYPERLIPIDEMIA LDL GOAL <100: ICD-10-CM

## 2024-01-17 LAB — HBA1C MFR BLD: 6.1 % (ref 0–5.6)

## 2024-01-17 PROCEDURE — 99214 OFFICE O/P EST MOD 30 MIN: CPT | Performed by: INTERNAL MEDICINE

## 2024-01-17 PROCEDURE — 83036 HEMOGLOBIN GLYCOSYLATED A1C: CPT

## 2024-01-17 PROCEDURE — 36415 COLL VENOUS BLD VENIPUNCTURE: CPT

## 2024-01-17 RX ORDER — ATORVASTATIN CALCIUM 20 MG/1
20 TABLET, FILM COATED ORAL DAILY
Qty: 90 TABLET | Refills: 3 | Status: SHIPPED | OUTPATIENT
Start: 2024-01-17

## 2024-01-17 RX ORDER — LOSARTAN POTASSIUM 25 MG/1
12.5 TABLET ORAL DAILY
Qty: 45 TABLET | Refills: 3 | Status: SHIPPED | OUTPATIENT
Start: 2024-01-17

## 2024-01-17 RX ORDER — METFORMIN HCL 500 MG
1000 TABLET, EXTENDED RELEASE 24 HR ORAL
Qty: 180 TABLET | Refills: 1 | Status: SHIPPED | OUTPATIENT
Start: 2024-01-17

## 2024-01-17 ASSESSMENT — ASTHMA QUESTIONNAIRES
QUESTION_1 LAST FOUR WEEKS HOW MUCH OF THE TIME DID YOUR ASTHMA KEEP YOU FROM GETTING AS MUCH DONE AT WORK, SCHOOL OR AT HOME: NONE OF THE TIME
QUESTION_2 LAST FOUR WEEKS HOW OFTEN HAVE YOU HAD SHORTNESS OF BREATH: NOT AT ALL
QUESTION_4 LAST FOUR WEEKS HOW OFTEN HAVE YOU USED YOUR RESCUE INHALER OR NEBULIZER MEDICATION (SUCH AS ALBUTEROL): NOT AT ALL
ACT_TOTALSCORE: 24
ACT_TOTALSCORE: 24
QUESTION_3 LAST FOUR WEEKS HOW OFTEN DID YOUR ASTHMA SYMPTOMS (WHEEZING, COUGHING, SHORTNESS OF BREATH, CHEST TIGHTNESS OR PAIN) WAKE YOU UP AT NIGHT OR EARLIER THAN USUAL IN THE MORNING: NOT AT ALL
QUESTION_5 LAST FOUR WEEKS HOW WOULD YOU RATE YOUR ASTHMA CONTROL: WELL CONTROLLED

## 2024-01-17 NOTE — PROGRESS NOTES
Assessment & Plan     Type 2 diabetes mellitus without complication, without long-term current use of insulin (H)  Has been well controlled. Checked yesterday, at 6.1%. eye exam up to date. See me in 6 months.  - blood glucose (CONTOUR NEXT TEST) test strip; 1 strip by In Vitro route daily  - atorvastatin (LIPITOR) 20 MG tablet; Take 1 tablet (20 mg) by mouth daily  - losartan (COZAAR) 25 MG tablet; Take 0.5 tablets (12.5 mg) by mouth daily  - metFORMIN (GLUCOPHAGE XR) 500 MG 24 hr tablet; Take 2 tablets (1,000 mg) by mouth daily (with dinner)    Primary hypertension  Controlled. Will plan to continue on previous medication without changes     Hyperlipidemia LDL goal <100  On statin. Will plan to continue on previous medication without changes   - atorvastatin (LIPITOR) 20 MG tablet; Take 1 tablet (20 mg) by mouth daily    Screen for colon cancer  Due this year. Ordered, discussed.  - Colonoscopy Screening  Referral; Future    See me in the summer                Subjective   Karyn Hairston is a 63 year old, presenting for the following health issues:  Diabetes (5 months follow up; refills needed )      1/17/2024     4:15 PM   Additional Questions   Roomed by DEON Marcum   Accompanied by Self     History of Present Illness       Diabetes:   She presents for follow up of diabetes.  She is checking home blood glucose two times daily.   She checks blood glucose before and after meals.  Blood glucose is never over 200 and never under 70. She is aware of hypoglycemia symptoms including none.    She has no concerns regarding her diabetes at this time.   She is not experiencing numbness or burning in feet, excessive thirst, blurry vision, weight changes or redness, sores or blisters on feet.           She eats 2-3 servings of fruits and vegetables daily.She consumes 0 sweetened beverage(s) daily.She exercises with enough effort to increase her heart rate 20 to 29 minutes per day.  She exercises with enough effort  "to increase her heart rate 4 days per week.   She is taking medications regularly.                   Objective    /59 (BP Location: Left arm, Patient Position: Sitting, Cuff Size: Adult Regular)   Pulse 69   Temp 98  F (36.7  C) (Tympanic)   Resp 15   Ht 1.511 m (4' 11.5\")   Wt 56.7 kg (125 lb)   LMP  (LMP Unknown)   SpO2 98%   BMI 24.82 kg/m    Body mass index is 24.82 kg/m .    Physical Exam               Signed Electronically by: Deric Romeo MD    "

## 2024-01-19 PROBLEM — I10 PRIMARY HYPERTENSION: Status: ACTIVE | Noted: 2024-01-19

## 2024-01-31 ENCOUNTER — TELEPHONE (OUTPATIENT)
Dept: GASTROENTEROLOGY | Facility: CLINIC | Age: 64
End: 2024-01-31
Payer: COMMERCIAL

## 2024-01-31 ENCOUNTER — HOSPITAL ENCOUNTER (OUTPATIENT)
Facility: AMBULATORY SURGERY CENTER | Age: 64
End: 2024-01-31
Attending: COLON & RECTAL SURGERY | Admitting: FAMILY MEDICINE

## 2024-01-31 DIAGNOSIS — Z12.11 SPECIAL SCREENING FOR MALIGNANT NEOPLASMS, COLON: Primary | ICD-10-CM

## 2024-01-31 NOTE — TELEPHONE ENCOUNTER
"Endoscopy Scheduling Screen    Have you had a positive Covid test in the last 14 days?  No    Are you active on MyChart?   Yes    What insurance is in the chart?  Other:  BP    Ordering/Referring Provider:   ITZEL PERDOMO        (If ordering provider performs procedure, schedule with ordering provider unless otherwise instructed. )    BMI: Estimated body mass index is 24.82 kg/m  as calculated from the following:    Height as of 1/17/24: 1.511 m (4' 11.5\").    Weight as of 1/17/24: 56.7 kg (125 lb).     Sedation Ordered  moderate sedation.   If patient BMI > 50 do not schedule in ASC.    If patient BMI > 45 [facility] not [facility].    Are you taking methadone or Suboxone?  No    Have you had difficulties, pain, or discomfort during past endoscopy procedures?  No    Are you taking any prescription medications for pain 3 or more times per week?   NO - No RN review required.    Do you have a history of malignant hyperthermia or adverse reaction to anesthesia?  No    (Females) Are you currently pregnant?   No     Have you been diagnosed or told you have pulmonary hypertension?   No    Do you have an LVAD?  No    Have you been told you have moderate to severe sleep apnea?  Yes (RN Review required for scheduling unless scheduling in Hospital.)    Have you been told you have COPD, asthma, or any other lung disease?  No    Do you have any heart conditions?  No     Have you ever had an organ transplant?   No    Have you ever had or are you awaiting a heart or lung transplant?   No    Have you had a stroke or transient ischemic attack (TIA aka \"mini stroke\" in the last 6 months?   No    Have you been diagnosed with or been told you have cirrhosis of the liver?   No    Are you currently on dialysis?   No    Do you need assistance transferring?   No    BMI: Estimated body mass index is 24.82 kg/m  as calculated from the following:    Height as of 1/17/24: 1.511 m (4' 11.5\").    Weight as of 1/17/24: 56.7 kg (125 lb). "     Is patients BMI > 40 and scheduling location [facility]?  No    Do you take an injectable medication for weight loss or diabetes (excluding insulin)?  No    Do you take the medication Naltrexone?  No    Do you take blood thinners?  No       Prep   Are you currently on dialysis or do you have chronic kidney disease?  No    Do you have a diagnosis of diabetes?  Yes (Golytely Prep)    Do you have a diagnosis of cystic fibrosis (CF)?  No    On a regular basis do you go 3 -5 days between bowel movements?  No    BMI > 40?  No    Preferred Pharmacy:        [facility] [id number] [address] [address]  Phone: [phone] Fax: [phone]          Final Scheduling Details   Colonoscopy prep sent?  N/A    Procedure scheduled  Colonoscopy    Surgeon:  Kristin     Date of procedure:  4/19     Pre-OP / PAC:   No - Not required for this site.    Location  [facility] - [facility] - Patient preference.    Sedation   Moderate Sedation - Per order.      Patient Reminders:   You will receive a call from a Nurse to review instructions and health history.  This assessment must be completed prior to your procedure.  Failure to complete the Nurse assessment may result in the procedure being cancelled.      On the day of your procedure, please designate an adult(s) who can drive you home stay with you for the next 24 hours. The medicines used in the exam will make you sleepy. You will not be able to drive.      You cannot take public transportation, ride share services, or non-medical taxi service without a responsible caregiver.  Medical transport services are allowed with the requirement that a responsible caregiver will receive you at your destination.  We require that drivers and caregivers are confirmed prior to your procedure.

## 2024-01-31 NOTE — TELEPHONE ENCOUNTER
"Pre Assessment RN Review    Focused Assessments      KASSIDY Hx  Estimated body mass index is 24.82 kg/m  as calculated from the following:    Height as of 1/17/24: 1.511 m (4' 11.5\").    Weight as of 1/17/24: 56.7 kg (125 lb).     Patient has reported / documented history KASSIDY and reports she does use a a device for sleep.     Device: CPAP    Severity Assessment    Sleep Study: 5/2022  Diagnosis/Severity: Mild    AHI: 12.5          Scheduling Status & Recommendations    Location Type: No Scheduling Restrictions - Per exclusion criteria.     Kaur Jackson, RN  Endoscopy Procedure Pre Assessment RN   "

## 2024-01-31 NOTE — TELEPHONE ENCOUNTER
Requested date change per [employer].     Final Scheduling Details   Colonoscopy prep sent?  N/A    Procedure scheduled  Colonoscopy    Surgeon:  SUMAN     Date of procedure:  4/3/2024     Pre-OP / PAC:   No - Not required for this site.    Location  [facility] - [facility] - Patient preference.    Sedation   Moderate Sedation - Per order.      Patient Reminders:   You will receive a call from a Nurse to review instructions and health history.  This assessment must be completed prior to your procedure.  Failure to complete the Nurse assessment may result in the procedure being cancelled.      On the day of your procedure, please designate an adult(s) who can drive you home stay with you for the next 24 hours. The medicines used in the exam will make you sleepy. You will not be able to drive.      You cannot take public transportation, ride share services, or non-medical taxi service without a responsible caregiver.  Medical transport services are allowed with the requirement that a responsible caregiver will receive you at your destination.  We require that drivers and caregivers are confirmed prior to your procedure.

## 2024-02-20 ENCOUNTER — TELEPHONE (OUTPATIENT)
Dept: GASTROENTEROLOGY | Facility: CLINIC | Age: 64
End: 2024-02-20
Payer: COMMERCIAL

## 2024-02-20 NOTE — TELEPHONE ENCOUNTER
Caller: Karyn Ly     Reason for Reschedule/Cancellation   (please be detailed, any staff messages or encounters to note?): conflict/didn't say why      Prior to reschedule please review:  Ordering Provider: Deric michaels  Sedation Determined: moderate  Does patient have any ASC Exclusions, please identify?: n      Notes on Cancelled Procedure:  Procedure: Lower Endoscopy [Colonoscopy]   Date: 4/3  Location: [facility]; [address], [place] [address]   Surgeon: Ace      Rescheduled: Yes,   Procedure: Lower Endoscopy [Colonoscopy]    Date: 4/16   Location: [facility]; [address], [place] [address]    Surgeon: Dc   Sedation Level Scheduled  moderat,  Reason for Sedation Level ordered   Prep/Instructions updated and sent: y       Did you cancel or rescheduled an EUS procedure? No.

## 2024-03-13 NOTE — TELEPHONE ENCOUNTER
"CC: painful labial lesion    HPI:  Julia Fernández is a 69 y.o. female  presents with complaint of painful labial lesion. Third time this has happened, felt bump left posterior labia. Today it is painful but the size/swelling is going down. No bleeding or discharge from the bump. Has been applying the clobetasol as prescribed. Pain better with cold compression. Patient also has shingles outbreak in right groin at top of her thigh, was stressed last week after completing a colonscopy. The rash is improving with calamine lotion and pain is minimal now.     ROS:  GENERAL: No fever, chills, fatigability or weight loss.  VULVAR: see HPI  VAGINAL: No relaxation, no itching, no discharge, no abnormal bleeding and no lesions.  ABDOMEN: No abdominal pain. Denies nausea. Denies vomiting. No diarrhea. No constipation  BREAST: Denies pain. No lumps. No discharge.  URINARY: No incontinence, no nocturia, no frequency and no dysuria.  CARDIOVASCULAR: No chest pain. No shortness of breath. No leg cramps.  NEUROLOGICAL: No headaches. No vision changes.      Patient History:  Past Medical History:   Diagnosis Date    Anxiety     Arthritis     Chronic obstructive pulmonary disease 2020    Clotting disorder     Lupus anticoagulant    GERD (gastroesophageal reflux disease)     High cholesterol     High triglycerides     Lupus     "Lupus anticoagulant"    Mitral valve prolapse     Osteoporosis     Pancreatitis     Polio     9 month old with right leg discrepency and weakness    Scoliosis      Past Surgical History:   Procedure Laterality Date    ADENOIDECTOMY      APPENDECTOMY       SECTION      x1    CHOLECYSTECTOMY      COLONOSCOPY      COLONOSCOPY N/A 3/7/2024    Procedure: COLONOSCOPY;  Surgeon: Lakisha Angel MD;  Location: [facility];  Service: Endoscopy;  Laterality: N/A;    ESOPHAGOGASTRODUODENOSCOPY N/A 2020    Procedure: EGD (ESOPHAGOGASTRODUODENOSCOPY);  Surgeon: Lakisha Angel MD;  Location: " Forms received from: [facility]   Phone number listed: [phone]   Fax listed: [phone]  Date received: 02/07/2018  Form description: diabetic supplies, lidocaine ointment  Once forms are completed, please return to [facility] via fax.  Is patient requesting to be contacted when forms are completed: NA    Form placed: In provider's basket  Valentina Cm     "[facility] END[facility];  Service: Endoscopy;  Laterality: N/A;    EYE SURGERY Right     lower eyelid ("growth removed")    HYSTERECTOMY      endometriosis    TONSILLECTOMY      TUBAL LIGATION       Social History     Tobacco Use    Smoking status: Former     Current packs/day: 0.00     Average packs/day: 1 pack/day for 29.0 years (29.0 ttl pk-yrs)     Types: Cigarettes     Start date:      Quit date:      Years since quittin.2     Passive exposure: Past    Smokeless tobacco: Never   Substance Use Topics    Alcohol use: No    Drug use: No     Family History   Problem Relation Age of Onset    Arthritis Mother     Diabetes Mother     Hearing loss Mother     Vision loss Mother     COPD Mother     Depression Mother     Vision loss Father     Cancer Father         Pituitary tumor    Heart disease Father         CABG    Diabetes Sister     Hyperlipidemia Sister     Hypertension Sister     Hearing loss Sister     Drug abuse Brother     Alcohol abuse Brother     Early death Brother     Hypertension Brother     No Known Problems Son      OB History    Para Term  AB Living   1 1 1         SAB IAB Ectopic Multiple Live Births                  # Outcome Date GA Lbr Heriberto/2nd Weight Sex Delivery Anes PTL Lv   1 Term                Objective:   BP (!) 148/87   Wt 54.3 kg (119 lb 11.4 oz)   BMI 22.62 kg/m²   No LMP recorded. Patient has had a hysterectomy.      PHYSICAL EXAM:  APPEARANCE: Well nourished, well developed, in no acute distress.  AFFECT: WNL, alert and oriented x 3  SKIN: No acne or hirsutism. Right groin/thigh raised papular rash approx 2x4cm, dry lesions no drainage or crusting   NECK: Neck symmetric without masses or thyromegaly  CHEST: Good respiratory effect  PELVIC: Normal appearing postmenopausal atrophy of labia, vagina normal. White skin changes bilaterally inner labia minor to posterior perineum, previous extending bilaterally to rectum now improved. Left posterior labia at perineum with " small ulcerative lesion, no bleeding, no swelling or mass behind, tender to palpation.    EXTREMITIES: No edema.      ASSESSMENT and PLAN:    ICD-10-CM ICD-9-CM    1. Labial lesion  N90.89 624.8       2. Thigh shingles  B02.9 053.9           Labial lesion   - patient reports no known history of HSV, discussed possible due to pain/type of lesion present or it can be simple break in the skin from tissue trauma. Discussed option to use valtrex to treat current shingles outbreak and help if this is HSV lesion, patient does not desire to proceed.   - will continue topical treatment, cold compress and monitoring. If recurs again, plan to biopsy area to ensure not precancerous or cancerous tissue   - lichen sclerosus improved, will continue topical clobetasol       Follow up: as needed if symptoms worsen or if recurs to complete biopsy       Stephanie Heaney, MD OBGYN [facility]

## 2024-03-29 RX ORDER — BISACODYL 5 MG/1
TABLET, DELAYED RELEASE ORAL
Qty: 4 TABLET | Refills: 0 | Status: SHIPPED | OUTPATIENT
Start: 2024-03-29 | End: 2024-06-24

## 2024-04-02 ENCOUNTER — TELEPHONE (OUTPATIENT)
Dept: GASTROENTEROLOGY | Facility: CLINIC | Age: 64
End: 2024-04-02

## 2024-04-02 NOTE — TELEPHONE ENCOUNTER
Pre visit planning completed.      Procedure details:    Patient scheduled for Colonoscopy  on 4/16/2024.     Arrival time: 0830. Procedure time 0915    Facility location: [facility]; [address], [place] [address]. Check in location: 2nd Floor at Surgery desk.    Sedation type: Conscious sedation     Pre op exam needed? N/A    Indication for procedure: Screening       Chart review:     Electronic implanted devices? No    Recent diagnosis of diverticulitis within the last 6 weeks? No    Diabetic? Yes. Diabetic medication HOLDING recommendations: Oral diabetic medications: Yes:  Metformin (glucophage): HOLD day of procedure.       Medication review:    Anticoagulants? No    NSAIDS? No    Other medication HOLDING recommendations:  N/A      Prep for procedure:     Bowel prep recommendation: Standard Golytely. Bowel prep prescription sent by CRC nurse to    [facility] [address] [place] [address]  Due to: diabetes.     Prep instructions sent via Saluspotmaribell Mckeon RN  Endoscopy Procedure Pre Assessment RN  [phone] option 4

## 2024-04-03 NOTE — TELEPHONE ENCOUNTER
Pre assessment completed for upcoming procedure.   (Please see previous telephone encounter notes for complete details)      Procedure details:    Arrival time and facility location reviewed.    Pre op exam needed? N/A    Designated  policy reviewed. Instructed to have someone stay 6  hours post procedure.       Medication review:    Medications reviewed. Please see supporting documentation below. Holding recommendations discussed (if applicable).       Prep for procedure:     Procedure prep instructions reviewed.        Any additional information needed:  N/A      Patient  verbalized understanding and had no questions or concerns at this time.      Afshan Bryson RN  Endoscopy Procedure Pre Assessment RN  [phone] option 4

## 2024-04-05 ENCOUNTER — DOCUMENTATION ONLY (OUTPATIENT)
Dept: INTERNAL MEDICINE | Facility: CLINIC | Age: 64
End: 2024-04-05

## 2024-04-05 DIAGNOSIS — Z00.00 LABORATORY EXAMINATION ORDERED AS PART OF A ROUTINE GENERAL MEDICAL EXAMINATION: Primary | ICD-10-CM

## 2024-04-05 NOTE — PROGRESS NOTES
Appointment is not with me. Is she establishing care elsewhere now?    Deric Romeo MD on 4/5/2024 at 10:21 AM

## 2024-04-05 NOTE — PROGRESS NOTES
[phone]- Attempted to call pt, no answer (1/3). Left message requesting pt to call back clinic. See recent PCP note for this encounter.

## 2024-04-09 ENCOUNTER — TELEPHONE (OUTPATIENT)
Dept: GASTROENTEROLOGY | Facility: CLINIC | Age: 64
End: 2024-04-09

## 2024-04-09 NOTE — TELEPHONE ENCOUNTER
Caller: Karyn Hairston    Reason for Reschedule/Cancellation   (please be detailed, any staff messages or encounters to note?): Patient does not have active insurance      Prior to reschedule please review:  Ordering Provider: Ousmane Guerrero Determined: CS  Does patient have any ASC Exclusions, please identify?: N      Notes on Cancelled Procedure:  Procedure: Lower Endoscopy [Colonoscopy]   Date: 4/16/24  Location: [facility]; [address], [place] [address]   Surgeon: Dc      Rescheduled: No, Patient will call back to reschedule  when her insurance is active       Did you cancel or rescheduled an EUS procedure? No.

## 2024-04-10 NOTE — PROGRESS NOTES
Attempted to contact patient (2/3) to gather more information. No answer. Left message to call clinic.

## 2024-04-19 ENCOUNTER — PATIENT OUTREACH (OUTPATIENT)
Dept: CARE COORDINATION | Facility: CLINIC | Age: 64
End: 2024-04-19

## 2024-04-27 ENCOUNTER — HEALTH MAINTENANCE LETTER (OUTPATIENT)
Age: 64
End: 2024-04-27

## 2024-05-03 ENCOUNTER — PATIENT OUTREACH (OUTPATIENT)
Dept: CARE COORDINATION | Facility: CLINIC | Age: 64
End: 2024-05-03
Payer: COMMERCIAL

## 2024-05-15 ENCOUNTER — LAB (OUTPATIENT)
Dept: LAB | Facility: CLINIC | Age: 64
End: 2024-05-15
Payer: COMMERCIAL

## 2024-05-15 ENCOUNTER — ANCILLARY PROCEDURE (OUTPATIENT)
Dept: MAMMOGRAPHY | Facility: CLINIC | Age: 64
End: 2024-05-15
Attending: INTERNAL MEDICINE
Payer: COMMERCIAL

## 2024-05-15 DIAGNOSIS — Z00.00 LABORATORY EXAMINATION ORDERED AS PART OF A ROUTINE GENERAL MEDICAL EXAMINATION: ICD-10-CM

## 2024-05-15 DIAGNOSIS — Z12.31 VISIT FOR SCREENING MAMMOGRAM: ICD-10-CM

## 2024-05-15 LAB
ALBUMIN SERPL BCG-MCNC: 4.4 G/DL (ref 3.5–5.2)
ALP SERPL-CCNC: 74 U/L (ref 40–150)
ALT SERPL W P-5'-P-CCNC: 20 U/L (ref 0–50)
ANION GAP SERPL CALCULATED.3IONS-SCNC: 11 MMOL/L (ref 7–15)
AST SERPL W P-5'-P-CCNC: 21 U/L (ref 0–45)
BILIRUB SERPL-MCNC: 0.4 MG/DL
BUN SERPL-MCNC: 13.7 MG/DL (ref 8–23)
CALCIUM SERPL-MCNC: 9.2 MG/DL (ref 8.8–10.2)
CHLORIDE SERPL-SCNC: 104 MMOL/L (ref 98–107)
CHOLEST SERPL-MCNC: 148 MG/DL
CREAT SERPL-MCNC: 0.68 MG/DL (ref 0.51–0.95)
CREAT UR-MCNC: 135 MG/DL
DEPRECATED HCO3 PLAS-SCNC: 26 MMOL/L (ref 22–29)
EGFRCR SERPLBLD CKD-EPI 2021: >90 ML/MIN/1.73M2
FASTING STATUS PATIENT QL REPORTED: YES
FASTING STATUS PATIENT QL REPORTED: YES
GLUCOSE SERPL-MCNC: 104 MG/DL (ref 70–99)
HBA1C MFR BLD: 6.2 % (ref 0–5.6)
HDLC SERPL-MCNC: 55 MG/DL
LDLC SERPL CALC-MCNC: 75 MG/DL
MICROALBUMIN UR-MCNC: 14.2 MG/L
MICROALBUMIN/CREAT UR: 10.52 MG/G CR (ref 0–25)
NONHDLC SERPL-MCNC: 93 MG/DL
POTASSIUM SERPL-SCNC: 4.4 MMOL/L (ref 3.4–5.3)
PROT SERPL-MCNC: 7.5 G/DL (ref 6.4–8.3)
SODIUM SERPL-SCNC: 141 MMOL/L (ref 135–145)
TRIGL SERPL-MCNC: 92 MG/DL

## 2024-05-15 PROCEDURE — 36415 COLL VENOUS BLD VENIPUNCTURE: CPT

## 2024-05-15 PROCEDURE — 80053 COMPREHEN METABOLIC PANEL: CPT

## 2024-05-15 PROCEDURE — 82043 UR ALBUMIN QUANTITATIVE: CPT

## 2024-05-15 PROCEDURE — 80061 LIPID PANEL: CPT

## 2024-05-15 PROCEDURE — 83036 HEMOGLOBIN GLYCOSYLATED A1C: CPT

## 2024-05-15 PROCEDURE — 82570 ASSAY OF URINE CREATININE: CPT

## 2024-05-15 PROCEDURE — 77067 SCR MAMMO BI INCL CAD: CPT | Mod: TC | Performed by: RADIOLOGY

## 2024-05-29 PROBLEM — I10 PRIMARY HYPERTENSION: Chronic | Status: ACTIVE | Noted: 2024-01-19

## 2024-05-29 PROBLEM — G47.33 OSA (OBSTRUCTIVE SLEEP APNEA): Chronic | Status: ACTIVE | Noted: 2022-07-24

## 2024-05-30 ENCOUNTER — OFFICE VISIT (OUTPATIENT)
Dept: SLEEP MEDICINE | Facility: CLINIC | Age: 64
End: 2024-05-30
Payer: COMMERCIAL

## 2024-05-30 ENCOUNTER — MYC MEDICAL ADVICE (OUTPATIENT)
Dept: SLEEP MEDICINE | Facility: CLINIC | Age: 64
End: 2024-05-30

## 2024-05-30 VITALS — RESPIRATION RATE: 12 BRPM | WEIGHT: 130 LBS | BODY MASS INDEX: 26.21 KG/M2 | HEIGHT: 59 IN

## 2024-05-30 DIAGNOSIS — G47.33 OSA (OBSTRUCTIVE SLEEP APNEA): Primary | Chronic | ICD-10-CM

## 2024-05-30 PROCEDURE — 99214 OFFICE O/P EST MOD 30 MIN: CPT | Performed by: INTERNAL MEDICINE

## 2024-05-30 ASSESSMENT — SLEEP AND FATIGUE QUESTIONNAIRES
HOW LIKELY ARE YOU TO NOD OFF OR FALL ASLEEP WHILE SITTING QUIETLY AFTER LUNCH WITHOUT ALCOHOL: SLIGHT CHANCE OF DOZING
HOW LIKELY ARE YOU TO NOD OFF OR FALL ASLEEP WHILE SITTING INACTIVE IN A PUBLIC PLACE: WOULD NEVER DOZE
HOW LIKELY ARE YOU TO NOD OFF OR FALL ASLEEP WHILE SITTING AND READING: SLIGHT CHANCE OF DOZING
HOW LIKELY ARE YOU TO NOD OFF OR FALL ASLEEP WHILE LYING DOWN TO REST IN THE AFTERNOON WHEN CIRCUMSTANCES PERMIT: SLIGHT CHANCE OF DOZING
HOW LIKELY ARE YOU TO NOD OFF OR FALL ASLEEP IN A CAR, WHILE STOPPED FOR A FEW MINUTES IN TRAFFIC: WOULD NEVER DOZE
HOW LIKELY ARE YOU TO NOD OFF OR FALL ASLEEP WHILE WATCHING TV: SLIGHT CHANCE OF DOZING
HOW LIKELY ARE YOU TO NOD OFF OR FALL ASLEEP WHEN YOU ARE A PASSENGER IN A CAR FOR AN HOUR WITHOUT A BREAK: SLIGHT CHANCE OF DOZING
HOW LIKELY ARE YOU TO NOD OFF OR FALL ASLEEP WHILE SITTING AND TALKING TO SOMEONE: WOULD NEVER DOZE

## 2024-05-30 NOTE — NURSING NOTE
"Chief Complaint   Patient presents with    CPAP Follow Up       Initial Resp 12   Ht 1.499 m (4' 11\")   Wt 59 kg (130 lb)   BMI 26.26 kg/m   Estimated body mass index is 26.26 kg/m  as calculated from the following:    Height as of this encounter: 1.499 m (4' 11\").    Weight as of this encounter: 59 kg (130 lb).    Medication Reconciliation: complete  ESS: 5  Neck circumference: 13.75 inches / 35 centimeters.  DME: DWAIN Starr CMA      "

## 2024-05-30 NOTE — PROGRESS NOTES
Sleep Apnea - Follow-up Visit:    Impression/Plan:     Mild supine sleep apnea with hypoxemia.   Comorbid hypertension, diabetes mellitus   Tolerating PAP well.  Benefiting from treatment. Excessive daytime sleepiness improved with PAP   - Continue current treatments.   - See DME about cloth mask, hypoallergenic mask  - Try using OTC hydrocortisone cream for rash        Karyn Ly will follow up in about 2 year(s).     I spent 10 minutes with patient including counseling, and 10 minutes with chart review, and documentation         History of Present Illness:  Chief Complaint   Patient presents with    CPAP Follow Up       Karyn Ly presents for follow-up of their mild sleep apnea, managed with CPAP.     DME [facility]     Patient was initially seen by Dr. Osei in 2021 with snoring, witnessed apneas, gasp arousals, daytime sleepiness (ESS 9),  sleep related headaches    Polysomnogram 5/3/22 (139#)  - AHI 12. REM AHI 56, supine AHI 16, non-supine AHI 4.8  - Low O2 72%. SaO2 <= 88% for 11 minutes    Patient Karyn Ly was set up at [place] [facility] on August 24, 2022. Patient received a Resmed Airsense 11 Pressures were set at 5-15 cm H2O.    Do you use a CPAP Machine at home: Yes  Overall, on a scale of 0-10 how would you rate your CPAP (0 poor, 10 great): 10    Her supplies are old, mask is leaking   Straps need to be tight   Has morning headaches 2 times     What type of mask do you use: Full Face Mask  Is your mask comfortable: No  If not, why: I developed some rashes    Is your mask leaking: Yes  If yes, where do you feel it: nose  bridge    Do you notice snoring with mask on: No  Do you notice gasping arousals with mask on: No  Are you having significant oral or nasal dryness: No    What is your typical bedtime: 1030  How long does it take you to go to sleep on PAP therapy: 15min  What time do you typically get out of bed for the day: 7-7.:30  How many hours on average per  night are you using PAP therapy: 7-8hrs  How many hours are you sleeping per night: 7-8hrs  Do you feel well rested in the morning: Yes      ResMed   Auto-PAP 6.0 - 13.0 cmH2O 30 day usage data:    100% of days with > 4 hours of use. 0/30 days with no use.   Average use 8' 20 minutes per day.   95%ile Leak 13.24 L/min.   CPAP 95% pressure 12.1 cm. Median 8  AHI 2.06 events per hour.       EPWORTH SLEEPINESS SCALE         5/30/2024     2:58 PM    [place] Sleepiness Scale ( PANCHITO Lechuga  3846-9259<br>ESS - [place]/English - Final version - 21 Nov 07 - [facility] [place].)   [place] Score (Sleep) 5       INSOMNIA SEVERITY INDEX (SIDDHARTH)          5/30/2024     2:58 PM   Insomnia Severity Index (SIDDHARTH)   Difficulty falling asleep 2   Difficulty staying asleep 2   Problems waking up too early 2   How SATISFIED/DISSATISFIED are you with your CURRENT sleep pattern? 1   How NOTICEABLE to others do you think your sleep problem is in terms of impairing the quality of your life? 4   How WORRIED/DISTRESSED are you about your current sleep problem? 3   To what extent do you consider your sleep problem to INTERFERE with your daily functioning (e.g. daytime fatigue, mood, ability to function at work/daily chores, concentration, memory, mood, etc.) CURRENTLY? 2   SIDDHARTH Total Score 16       Guidelines for Scoring/Interpretation:  Total score categories:  0-7 = No clinically significant insomnia   8-14 = Subthreshold insomnia   15-21 = Clinical insomnia (moderate severity)  22-28 = Clinical insomnia (severe)  Used via courtesy of www.ActivePathealth.va.gov with permission from Wang Martinez PhD., [facility]        Past medical/surgical history, family history, social history, medications and allergies were reviewed.        Problem List:  Patient Active Problem List    Diagnosis Date Noted    Primary hypertension 01/19/2024     Priority: Medium    KASSIDY (obstructive sleep apnea) - mild (AHI 12) 07/24/2022     Priority: Medium       "Polysomnogram 5/3/22 (139#) - AHI 12. REM AHI 56, supine AHI 16, non-supine AHI 4.8. Low O2 72%. SaO2 <= 88% for 11 minutes      Type 2 diabetes mellitus without complication, without long-term current use of insulin (H) 11/30/2017     Priority: Medium    Mild intermittent asthma 04/21/2015     Priority: Medium    Hyperlipidemia LDL goal <100 08/12/2014     Priority: Medium    Combined forms of age-related cataract, mild, of both eyes 05/28/2019     Priority: Low    Shellfish allergy 07/31/2018     Priority: Low     Epi pen      Epiretinal membrane, mild, od > os 04/19/2018     Priority: Low        Resp 12   Ht 1.499 m (4' 11\")   Wt 59 kg (130 lb)   BMI 26.26 kg/m      "

## 2024-05-30 NOTE — NURSING NOTE
DME orders have been automatically faxed to [facility]. 1 year appointment reminder will be sent via My Chart.   Etta Starr CMA

## 2024-06-10 ENCOUNTER — NURSE TRIAGE (OUTPATIENT)
Dept: FAMILY MEDICINE | Facility: CLINIC | Age: 64
End: 2024-06-10
Payer: COMMERCIAL

## 2024-06-10 NOTE — TELEPHONE ENCOUNTER
"Patient has a plugged ear, she has done the home drops and still has not cleared. She has had ear flushes in the past but has not been seen recently to have a flush done on the MA schedule. She said she cannot wait until her visit ion 6/24/24 as this is very bothersome, she denies pain but reports it is not comfortable. Advised patient a visit is needed, scheduled at [facility] per patient request.       Flor Sweeney RN   Reason for Disposition   Ear congestion present > 48 hours    Additional Information   Negative: Ear pain is main symptom   Negative: Hearing loss (complete or partial) is main symptom   Negative: Earwax is main concern   Negative: Has nasal allergies and they are acting up   Negative: Earache lasts > 1 hour   Negative: Pus or cloudy discharge from ear canal   Negative: Patient wants to be seen    Answer Assessment - Initial Assessment Questions  1. LOCATION: \"Which ear is involved?\"        Right ear  2. SENSATION: \"Describe how the ear feels.\" (e.g. stuffy, full, plugged).\"       plugged  3. ONSET:  \"When did the ear symptoms start?\"        Over a week ago   4. PAIN: \"Do you also have an earache?\" If Yes, ask: \"How bad is it?\" (Scale 1-10; or mild, moderate, severe)      No ache but is uncomfortable   5. CAUSE: \"What do you think is causing the ear congestion?\"      Wax, needing to be flushed  6. URI: \"Do you have a runny nose or cough?\"       no  7. NASAL ALLERGIES: \"Are there symptoms of hay fever, such as sneezing or a clear nasal discharge?\"      no  8. PREGNANCY: \"Is there any chance you are pregnant?\" \"When was your last menstrual period?\"      NA    Protocols used: Ear - Congestion-A-OH    "

## 2024-06-11 ENCOUNTER — OFFICE VISIT (OUTPATIENT)
Dept: FAMILY MEDICINE | Facility: CLINIC | Age: 64
End: 2024-06-11
Payer: COMMERCIAL

## 2024-06-11 VITALS
HEART RATE: 74 BPM | TEMPERATURE: 97.6 F | WEIGHT: 131.4 LBS | DIASTOLIC BLOOD PRESSURE: 66 MMHG | RESPIRATION RATE: 16 BRPM | BODY MASS INDEX: 26.54 KG/M2 | OXYGEN SATURATION: 98 % | SYSTOLIC BLOOD PRESSURE: 101 MMHG

## 2024-06-11 DIAGNOSIS — H61.21 IMPACTED CERUMEN OF RIGHT EAR: Primary | ICD-10-CM

## 2024-06-11 PROCEDURE — 69209 REMOVE IMPACTED EAR WAX UNI: CPT | Mod: RT

## 2024-06-11 NOTE — PROGRESS NOTES
"  Assessment & Plan     Impacted cerumen of right ear  Wax removed from right ear. Patient reports improvement to hearing and decreased discomfort.   - VT REMOVAL IMPACTED CERUMEN IRRIGATION/LVG UNILAT    BMI  Estimated body mass index is 26.54 kg/m  as calculated from the following:    Height as of 5/30/24: 1.499 m (4' 11\").    Weight as of this encounter: 59.6 kg (131 lb 6.4 oz).         Subjective   Karyn Hairston is a 63 year old, presenting for the following health issues:  Ear Problem (Plugged rt ear)      6/11/2024     2:30 PM   Additional Questions   Roomed by juliann hermosillo     Ear Problem    History of Present Illness       Reason for visit:  Ear wax flushing    She eats 2-3 servings of fruits and vegetables daily.She consumes 0 sweetened beverage(s) daily.She exercises with enough effort to increase her heart rate 30 to 60 minutes per day.  She exercises with enough effort to increase her heart rate 6 days per week. She is missing 1 dose(s) of medications per week.         Concern - plugged rt ear  Onset: almost 11 days  Description:   Intensity: severe  Progression of Symptoms:  worsening  Accompanying Signs & Symptoms: plugged ear, feels like water in ear, sometimes a popping sound and can hear again, but then closes again  Previous history of similar problem: yes    Precipitating factors:        Worsened by:   Alleviating factors:        Improved by:   Therapies tried and outcome: OTC ear drops- not helpful    June 1st, right ear plugged having a hard time hearing. Has tried over the counter ear drops. Has had ears cleaned out in the past but not recently.           Objective    /66 (BP Location: Left arm, Patient Position: Sitting, Cuff Size: Adult Regular)   Pulse 74   Temp 97.6  F (36.4  C) (Temporal)   Resp 16   Wt 59.6 kg (131 lb 6.4 oz)   LMP  (LMP Unknown)   SpO2 98%   BMI 26.54 kg/m    Body mass index is 26.54 kg/m .  Physical Exam   GENERAL: alert and no distress  HENT: right ear: " occluded with wax and left ear: normal: no effusions, no erythema, normal landmarks            Signed Electronically by: SLIME Argueta CNP

## 2024-06-11 NOTE — PROGRESS NOTES
Patient identified using two patient identifiers.  Ear exam showing wax occlusion completed by provider.  Solution: warm water was placed in the right ear(s) via irrigation tool: elephant ear.  Reena Guevara MA on 6/11/2024 at 3:14 PM

## 2024-06-24 ENCOUNTER — OFFICE VISIT (OUTPATIENT)
Dept: FAMILY MEDICINE | Facility: CLINIC | Age: 64
End: 2024-06-24
Payer: COMMERCIAL

## 2024-06-24 VITALS
DIASTOLIC BLOOD PRESSURE: 69 MMHG | HEART RATE: 69 BPM | HEIGHT: 59 IN | BODY MASS INDEX: 26 KG/M2 | TEMPERATURE: 97.7 F | SYSTOLIC BLOOD PRESSURE: 109 MMHG | RESPIRATION RATE: 18 BRPM | WEIGHT: 129 LBS | OXYGEN SATURATION: 98 %

## 2024-06-24 DIAGNOSIS — Z12.11 SCREEN FOR COLON CANCER: Primary | ICD-10-CM

## 2024-06-24 DIAGNOSIS — E11.9 TYPE 2 DIABETES MELLITUS WITHOUT COMPLICATION, WITHOUT LONG-TERM CURRENT USE OF INSULIN (H): Chronic | ICD-10-CM

## 2024-06-24 PROCEDURE — 99396 PREV VISIT EST AGE 40-64: CPT | Performed by: INTERNAL MEDICINE

## 2024-06-24 SDOH — HEALTH STABILITY: PHYSICAL HEALTH: ON AVERAGE, HOW MANY DAYS PER WEEK DO YOU ENGAGE IN MODERATE TO STRENUOUS EXERCISE (LIKE A BRISK WALK)?: 5 DAYS

## 2024-06-24 ASSESSMENT — SOCIAL DETERMINANTS OF HEALTH (SDOH): HOW OFTEN DO YOU GET TOGETHER WITH FRIENDS OR RELATIVES?: TWICE A WEEK

## 2024-06-24 NOTE — PROGRESS NOTES
"Preventive Care Visit  [facility] DONTRELL Gresham MD, Internal Medicine - Pediatrics  Jun 24, 2024      Assessment & Plan   Problem List Items Addressed This Visit       Type 2 diabetes mellitus without complication, without long-term current use of insulin (H) (Chronic)    Relevant Orders    Adult Eye  Referral     Other Visit Diagnoses       Screen for colon cancer    -  Primary    Relevant Orders    Colonoscopy Screening  Referral           BP     109/69  6/25/2024    Lab Results   Component Value Date     05/15/2024     01/21/2022     01/08/2021     Lab Results   Component Value Date    A1C 6.2 05/15/2024    A1C 7.3 01/08/2021     Lab Results   Component Value Date    LDL 75 05/15/2024    LDL 95 01/08/2021     Lab Results   Component Value Date    MICROL 14.2 05/15/2024    MICROL 14 01/21/2022    MICROL 11 01/08/2021     No results found for: \"MICROALBUMIN\"           BMI  Estimated body mass index is 26.05 kg/m  as calculated from the following:    Height as of this encounter: 1.499 m (4' 11\").    Weight as of this encounter: 58.5 kg (129 lb).   Weight management plan: Discussed healthy diet and exercise guidelines    Counseling  Appropriate preventive services were discussed with this patient, including applicable screening as appropriate for fall prevention, nutrition, physical activity, Tobacco-use cessation, weight loss and cognition.  Checklist reviewing preventive services available has been given to the patient.  Reviewed patient's diet, addressing concerns and/or questions.     Work on weight loss  Regular exercise      Subjective   Karyn Hairston is a 63 year old, presenting for the following:  Physical        6/24/2024     9:02 AM   Additional Questions   Roomed by Dee        Health Care Directive  Patient does not have a Health Care Directive or Living Will: Discussed advance care planning with patient; however, patient declined at this " time.    HPI              6/24/2024   General Health   How would you rate your overall physical health? Good   Feel stress (tense, anxious, or unable to sleep) Not at all            6/24/2024   Nutrition   Three or more servings of calcium each day? Yes   Diet: Regular (no restrictions)   How many servings of fruit and vegetables per day? (!) 0-1   How many sweetened beverages each day? 0-1            6/24/2024   Exercise   Days per week of moderate/strenous exercise 5 days            6/24/2024   Social Factors   Frequency of gathering with friends or relatives Twice a week   Worry food won't last until get money to buy more No   Food not last or not have enough money for food? No   Do you have housing? (Housing is defined as stable permanent housing and does not include staying ouside in a car, in a tent, in an abandoned building, in an overnight shelter, or couch-surfing.) Yes   Are you worried about losing your housing? No   Lack of transportation? No   Unable to get utilities (heat,electricity)? No            6/24/2024   Fall Risk   Fallen 2 or more times in the past year? No   Trouble with walking or balance? No             6/24/2024   Dental   Dentist two times every year? Yes            6/24/2024   TB Screening   Were you born outside of the [place]? Yes              Today's PHQ-2 Score:       1/17/2024     4:14 PM   PHQ-2 ( 1999 [employer])   Q1: Little interest or pleasure in doing things 0   Q2: Feeling down, depressed or hopeless 0   PHQ-2 Score 0   Q1: Little interest or pleasure in doing things Not at all   Q2: Feeling down, depressed or hopeless Not at all   PHQ-2 Score 0         6/24/2024   Substance Use   Alcohol more than 3/day or more than 7/wk No   Do you use any other substances recreationally? No        Social History     Tobacco Use    Smoking status: Never     Passive exposure: Never    Smokeless tobacco: Never   Vaping Use    Vaping status: Never Used   Substance Use Topics    Alcohol use: Not  Currently     Alcohol/week: 0.0 standard drinks of alcohol     Comment: occasional     Drug use: No           5/15/2024   LAST FHS-7 RESULTS   1st degree relative breast or ovarian cancer No   Any relative bilateral breast cancer No   Any male have breast cancer No   Any ONE woman have BOTH breast AND ovarian cancer No   Any woman with breast cancer before 50yrs No   2 or more relatives with breast AND/OR ovarian cancer No   2 or more relatives with breast AND/OR bowel cancer No           Mammogram Screening - Mammogram every 1-2 years updated in Health Maintenance based on mutual decision making        6/24/2024   STI Screening   New sexual partner(s) since last STI/HIV test? No        History of abnormal Pap smear: No - age 30- 64 PAP with HPV every 5 years recommended        Latest Ref Rng & Units 1/29/2021     8:30 AM 1/29/2021     8:08 AM 8/17/2017     7:40 AM   PAP / HPV   PAP (Historical)   NIL     HPV 16 DNA NEG^Negative Negative   Negative    HPV 18 DNA NEG^Negative Negative   Negative    Other HR HPV NEG^Negative Negative   Negative      ASCVD Risk   The 10-year ASCVD risk score (Lula MCGARRY, et al., 2019) is: 6.2%    Values used to calculate the score:      Age: 63 years      Sex: Female      Is Non- : No      Diabetic: Yes      Tobacco smoker: No      Systolic Blood Pressure: 109 mmHg      Is BP treated: Yes      HDL Cholesterol: 55 mg/dL      Total Cholesterol: 148 mg/dL           Reviewed and updated as needed this visit by Provider                    Lab work is in process  Labs reviewed in EPIC  BP Readings from Last 3 Encounters:   06/24/24 109/69   06/11/24 101/66   01/17/24 110/59    Wt Readings from Last 3 Encounters:   06/24/24 58.5 kg (129 lb)   06/11/24 59.6 kg (131 lb 6.4 oz)   05/30/24 59 kg (130 lb)                  Current Outpatient Medications   Medication Sig Dispense Refill    albuterol (PROAIR HFA/PROVENTIL HFA/VENTOLIN HFA) 108 (90 Base) MCG/ACT inhaler  "Inhale 2 puffs into the lungs every 6 hours as needed for shortness of breath or wheezing 18 g 11    ASPIRIN 81 PO Take 81 mg by mouth daily      atorvastatin (LIPITOR) 20 MG tablet Take 1 tablet (20 mg) by mouth daily 90 tablet 3    blood glucose (CONTOUR NEXT TEST) test strip 1 strip by In Vitro route daily 100 strip 3    blood glucose monitoring (NO BRAND SPECIFIED) meter device kit Use to test blood sugar 1 times daily or as directed. Blood Glucose Monitor Brands: per insurance. 1 kit 0    EPINEPHrine (ANY BX GENERIC EQUIV) 0.3 MG/0.3ML injection 2-pack Inject 0.3 mLs (0.3 mg) into the muscle once as needed for anaphylaxis 0.6 mL 0    losartan (COZAAR) 25 MG tablet Take 0.5 tablets (12.5 mg) by mouth daily 45 tablet 3    metFORMIN (GLUCOPHAGE XR) 500 MG 24 hr tablet Take 2 tablets (1,000 mg) by mouth daily (with dinner) 180 tablet 1    Microlet Lancets MISC USE ONCE DAILY 100 each 4    triamcinolone (KENALOG) 0.1 % external cream Apply topically 2 times daily 15 g 1         Review of Systems  Constitutional, HEENT, cardiovascular, pulmonary, gi and gu systems are negative, except as otherwise noted.     Objective    Exam  /69 (BP Location: Left arm, Patient Position: Chair, Cuff Size: Adult Regular)   Pulse 69   Temp 97.7  F (36.5  C)   Resp 18   Ht 1.499 m (4' 11\")   Wt 58.5 kg (129 lb)   LMP  (LMP Unknown)   SpO2 98%   BMI 26.05 kg/m     Estimated body mass index is 26.05 kg/m  as calculated from the following:    Height as of this encounter: 1.499 m (4' 11\").    Weight as of this encounter: 58.5 kg (129 lb).    Physical Exam  GENERAL: alert and no distress  EYES: Eyes grossly normal to inspection, PERRL and conjunctivae and sclerae normal  HENT: ear canals and TM's normal, nose and mouth without ulcers or lesions  NECK: no adenopathy, no asymmetry, masses, or scars  RESP: lungs clear to auscultation - no rales, rhonchi or wheezes  CV: regular rate and rhythm, normal S1 S2, no S3 or S4, no " murmur, click or rub, no peripheral edema  ABDOMEN: soft, nontender, no hepatosplenomegaly, no masses and bowel sounds normal  MS: no gross musculoskeletal defects noted, no edema  SKIN: no suspicious lesions or rashes  NEURO: Normal strength and tone, mentation intact and speech normal  BACK: no CVA tenderness, no paralumbar tenderness  PSYCH: mentation appears normal, affect normal/bright  LYMPH: no cervical, supraclavicular, axillary, or inguinal adenopathy        Signed Electronically by: Reynaldo Gresham MD

## 2024-06-24 NOTE — PATIENT INSTRUCTIONS
In small plastic dish with cover, mix equal parts of triamcinolone and desitin, ( zinc oxide 40 percent) .

## 2024-07-30 ENCOUNTER — MYC MEDICAL ADVICE (OUTPATIENT)
Dept: FAMILY MEDICINE | Facility: CLINIC | Age: 64
End: 2024-07-30
Payer: COMMERCIAL

## 2024-07-30 NOTE — TELEPHONE ENCOUNTER
Patient Quality Outreach    Patient is due for the following:   Diabetes -  Diabetic Follow-Up Visit and Foot Exam  Asthma  -  ACT needed and Asthma follow-up visit  Colon Cancer Screening    Next Steps:   Schedule a office visit for Diabetes and Asthma follow up    Type of outreach:    Sent [employer] message.      Questions for provider review:    None           Marika Newell CMA  Chart routed to Care Team.

## 2024-09-14 ENCOUNTER — HEALTH MAINTENANCE LETTER (OUTPATIENT)
Age: 64
End: 2024-09-14

## 2024-10-07 ENCOUNTER — OFFICE VISIT (OUTPATIENT)
Dept: OPHTHALMOLOGY | Facility: CLINIC | Age: 64
End: 2024-10-07
Payer: COMMERCIAL

## 2024-10-07 DIAGNOSIS — Z01.01 ENCOUNTER FOR EXAMINATION OF EYES AND VISION WITH ABNORMAL FINDINGS: ICD-10-CM

## 2024-10-07 DIAGNOSIS — H35.373 EPIRETINAL MEMBRANE (ERM) OF BOTH EYES: ICD-10-CM

## 2024-10-07 DIAGNOSIS — E11.9 TYPE 2 DIABETES MELLITUS WITHOUT COMPLICATION, WITHOUT LONG-TERM CURRENT USE OF INSULIN (H): Primary | Chronic | ICD-10-CM

## 2024-10-07 DIAGNOSIS — H52.4 PRESBYOPIA: ICD-10-CM

## 2024-10-07 DIAGNOSIS — H25.813 COMBINED FORMS OF AGE-RELATED CATARACT OF BOTH EYES: ICD-10-CM

## 2024-10-07 PROCEDURE — 92015 DETERMINE REFRACTIVE STATE: CPT | Performed by: OPHTHALMOLOGY

## 2024-10-07 PROCEDURE — 92014 COMPRE OPH EXAM EST PT 1/>: CPT | Performed by: OPHTHALMOLOGY

## 2024-10-07 ASSESSMENT — VISUAL ACUITY
OS_SC+: -2
OD_SC: 20/30
OS_SC: J7
OD_SC+: -2
METHOD: SNELLEN - LINEAR
OD_SC: J5
OS_SC: 20/30

## 2024-10-07 ASSESSMENT — SLIT LAMP EXAM - LIDS
COMMENTS: 2+ DERMATOCHALASIS
COMMENTS: 2+ DERMATOCHALASIS

## 2024-10-07 ASSESSMENT — CONF VISUAL FIELD
OS_SUPERIOR_NASAL_RESTRICTION: 0
OS_NORMAL: 1
OD_SUPERIOR_NASAL_RESTRICTION: 0
METHOD: COUNTING FINGERS
OD_SUPERIOR_TEMPORAL_RESTRICTION: 0
OD_INFERIOR_NASAL_RESTRICTION: 0
OS_INFERIOR_NASAL_RESTRICTION: 0
OD_NORMAL: 1
OD_INFERIOR_TEMPORAL_RESTRICTION: 0
OS_INFERIOR_TEMPORAL_RESTRICTION: 0
OS_SUPERIOR_TEMPORAL_RESTRICTION: 0

## 2024-10-07 ASSESSMENT — REFRACTION_MANIFEST
OS_SPHERE: -0.50
OD_SPHERE: -0.25
OD_AXIS: 025
OD_ADD: +2.50
OD_CYLINDER: +0.25
OS_CYLINDER: +1.25
OS_AXIS: 010
OS_ADD: +2.50

## 2024-10-07 ASSESSMENT — TONOMETRY
OD_IOP_MMHG: 16
IOP_METHOD: APPLANATION
OS_IOP_MMHG: 14

## 2024-10-07 ASSESSMENT — CUP TO DISC RATIO
OS_RATIO: 0.4
OD_RATIO: 0.3

## 2024-10-07 ASSESSMENT — EXTERNAL EXAM - RIGHT EYE: OD_EXAM: NORMAL

## 2024-10-07 ASSESSMENT — EXTERNAL EXAM - LEFT EYE: OS_EXAM: NORMAL

## 2024-10-07 NOTE — PROGRESS NOTES
"Current Eye Medications:  Refresh both eyes daily     Subjective:  Patient here today for diabetic eye exam. Vision seems stable, she continues to use otc readers as needed. She does notice she has to tilt her head when glasses slip down on face. A1C & blood sugars are well controlled. No eye pain/discomfort today.     Lab Results   Component Value Date    A1C 6.2 05/15/2024    A1C 6.1 01/17/2024    A1C 6.9 07/25/2023    A1C 7.3 04/12/2023    A1C 7.2 07/08/2022    A1C 7.3 01/08/2021    A1C 6.8 06/05/2020    A1C 6.8 06/03/2019    A1C 6.6 12/06/2018    A1C 6.6 08/20/2018        Objective:  See Ophthalmology Exam.       Assessment:  Stable eye exam in patient with diabetes.  No diabetic retinopathy.  Mild worsening of cataracts.      ICD-10-CM    1. Type 2 diabetes mellitus without complication, without long-term current use of insulin (H)  E11.9 Adult Eye  Referral      2. Combined forms of age-related cataract, mild-mod, of both eyes  H25.813       3. Epiretinal membrane, mild, od > os  H35.373       4. Encounter for examination of eyes and vision with abnormal findings  Z01.01       5. Presbyopia  H52.4            Plan:  Glasses prescription given - optional    May use artificial tears up to four times a day (like Refresh Optive, Systane Balance, or TheraTears. Avoid \"get the red out\" drops and generic artifical tears).     Possible posterior vitreous detachment (sudden onset large floater and/or flashing lights) both eyes discussed.     Call in June 2025 for an appointment in October 2025 for Complete Exam    Dr. Lou [phone]         "

## 2024-10-07 NOTE — PATIENT INSTRUCTIONS
"Glasses prescription given - optional    May use artificial tears up to four times a day (like Refresh Optive, Systane Balance, or TheraTears. Avoid \"get the red out\" drops and generic artifical tears).     Possible posterior vitreous detachment (sudden onset large floater and/or flashing lights) both eyes discussed.     Call in June 2025 for an appointment in October 2025 for Complete Exam    Dr. Lou [phone]      Patient Education   Diabetes weakens the blood vessels all over the body, including the eyes. Damage to the blood vessels in the eyes can cause swelling or bleeding into part of the eye (called the retina). This is called diabetic retinopathy (MONIQUE-tin-AH-puh-thee). If not treated, this disease can cause vision loss or blindness.   Symptoms may include blurred or distorted vision, but many people have no symptoms. It's important to see your eye doctor regularly to check for problems.   Early treatment and good control can help protect your vision. Here are the things you can do to help prevent vision loss:      1. Keep your blood sugar levels under tight control.      2. Bring high blood pressure under control.      3. No smoking.      4. Have yearly dilated eye exams.     "

## 2024-10-07 NOTE — Clinical Note
10/7/2024      Karyn Ly  [address] [id number]      Dear Colleague,    Thank you for referring your patient, Karyn Ly, to the [facility]. Please see a copy of my visit note below.    No notes on file    Again, thank you for allowing me to participate in the care of your patient.        Sincerely,        [place] Lou MD

## 2024-11-13 DIAGNOSIS — E11.9 TYPE 2 DIABETES MELLITUS WITHOUT COMPLICATION, WITHOUT LONG-TERM CURRENT USE OF INSULIN (H): ICD-10-CM

## 2024-11-13 RX ORDER — METFORMIN HYDROCHLORIDE 500 MG/1
1000 TABLET, EXTENDED RELEASE ORAL
Qty: 180 TABLET | Refills: 0 | Status: SHIPPED | OUTPATIENT
Start: 2024-11-13

## 2024-11-24 ASSESSMENT — ASTHMA QUESTIONNAIRES
QUESTION_5 LAST FOUR WEEKS HOW WOULD YOU RATE YOUR ASTHMA CONTROL: COMPLETELY CONTROLLED
ACT_TOTALSCORE: 25
QUESTION_2 LAST FOUR WEEKS HOW OFTEN HAVE YOU HAD SHORTNESS OF BREATH: NOT AT ALL
QUESTION_3 LAST FOUR WEEKS HOW OFTEN DID YOUR ASTHMA SYMPTOMS (WHEEZING, COUGHING, SHORTNESS OF BREATH, CHEST TIGHTNESS OR PAIN) WAKE YOU UP AT NIGHT OR EARLIER THAN USUAL IN THE MORNING: NOT AT ALL
QUESTION_1 LAST FOUR WEEKS HOW MUCH OF THE TIME DID YOUR ASTHMA KEEP YOU FROM GETTING AS MUCH DONE AT WORK, SCHOOL OR AT HOME: NONE OF THE TIME
QUESTION_4 LAST FOUR WEEKS HOW OFTEN HAVE YOU USED YOUR RESCUE INHALER OR NEBULIZER MEDICATION (SUCH AS ALBUTEROL): NOT AT ALL
ACT_TOTALSCORE: 25

## 2024-11-25 ENCOUNTER — LAB (OUTPATIENT)
Dept: LAB | Facility: CLINIC | Age: 64
End: 2024-11-25
Payer: COMMERCIAL

## 2024-11-25 DIAGNOSIS — E11.9 TYPE 2 DIABETES MELLITUS WITHOUT COMPLICATION, WITHOUT LONG-TERM CURRENT USE OF INSULIN (H): Primary | ICD-10-CM

## 2024-11-25 LAB
EST. AVERAGE GLUCOSE BLD GHB EST-MCNC: 146 MG/DL
HBA1C MFR BLD: 6.7 % (ref 0–5.6)

## 2024-11-25 PROCEDURE — 83036 HEMOGLOBIN GLYCOSYLATED A1C: CPT

## 2024-11-25 PROCEDURE — 36415 COLL VENOUS BLD VENIPUNCTURE: CPT

## 2024-11-26 ENCOUNTER — ORDERS ONLY (AUTO-RELEASED) (OUTPATIENT)
Dept: FAMILY MEDICINE | Facility: CLINIC | Age: 64
End: 2024-11-26

## 2024-11-26 ENCOUNTER — OFFICE VISIT (OUTPATIENT)
Dept: FAMILY MEDICINE | Facility: CLINIC | Age: 64
End: 2024-11-26
Payer: COMMERCIAL

## 2024-11-26 VITALS
BODY MASS INDEX: 27.21 KG/M2 | TEMPERATURE: 97.5 F | HEART RATE: 61 BPM | RESPIRATION RATE: 18 BRPM | HEIGHT: 59 IN | WEIGHT: 135 LBS | OXYGEN SATURATION: 100 % | DIASTOLIC BLOOD PRESSURE: 62 MMHG | SYSTOLIC BLOOD PRESSURE: 105 MMHG

## 2024-11-26 DIAGNOSIS — Z12.11 COLON CANCER SCREENING: Primary | ICD-10-CM

## 2024-11-26 DIAGNOSIS — Z12.11 COLON CANCER SCREENING: ICD-10-CM

## 2024-11-26 DIAGNOSIS — E78.5 HYPERLIPIDEMIA LDL GOAL <100: Chronic | ICD-10-CM

## 2024-11-26 DIAGNOSIS — E11.9 TYPE 2 DIABETES MELLITUS WITHOUT COMPLICATION, WITHOUT LONG-TERM CURRENT USE OF INSULIN (H): Chronic | ICD-10-CM

## 2024-11-26 DIAGNOSIS — G47.33 OSA (OBSTRUCTIVE SLEEP APNEA): Chronic | ICD-10-CM

## 2024-11-26 PROCEDURE — 99214 OFFICE O/P EST MOD 30 MIN: CPT | Performed by: INTERNAL MEDICINE

## 2024-11-26 ASSESSMENT — PAIN SCALES - GENERAL: PAINLEVEL_OUTOF10: NO PAIN (0)

## 2024-11-26 NOTE — PROGRESS NOTES
"  Assessment & Plan   Problem List Items Addressed This Visit       Hyperlipidemia LDL goal <100 (Chronic)    KASSIDY (obstructive sleep apnea) - mild (AHI 12) (Chronic)    Type 2 diabetes mellitus without complication, without long-term current use of insulin (H) (Chronic)     Other Visit Diagnoses       Colon cancer screening    -  Primary    Relevant Orders    [employer])             BP     105/62  11/27/2024    Lab Results   Component Value Date     05/15/2024     01/21/2022     01/08/2021     Lab Results   Component Value Date    A1C 6.7 11/25/2024    A1C 7.3 01/08/2021     Lab Results   Component Value Date    LDL 75 05/15/2024    LDL 95 01/08/2021     Lab Results   Component Value Date    MICROL 14.2 05/15/2024    MICROL 14 01/21/2022    MICROL 11 01/08/2021     No results found for: \"MICROALBUMIN\"       Work on weight loss  Regular exercise      Subjective   Karyn Hairston is a 64 year old, presenting for the following health issues:  Diabetes        11/26/2024     8:19 AM   Additional Questions   Roomed by Dee     History of Present Illness       Diabetes:   She presents for follow up of diabetes.  She is checking home blood glucose one time daily.   She checks blood glucose before meals.  Blood glucose is never over 200 and never under 70. She is aware of hypoglycemia symptoms including shakiness.   She is concerned about other.    She is not experiencing numbness or burning in feet, excessive thirst, blurry vision, weight changes or redness, sores or blisters on feet.           She eats 2-3 servings of fruits and vegetables daily.She consumes 0 sweetened beverage(s) daily.She exercises with enough effort to increase her heart rate 30 to 60 minutes per day.  She exercises with enough effort to increase her heart rate 5 days per week.   She is taking medications regularly.     Brother driving             Review of Systems  Constitutional, HEENT, cardiovascular, pulmonary, gi " "and gu systems are negative, except as otherwise noted.      Objective    /62 (BP Location: Left arm, Patient Position: Sitting, Cuff Size: Adult Regular)   Pulse 61   Temp 97.5  F (36.4  C) (Oral)   Resp 18   Ht 1.499 m (4' 11\")   Wt 61.2 kg (135 lb)   LMP  (LMP Unknown)   SpO2 100%   BMI 27.27 kg/m    Body mass index is 27.27 kg/m .  Physical Exam   GENERAL: alert and no distress  EYES: Eyes grossly normal to inspection, PERRL and conjunctivae and sclerae normal  HENT: ear canals and TM's normal, nose and mouth without ulcers or lesions  NECK: no adenopathy, no asymmetry, masses, or scars  RESP: lungs clear to auscultation - no rales, rhonchi or wheezes  CV: regular rate and rhythm, normal S1 S2, no S3 or S4, no murmur, click or rub, no peripheral edema  ABDOMEN: soft, nontender, no hepatosplenomegaly, no masses and bowel sounds normal  MS: no gross musculoskeletal defects noted, no edema  SKIN: no suspicious lesions or rashes  NEURO: Normal strength and tone, mentation intact and speech normal  BACK: no CVA tenderness, no paralumbar tenderness  PSYCH: mentation appears normal, affect normal/bright  LYMPH: no cervical, supraclavicular, axillary, or inguinal adenopathy    No results found for any visits on 11/26/24.        Signed Electronically by: Reynaldo Gresham MD    "

## 2024-12-12 LAB — NONINV COLON CA DNA+OCC BLD SCRN STL QL: NEGATIVE

## 2024-12-16 DIAGNOSIS — E11.9 TYPE 2 DIABETES MELLITUS WITHOUT COMPLICATION, WITHOUT LONG-TERM CURRENT USE OF INSULIN (H): ICD-10-CM

## 2024-12-16 RX ORDER — ASPIRIN 81 MG/1
81 TABLET, COATED ORAL DAILY
Qty: 90 TABLET | Refills: 3 | Status: SHIPPED | OUTPATIENT
Start: 2024-12-16

## 2025-01-14 DIAGNOSIS — E11.9 TYPE 2 DIABETES MELLITUS WITHOUT COMPLICATION, WITHOUT LONG-TERM CURRENT USE OF INSULIN (H): ICD-10-CM

## 2025-01-14 RX ORDER — LOSARTAN POTASSIUM 25 MG/1
12.5 TABLET ORAL DAILY
Qty: 45 TABLET | Refills: 0 | Status: SHIPPED | OUTPATIENT
Start: 2025-01-14

## 2025-02-08 DIAGNOSIS — E11.9 TYPE 2 DIABETES MELLITUS WITHOUT COMPLICATION, WITHOUT LONG-TERM CURRENT USE OF INSULIN (H): ICD-10-CM

## 2025-02-10 RX ORDER — METFORMIN HYDROCHLORIDE 500 MG/1
1000 TABLET, EXTENDED RELEASE ORAL
Qty: 180 TABLET | Refills: 0 | Status: SHIPPED | OUTPATIENT
Start: 2025-02-10

## 2025-03-08 ENCOUNTER — HEALTH MAINTENANCE LETTER (OUTPATIENT)
Age: 65
End: 2025-03-08

## 2025-05-02 ENCOUNTER — LAB (OUTPATIENT)
Dept: LAB | Facility: CLINIC | Age: 65
End: 2025-05-02
Payer: COMMERCIAL

## 2025-05-02 ENCOUNTER — MYC MEDICAL ADVICE (OUTPATIENT)
Dept: SLEEP MEDICINE | Facility: CLINIC | Age: 65
End: 2025-05-02

## 2025-05-02 DIAGNOSIS — E11.9 TYPE 2 DIABETES MELLITUS WITHOUT COMPLICATION, WITHOUT LONG-TERM CURRENT USE OF INSULIN (H): Primary | ICD-10-CM

## 2025-05-02 DIAGNOSIS — G47.33 OSA (OBSTRUCTIVE SLEEP APNEA): Primary | ICD-10-CM

## 2025-05-07 ENCOUNTER — RESULTS FOLLOW-UP (OUTPATIENT)
Dept: FAMILY MEDICINE | Facility: CLINIC | Age: 65
End: 2025-05-07

## 2025-05-12 ENCOUNTER — ANCILLARY PROCEDURE (OUTPATIENT)
Dept: GENERAL RADIOLOGY | Facility: CLINIC | Age: 65
End: 2025-05-12
Attending: INTERNAL MEDICINE
Payer: COMMERCIAL

## 2025-05-12 ENCOUNTER — OFFICE VISIT (OUTPATIENT)
Dept: FAMILY MEDICINE | Facility: CLINIC | Age: 65
End: 2025-05-12
Payer: COMMERCIAL

## 2025-05-12 VITALS
RESPIRATION RATE: 18 BRPM | DIASTOLIC BLOOD PRESSURE: 67 MMHG | SYSTOLIC BLOOD PRESSURE: 107 MMHG | TEMPERATURE: 98.3 F | OXYGEN SATURATION: 97 % | WEIGHT: 131 LBS | HEART RATE: 65 BPM | BODY MASS INDEX: 26.41 KG/M2 | HEIGHT: 59 IN

## 2025-05-12 DIAGNOSIS — S69.91XA HAND INJURY, RIGHT, INITIAL ENCOUNTER: ICD-10-CM

## 2025-05-12 DIAGNOSIS — E11.9 TYPE 2 DIABETES MELLITUS WITHOUT COMPLICATION, WITHOUT LONG-TERM CURRENT USE OF INSULIN (H): ICD-10-CM

## 2025-05-12 DIAGNOSIS — Z91.013 SHELLFISH ALLERGY: ICD-10-CM

## 2025-05-12 DIAGNOSIS — Z00.00 ROUTINE GENERAL MEDICAL EXAMINATION AT A HEALTH CARE FACILITY: Primary | ICD-10-CM

## 2025-05-12 PROCEDURE — 99396 PREV VISIT EST AGE 40-64: CPT | Performed by: INTERNAL MEDICINE

## 2025-05-12 PROCEDURE — 73130 X-RAY EXAM OF HAND: CPT | Mod: TC | Performed by: RADIOLOGY

## 2025-05-12 RX ORDER — EPINEPHRINE 0.3 MG/.3ML
0.3 INJECTION SUBCUTANEOUS
Qty: 0.6 ML | Refills: 0 | Status: SHIPPED | OUTPATIENT
Start: 2025-05-12

## 2025-05-12 SDOH — HEALTH STABILITY: PHYSICAL HEALTH: ON AVERAGE, HOW MANY DAYS PER WEEK DO YOU ENGAGE IN MODERATE TO STRENUOUS EXERCISE (LIKE A BRISK WALK)?: 5 DAYS

## 2025-05-12 ASSESSMENT — PAIN SCALES - GENERAL: PAINLEVEL_OUTOF10: NO PAIN (0)

## 2025-05-12 ASSESSMENT — SOCIAL DETERMINANTS OF HEALTH (SDOH): HOW OFTEN DO YOU GET TOGETHER WITH FRIENDS OR RELATIVES?: MORE THAN THREE TIMES A WEEK

## 2025-05-12 NOTE — PATIENT INSTRUCTIONS
Patient Education   Preventive Care Advice   This is general advice given by our system to help you stay healthy. However, your care team may have specific advice just for you. Please talk to your care team about your preventive care needs.  Nutrition  Eat 5 or more servings of fruits and vegetables each day.  Try wheat bread, brown rice and whole grain pasta (instead of white bread, rice, and pasta).  Get enough calcium and vitamin D. Check the label on foods and aim for 100% of the RDA (recommended daily allowance).  Lifestyle  Exercise at least 150 minutes each week  (30 minutes a day, 5 days a week).  Do muscle strengthening activities 2 days a week. These help control your weight and prevent disease.  No smoking.  Wear sunscreen to prevent skin cancer.  Have a dental exam and cleaning every 6 months.  Yearly exams  See your health care team every year to talk about:  Any changes in your health.  Any medicines your care team has prescribed.  Preventive care, family planning, and ways to prevent chronic diseases.  Shots (vaccines)   HPV shots (up to age 26), if you've never had them before.  Hepatitis B shots (up to age 59), if you've never had them before.  COVID-19 shot: Get this shot when it's due.  Flu shot: Get a flu shot every year.  Tetanus shot: Get a tetanus shot every 10 years.  Pneumococcal, hepatitis A, and RSV shots: Ask your care team if you need these based on your risk.  Shingles shot (for age 50 and up)  General health tests  Diabetes screening:  Starting at age 35, Get screened for diabetes at least every 3 years.  If you are younger than age 35, ask your care team if you should be screened for diabetes.  Cholesterol test: At age 39, start having a cholesterol test every 5 years, or more often if advised.  Bone density scan (DEXA): At age 50, ask your care team if you should have this scan for osteoporosis (brittle bones).  Hepatitis C: Get tested at least once in your life.  STIs (sexually  transmitted infections)  Before age 24: Ask your care team if you should be screened for STIs.  After age 24: Get screened for STIs if you're at risk. You are at risk for STIs (including HIV) if:  You are sexually active with more than one person.  You don't use condoms every time.  You or a partner was diagnosed with a sexually transmitted infection.  If you are at risk for HIV, ask about PrEP medicine to prevent HIV.  Get tested for HIV at least once in your life, whether you are at risk for HIV or not.  Cancer screening tests  Cervical cancer screening: If you have a cervix, begin getting regular cervical cancer screening tests starting at age 21.  Breast cancer scan (mammogram): If you've ever had breasts, begin having regular mammograms starting at age 40. This is a scan to check for breast cancer.  Colon cancer screening: It is important to start screening for colon cancer at age 45.  Have a colonoscopy test every 10 years (or more often if you're at risk) Or, ask your provider about stool tests like a FIT test every year or Cologuard test every 3 years.  To learn more about your testing options, visit:   .  For help making a decision, visit:   https://bit.ly/ty62919.  Prostate cancer screening test: If you have a prostate, ask your care team if a prostate cancer screening test (PSA) at age 55 is right for you.  Lung cancer screening: If you are a current or former smoker ages 50 to 80, ask your care team if ongoing lung cancer screenings are right for you.  For informational purposes only. Not to replace the advice of your health care provider. Copyright   2023 [employer]. All rights reserved. Clinically reviewed by the [facility] Transitions Program. [employer] 117201 - REV 01/24.

## 2025-05-12 NOTE — PROGRESS NOTES
"Preventive Care Visit  [facility] DONTRELL Gresham MD, Internal Medicine - Pediatrics  May 12, 2025      Assessment & Plan   Problem List Items Addressed This Visit       Shellfish allergy    Relevant Medications    EPINEPHrine (ANY BX GENERIC EQUIV) 0.3 MG/0.3ML injection 2-pack     Other Visit Diagnoses         Routine general medical examination at a health care facility    -  Primary      Hand injury, right, initial encounter        Relevant Orders    XR Hand Right G/E 3 Views (Completed)                    BMI  Estimated body mass index is 26.06 kg/m  as calculated from the following:    Height as of this encounter: 1.51 m (4' 11.45\").    Weight as of this encounter: 59.4 kg (131 lb).   Weight management plan: Discussed healthy diet and exercise guidelines    Counseling  Appropriate preventive services were addressed with this patient via screening, questionnaire, or discussion as appropriate for fall prevention, nutrition, physical activity, Tobacco-use cessation, social engagement, weight loss and cognition.  Checklist reviewing preventive services available has been given to the patient.  Reviewed patient's diet, addressing concerns and/or questions.         Subjective   Krayn Hairston is a 64 year old, presenting for the following:  Physical        5/12/2025     8:23 AM   Additional Questions   Roomed by Rosi        The 10-year ASCVD risk score (Lula DK, et al., 2019) is: 7.5%    Values used to calculate the score:      Age: 64 years      Sex: Female      Is Non- : No      Diabetic: Yes      Tobacco smoker: No      Systolic Blood Pressure: 107 mmHg      Is BP treated: Yes      HDL Cholesterol: 52 mg/dL      Total Cholesterol: 169 mg/dL    HPI           Advance Care Planning    Discussed advance care planning with patient; informed AVS has link to [facility].        5/12/2025   General Health   How would you rate your overall physical health? Good "   Feel stress (tense, anxious, or unable to sleep) Not at all         5/12/2025   Nutrition   Three or more servings of calcium each day? Yes   Diet: Regular (no restrictions)   How many servings of fruit and vegetables per day? (!) 2-3   How many sweetened beverages each day? 0-1         5/12/2025   Exercise   Days per week of moderate/strenous exercise 5 days         5/12/2025   Social Factors   Frequency of gathering with friends or relatives More than three times a week   Worry food won't last until get money to buy more No   Food not last or not have enough money for food? No   Do you have housing? (Housing is defined as stable permanent housing and does not include staying outside in a car, in a tent, in an abandoned building, in an overnight shelter, or couch-surfing.) Yes   Are you worried about losing your housing? No   Lack of transportation? No   Unable to get utilities (heat,electricity)? No         5/12/2025   Fall Risk   Fallen 2 or more times in the past year? No   Trouble with walking or balance? No          5/12/2025   Dental   Dentist two times every year? Yes         Today's PHQ-2 Score:       5/12/2025     8:16 AM   PHQ-2 ( 1999 [employer])   Q1: Little interest or pleasure in doing things 0   Q2: Feeling down, depressed or hopeless 0   PHQ-2 Score 0    Q1: Little interest or pleasure in doing things Not at all   Q2: Feeling down, depressed or hopeless Not at all   PHQ-2 Score 0       Patient-reported           5/12/2025   Substance Use   Alcohol more than 3/day or more than 7/wk No   Do you use any other substances recreationally? No     Social History     Tobacco Use    Smoking status: Never     Passive exposure: Never    Smokeless tobacco: Never   Vaping Use    Vaping status: Never Used   Substance Use Topics    Alcohol use: Not Currently     Alcohol/week: 0.0 standard drinks of alcohol     Comment: occasional     Drug use: No           5/15/2024   LAST FHS-7 RESULTS   1st degree relative breast  or ovarian cancer No   Any relative bilateral breast cancer No   Any male have breast cancer No   Any ONE woman have BOTH breast AND ovarian cancer No   Any woman with breast cancer before 50yrs No   2 or more relatives with breast AND/OR ovarian cancer No   2 or more relatives with breast AND/OR bowel cancer No        Mammogram Screening - Mammogram every 1-2 years updated in Health Maintenance based on mutual decision making        5/12/2025   STI Screening   New sexual partner(s) since last STI/HIV test? No     History of abnormal Pap smear: No - age 30-64 HPV with reflex Pap every 5 years recommended        Latest Ref Rng & Units 1/29/2021     8:30 AM 1/29/2021     8:08 AM 8/17/2017     7:40 AM   PAP / HPV   PAP (Historical)   NIL     HPV 16 DNA NEG^Negative Negative   Negative    HPV 18 DNA NEG^Negative Negative   Negative    Other HR HPV NEG^Negative Negative   Negative      ASCVD Risk   The 10-year ASCVD risk score (Lula MCGARRY, et al., 2019) is: 7.5%    Values used to calculate the score:      Age: 64 years      Sex: Female      Is Non- : No      Diabetic: Yes      Tobacco smoker: No      Systolic Blood Pressure: 107 mmHg      Is BP treated: Yes      HDL Cholesterol: 52 mg/dL      Total Cholesterol: 169 mg/dL           Reviewed and updated as needed this visit by Provider                    Lab work is in process  Labs reviewed in EPIC  BP Readings from Last 3 Encounters:   05/12/25 107/67   11/26/24 105/62   06/24/24 109/69    Wt Readings from Last 3 Encounters:   05/12/25 59.4 kg (131 lb)   11/26/24 61.2 kg (135 lb)   06/24/24 58.5 kg (129 lb)                  Patient Active Problem List   Diagnosis    Hyperlipidemia LDL goal <100    Mild intermittent asthma    Type 2 diabetes mellitus without complication, without long-term current use of insulin (H)    Epiretinal membrane, mild, od > os    Shellfish allergy    Combined forms of age-related cataract, mild, of both eyes     KASSIDY (obstructive sleep apnea) - mild (AHI 12)    Primary hypertension     Past Surgical History:   Procedure Laterality Date    COLONOSCOPY  2/17/2014    Procedure: COLONOSCOPY;  colon-screening / vandana;  Surgeon: Donovan Dinero MD;  Location:  OR       Social History     Tobacco Use    Smoking status: Never     Passive exposure: Never    Smokeless tobacco: Never   Substance Use Topics    Alcohol use: Not Currently     Alcohol/week: 0.0 standard drinks of alcohol     Comment: occasional      Family History   Problem Relation Age of Onset    Hypertension Mother     Diabetes Father     Glaucoma Father     Snoring Father     Lipids Brother     Macular Degeneration No family hx of          Current Outpatient Medications   Medication Sig Dispense Refill    albuterol (PROAIR HFA/PROVENTIL HFA/VENTOLIN HFA) 108 (90 Base) MCG/ACT inhaler Inhale 2 puffs into the lungs every 6 hours as needed for shortness of breath or wheezing 18 g 11    aspirin (ASPIRIN LOW DOSE) 81 MG EC tablet TAKE 1 TABLET (81 MG) BY MOUTH DAILY 90 tablet 3    ASPIRIN 81 PO Take 81 mg by mouth daily      atorvastatin (LIPITOR) 20 MG tablet Take 1 tablet (20 mg) by mouth daily. 90 tablet 2    blood glucose (CONTOUR NEXT TEST) test strip USE ONE TO TEST ONCE DAILY 100 strip 1    blood glucose monitoring (NO BRAND SPECIFIED) meter device kit Use to test blood sugar 1 times daily or as directed. Blood Glucose Monitor Brands: per insurance. 1 kit 0    EPINEPHrine (ANY BX GENERIC EQUIV) 0.3 MG/0.3ML injection 2-pack Inject 0.3 mLs (0.3 mg) into the muscle once as needed for anaphylaxis. 0.6 mL 0    losartan (COZAAR) 25 MG tablet Take 0.5 tablets (12.5 mg) by mouth daily 45 tablet 0    metFORMIN (GLUCOPHAGE XR) 500 MG 24 hr tablet Take 2 tablets (1,000 mg) by mouth daily (with dinner) 180 tablet 0    Microlet Lancets MISC USE ONCE DAILY 100 each 4    triamcinolone (KENALOG) 0.1 % external cream Apply topically 2 times daily 15 g 1     Allergies  "  Allergen Reactions    Shellfish-Derived Products Anaphylaxis    Ace Inhibitors Cough     Recent Labs   Lab Test 05/02/25  0742 11/25/24  0755 05/15/24  0849 07/25/23  0724 04/12/23  0732 09/07/21  0724 01/08/21  0905 06/05/20  1110 05/22/18  0658 11/10/17  0729   A1C 6.7* 6.7* 6.2*   < > 7.3*   < > 7.3* 6.8*   < > 6.4*   LDL 96  --  75  --  81   < > 95 77   < > 81   HDL 52  --  55  --  42*   < > 47* 46*   < > 44*   TRIG 104  --  92  --  121   < > 103 180*   < > 128   ALT 18  --  20  --   --   --   --   --   --  27   CR 0.71  --  0.68  --  0.78   < > 0.72 0.57   < >  --    GFRESTIMATED >90  --  >90  --  86   < > >90 >90   < >  --    GFRESTBLACK  --   --   --   --   --   --  >90 >90   < >  --    POTASSIUM 4.4  --  4.4  --  5.0   < > 4.2 4.2   < >  --    TSH 1.03  --   --   --   --   --  0.74  --   --   --     < > = values in this interval not displayed.          Review of Systems  Constitutional, HEENT, cardiovascular, pulmonary, gi and gu systems are negative, except as otherwise noted.     Objective    Exam  /67   Pulse 65   Temp 98.3  F (36.8  C) (Temporal)   Resp 18   Ht 1.51 m (4' 11.45\")   Wt 59.4 kg (131 lb)   LMP  (LMP Unknown)   SpO2 97%   BMI 26.06 kg/m     Estimated body mass index is 26.06 kg/m  as calculated from the following:    Height as of this encounter: 1.51 m (4' 11.45\").    Weight as of this encounter: 59.4 kg (131 lb).    Physical Exam  GENERAL: alert and no distress  EYES: Eyes grossly normal to inspection, PERRL and conjunctivae and sclerae normal  HENT: ear canals and TM's normal, nose and mouth without ulcers or lesions  NECK: no adenopathy, no asymmetry, masses, or scars  RESP: lungs clear to auscultation - no rales, rhonchi or wheezes  CV: regular rate and rhythm, normal S1 S2, no S3 or S4, no murmur, click or rub, no peripheral edema  ABDOMEN: soft, nontender, no hepatosplenomegaly, no masses and bowel sounds normal  MS: no gross musculoskeletal defects noted, no " edema  SKIN: no suspicious lesions or rashes  NEURO: Normal strength and tone, mentation intact and speech normal  BACK: no CVA tenderness, no paralumbar tenderness  PSYCH: mentation appears normal, affect normal/bright  LYMPH: no cervical, supraclavicular, axillary, or inguinal adenopathy        Signed Electronically by: Reynaldo Gresham MD

## 2025-05-13 ENCOUNTER — RESULTS FOLLOW-UP (OUTPATIENT)
Dept: FAMILY MEDICINE | Facility: CLINIC | Age: 65
End: 2025-05-13
Payer: COMMERCIAL

## 2025-05-13 DIAGNOSIS — S63.259D DISLOCATION OF FINGER, SUBSEQUENT ENCOUNTER: ICD-10-CM

## 2025-05-13 DIAGNOSIS — S69.91XA HAND INJURY, RIGHT, INITIAL ENCOUNTER: Primary | ICD-10-CM

## 2025-05-17 DIAGNOSIS — E11.9 TYPE 2 DIABETES MELLITUS WITHOUT COMPLICATION, WITHOUT LONG-TERM CURRENT USE OF INSULIN (H): ICD-10-CM

## 2025-05-19 RX ORDER — LANCETS
EACH MISCELLANEOUS DAILY
Qty: 100 EACH | Refills: 0 | Status: SHIPPED | OUTPATIENT
Start: 2025-05-19

## 2025-05-19 RX ORDER — METFORMIN HYDROCHLORIDE 500 MG/1
1000 TABLET, EXTENDED RELEASE ORAL
Qty: 180 TABLET | Refills: 0 | Status: SHIPPED | OUTPATIENT
Start: 2025-05-19

## 2025-05-19 RX ORDER — LOSARTAN POTASSIUM 25 MG/1
12.5 TABLET ORAL DAILY
Qty: 45 TABLET | Refills: 2 | Status: SHIPPED | OUTPATIENT
Start: 2025-05-19

## 2025-05-30 ENCOUNTER — ANCILLARY PROCEDURE (OUTPATIENT)
Dept: MAMMOGRAPHY | Facility: CLINIC | Age: 65
End: 2025-05-30
Attending: INTERNAL MEDICINE
Payer: COMMERCIAL

## 2025-05-30 DIAGNOSIS — Z12.31 VISIT FOR SCREENING MAMMOGRAM: ICD-10-CM

## 2025-05-30 PROCEDURE — 77063 BREAST TOMOSYNTHESIS BI: CPT | Mod: TC | Performed by: RADIOLOGY

## 2025-05-30 PROCEDURE — 77067 SCR MAMMO BI INCL CAD: CPT | Mod: TC | Performed by: RADIOLOGY

## 2025-06-11 ENCOUNTER — THERAPY VISIT (OUTPATIENT)
Dept: OCCUPATIONAL THERAPY | Facility: CLINIC | Age: 65
End: 2025-06-11
Attending: INTERNAL MEDICINE
Payer: COMMERCIAL

## 2025-06-11 DIAGNOSIS — S69.91XA HAND INJURY, RIGHT, INITIAL ENCOUNTER: ICD-10-CM

## 2025-06-11 DIAGNOSIS — M79.641 PAIN OF RIGHT HAND: Primary | ICD-10-CM

## 2025-06-11 DIAGNOSIS — S63.259D DISLOCATION OF FINGER, SUBSEQUENT ENCOUNTER: ICD-10-CM

## 2025-06-11 PROCEDURE — 97165 OT EVAL LOW COMPLEX 30 MIN: CPT | Mod: GO

## 2025-06-11 PROCEDURE — 97110 THERAPEUTIC EXERCISES: CPT | Mod: GO

## 2025-06-11 PROCEDURE — 97530 THERAPEUTIC ACTIVITIES: CPT | Mod: GO

## 2025-06-11 NOTE — PROGRESS NOTES
OCCUPATIONAL THERAPY EVALUATION  Type of Visit: Evaluation    Fall Risk Screen:  Have you fallen 2 or more times in the past year?: No  Have you fallen and had an injury in the past year?: No    Subjective        Presenting condition or subjective complaint: right hand-middle finger and wrist  Date of onset: 03/01/25    Relevant medical history: Asthma; Diabetes; Overweight; Rheumatoid arthritis; Sleep disorder like apnea   Past Surgical History:   Procedure Laterality Date    COLONOSCOPY  2/17/2014    Procedure: COLONOSCOPY;  colon-screening / vandana;  Surgeon: Donovan Dinero MD;  Location: [facility] OR     Prior diagnostic imaging/testing results:     x-ray  Prior therapy history for the same diagnosis, illness or injury: No      Prior Level of Function  Transfers: Independent  Ambulation: Independent  ADL: Independent  IADL: Independent    Living Environment  Social support: With family members   Type of home: House   Stairs to enter the home: Yes 2 Is there a railing: No     Ramp: No   Stairs inside the home: Yes 10 Is there a railing: Yes     Help at home: None  Equipment owned:   None listed    Employment: Yes dish washer  Hobbies/Interests: [employer] making    Patient goals for therapy: having a hard time holding things    Pain assessment: Pain present  See objective evaluation for additional pain details     Objective   ADDITIONAL HISTORY:  Right hand dominant  Patient reports symptoms of pain, stiffness/loss of motion, weakness/loss of strength, and edema  Transportation: drives  Currently working in normal job without restrictions    Functional Outcome Measure:   Upper Extremity Functional Index Score:  SCORE:   Column Totals: /80: (Patient-Rptd) 68   (A lower score indicates greater disability.)    PAIN:  Pain Level at Rest: 3/10  Pain Level with Use: 4/10  Pain Location: wrist, thumb, and dorsal/radial MCP  Pain Quality: Aching  Pain Frequency: constant  Pain is Worst: daytime or nighttime  Pain is  Exacerbated By: gripping  Pain is Relieved By: edema glove   Pain Progression: Unchanged    EDEMA: moderate edema present in the dorsal/radial aspect of the R LF MCP     SENSATION: WNL throughout all nerve distributions; per patient report     ROM:   R LF 1 cm from DPC measured via digit-o-meter.    OBSERVATIONS/APPEARANCE: Inflammation present in the PIP and DIP joints of all fingers. No signs of heberden's or soledad's nodes.      SPECIAL TESTING:  + for RCL laxity    STRENGTH:     Measured in pounds 6/11/2025 6/11/2025    Left Right   Trial 1 40 14     Lateral Pinch  Measured in pounds 6/11/2025 6/11/2025    Left Right   Trial 1 14 12     3 Point Pinch  Measured in pounds 6/11/2025 6/11/2025    Left Right   Trial 1 8 4     PALPATION  ++ at radial collateral ligament of R LF    Assessment & Plan   CLINICAL IMPRESSIONS  Medical Diagnosis: Right hand injury and pain    Treatment Diagnosis: Right hand injury and pain    Impression/Assessment: Pt is a 64 year old female presenting to Occupational Therapy due to right hand injury and pain.  The following significant findings have been identified: Impaired ROM, Impaired strength, and Pain.  These identified deficits interfere with their ability to perform self care tasks, work tasks, recreational activities, household chores,  yard work, and meal planning and preparation as compared to previous level of function.   Patient's limitations or Problem List includes: Pain, Decreased ROM/motion, Increased edema, Weakness, Decreased stability, Decreased , Decreased pinch, Decreased coordination, Decreased dexterity, Tightness in musculature, and Adherence in connective tissue of the right hand which interferes with the patient's ability to perform Self Care Tasks (dressing), Work Tasks, Recreational Activities, and Household Chores as compared to previous level of function.    Clinical Decision Making (Complexity):  Assessment of Occupational Performance: 5 or more  Performance Deficits  Occupational Performance Limitations: dressing, health management and maintenance, home establishment and management, meal preparation and cleanup, work, and leisure activities  Clinical Decision Making (Complexity): Low complexity    PLAN OF CARE  Treatment Interventions:  Modalities:  US and Paraffin  Therapeutic Exercise:  AROM, AAROM, PROM, Tendon Gliding, Blocking, Reverse Blocking, Place and Hold, Contract Relax, Extensor Tracking, Isotonics, Isometrics, and Stabilization  Neuromuscular re-education:  Nerve Gliding, Sensory re-education, Desensitization, Kinesthetic Training, Proprioceptive Training, Posture, Kinesiotaping, Isometrics, and Stabilization  Manual Techniques:  Coordination/Dexterity, Joint mobilization, Friction massage, Myofascial release, and Manual edema mobilization  Orthotic Fabrication:  Static, Finger based, Hand based, and Forearm based  Self Care:  Self Care Tasks and Ergonomic Considerations    Long Term Goals   OT Goal 1  Goal Identifier: Prepare a meal  Goal Description: Pt will report no difficulty with gripping required to manipulate a kitchen utensil in order to prepare a meal  Rationale: In order to maximize safety and independence with ADL/IADLs  Goal Progress: Mild difficulty  Target Date: 07/23/25      Frequency of Treatment: 1x weekly  Duration of Treatment: 6 weeks     Education Assessment: Learner/Method: Patient;Demonstration;Pictures/Video  Education Comments: PTRX via printout     Risks and benefits of evaluation/treatment have been explained.   Patient/Family/caregiver agrees with Plan of Care.     Evaluation Time:    OT Eval, Low Complexity Minutes (28843): 13  Signing Clinician: RODRIGO Martin [facility]                                                                                   OUTPATIENT OCCUPATIONAL THERAPY      PLAN OF TREATMENT FOR OUTPATIENT REHABILITATION   Patient's Last Name, First Name,  RAJATPEPE  MalachiKaryn YOB: 1960   Provider's Name   [facility]   Medical Record No.  [id number]     Onset Date: 03/01/25 Start of Care Date: 06/11/25     Medical Diagnosis:  Right hand injury and pain      OT Treatment Diagnosis:  Right hand injury and pain Plan of Treatment  Frequency/Duration:1x weekly/6 weeks    Certification date from 06/11/25   To 07/23/25        See note for plan of treatment details and functional goals     Irina Hill, OT                         I CERTIFY THE NEED FOR THESE SERVICES FURNISHED UNDER        THIS PLAN OF TREATMENT AND WHILE UNDER MY CARE     (Physician attestation of this document indicates review and certification of the therapy plan).              Referring Provider:  Reynaldo Gresham    Initial Assessment  See Epic Evaluation- 06/11/25

## 2025-06-24 ENCOUNTER — THERAPY VISIT (OUTPATIENT)
Dept: OCCUPATIONAL THERAPY | Facility: CLINIC | Age: 65
End: 2025-06-24
Payer: COMMERCIAL

## 2025-06-24 ENCOUNTER — MYC MEDICAL ADVICE (OUTPATIENT)
Dept: FAMILY MEDICINE | Facility: CLINIC | Age: 65
End: 2025-06-24

## 2025-06-24 DIAGNOSIS — S69.91XA HAND INJURY, RIGHT, INITIAL ENCOUNTER: Primary | ICD-10-CM

## 2025-06-24 DIAGNOSIS — M79.641 PAIN OF RIGHT HAND: ICD-10-CM

## 2025-06-24 PROCEDURE — 97140 MANUAL THERAPY 1/> REGIONS: CPT | Mod: GO | Performed by: OCCUPATIONAL THERAPIST

## 2025-06-24 PROCEDURE — 97535 SELF CARE MNGMENT TRAINING: CPT | Mod: GO | Performed by: OCCUPATIONAL THERAPIST

## 2025-06-24 NOTE — TELEPHONE ENCOUNTER
Patient called to schedule appointment.  First available appointment with Dr. Gresham is Tuesday, July 1st, but patient states she needs appointment as soon as possible.  Patient would prefer to see Dr. Gresham in person.  Routing to Dr. Gresham to review and advise if able to fit person in.    Citlalli Calderon,

## 2025-06-24 NOTE — LETTER
June 26, 2025      Karyn Ly  [address] [address]        To Whom It May Concern:    Karyn Ly  was seen on 6/26/2025.  Please excuse her  until 7/21/2025 due to illness and sequela.        Sincerely,        Reynaldo Gresham MD  June 26, 2025    Electronically signed

## 2025-06-26 NOTE — TELEPHONE ENCOUNTER
Contacts       Contact Date/Time Type Contact Phone/Fax    06/26/2025 03:05 PM CDT Phone (Outgoing) Karyn Ly (Self) [phone] (H)    Talked with Patient           Called and spoke with patient     Let her know her letter will be available at the 2nd floor .    Letter placed at the .     Davida Jerry,

## 2025-06-26 NOTE — TELEPHONE ENCOUNTER
Forms/Letter     Verify patient name and date of birth.  Was this done?: Yes    Verify Photo ID needed of person  item.  Name of person picking up: Karyn Ly, patient herself  Michelle Krueger

## 2025-07-02 ENCOUNTER — THERAPY VISIT (OUTPATIENT)
Dept: OCCUPATIONAL THERAPY | Facility: CLINIC | Age: 65
End: 2025-07-02
Payer: COMMERCIAL

## 2025-07-02 DIAGNOSIS — S69.91XA HAND INJURY, RIGHT, INITIAL ENCOUNTER: Primary | ICD-10-CM

## 2025-07-02 DIAGNOSIS — M79.641 PAIN OF RIGHT HAND: ICD-10-CM

## 2025-07-02 PROCEDURE — 97140 MANUAL THERAPY 1/> REGIONS: CPT | Mod: GO

## 2025-07-02 PROCEDURE — 97112 NEUROMUSCULAR REEDUCATION: CPT | Mod: GO

## 2025-07-02 PROCEDURE — 97035 APP MDLTY 1+ULTRASOUND EA 15: CPT | Mod: GO

## 2025-07-08 DIAGNOSIS — E11.9 TYPE 2 DIABETES MELLITUS WITHOUT COMPLICATION, WITHOUT LONG-TERM CURRENT USE OF INSULIN (H): ICD-10-CM

## 2025-07-09 RX ORDER — METFORMIN HYDROCHLORIDE 500 MG/1
1000 TABLET, EXTENDED RELEASE ORAL
Qty: 180 TABLET | Refills: 0 | OUTPATIENT
Start: 2025-07-09

## 2025-07-10 ENCOUNTER — THERAPY VISIT (OUTPATIENT)
Dept: OCCUPATIONAL THERAPY | Facility: CLINIC | Age: 65
End: 2025-07-10
Payer: COMMERCIAL

## 2025-07-10 DIAGNOSIS — S69.91XA HAND INJURY, RIGHT, INITIAL ENCOUNTER: Primary | ICD-10-CM

## 2025-07-10 DIAGNOSIS — M79.641 PAIN OF RIGHT HAND: ICD-10-CM

## 2025-07-11 ENCOUNTER — OFFICE VISIT (OUTPATIENT)
Dept: FAMILY MEDICINE | Facility: CLINIC | Age: 65
End: 2025-07-11
Payer: COMMERCIAL

## 2025-07-11 VITALS
BODY MASS INDEX: 26.66 KG/M2 | TEMPERATURE: 97.5 F | WEIGHT: 134 LBS | OXYGEN SATURATION: 99 % | HEART RATE: 82 BPM | RESPIRATION RATE: 18 BRPM | DIASTOLIC BLOOD PRESSURE: 67 MMHG | SYSTOLIC BLOOD PRESSURE: 101 MMHG

## 2025-07-11 DIAGNOSIS — E11.9 TYPE 2 DIABETES MELLITUS WITHOUT COMPLICATION, WITHOUT LONG-TERM CURRENT USE OF INSULIN (H): ICD-10-CM

## 2025-07-11 DIAGNOSIS — J45.20 MILD INTERMITTENT ASTHMA WITHOUT COMPLICATION: ICD-10-CM

## 2025-07-11 DIAGNOSIS — Z91.013 SHELLFISH ALLERGY: ICD-10-CM

## 2025-07-11 DIAGNOSIS — R23.8 SKIN IRRITATION: ICD-10-CM

## 2025-07-11 PROCEDURE — 99214 OFFICE O/P EST MOD 30 MIN: CPT | Performed by: INTERNAL MEDICINE

## 2025-07-11 RX ORDER — EPINEPHRINE 0.3 MG/.3ML
0.3 INJECTION SUBCUTANEOUS
Qty: 0.6 ML | Refills: 2 | Status: SHIPPED | OUTPATIENT
Start: 2025-07-11

## 2025-07-11 RX ORDER — TRIAMCINOLONE ACETONIDE 1 MG/G
CREAM TOPICAL 2 TIMES DAILY
Qty: 15 G | Refills: 1 | Status: SHIPPED | OUTPATIENT
Start: 2025-07-11

## 2025-07-11 RX ORDER — ALBUTEROL SULFATE 90 UG/1
2 INHALANT RESPIRATORY (INHALATION) EVERY 6 HOURS PRN
Qty: 18 G | Refills: 11 | Status: SHIPPED | OUTPATIENT
Start: 2025-07-11

## 2025-07-11 RX ORDER — METFORMIN HYDROCHLORIDE 500 MG/1
1000 TABLET, EXTENDED RELEASE ORAL
Qty: 180 TABLET | Refills: 3 | Status: SHIPPED | OUTPATIENT
Start: 2025-07-11

## 2025-07-11 ASSESSMENT — ASTHMA QUESTIONNAIRES
QUESTION_2 LAST FOUR WEEKS HOW OFTEN HAVE YOU HAD SHORTNESS OF BREATH: NOT AT ALL
QUESTION_1 LAST FOUR WEEKS HOW MUCH OF THE TIME DID YOUR ASTHMA KEEP YOU FROM GETTING AS MUCH DONE AT WORK, SCHOOL OR AT HOME: NONE OF THE TIME
QUESTION_4 LAST FOUR WEEKS HOW OFTEN HAVE YOU USED YOUR RESCUE INHALER OR NEBULIZER MEDICATION (SUCH AS ALBUTEROL): NOT AT ALL
QUESTION_3 LAST FOUR WEEKS HOW OFTEN DID YOUR ASTHMA SYMPTOMS (WHEEZING, COUGHING, SHORTNESS OF BREATH, CHEST TIGHTNESS OR PAIN) WAKE YOU UP AT NIGHT OR EARLIER THAN USUAL IN THE MORNING: NOT AT ALL
ACT_TOTALSCORE: 24
QUESTION_5 LAST FOUR WEEKS HOW WOULD YOU RATE YOUR ASTHMA CONTROL: WELL CONTROLLED

## 2025-07-11 ASSESSMENT — PAIN SCALES - GENERAL: PAINLEVEL_OUTOF10: MODERATE PAIN (6)

## 2025-07-11 NOTE — PROGRESS NOTES
"  Assessment & Plan   Problem List Items Addressed This Visit       Mild intermittent asthma (Chronic)    Relevant Medications    albuterol (PROAIR HFA/PROVENTIL HFA/VENTOLIN HFA) 108 (90 Base) MCG/ACT inhaler    Type 2 diabetes mellitus without complication, without long-term current use of insulin (H) (Chronic)    Relevant Medications    metFORMIN (GLUCOPHAGE XR) 500 MG 24 hr tablet    Shellfish allergy    Relevant Medications    albuterol (PROAIR HFA/PROVENTIL HFA/VENTOLIN HFA) 108 (90 Base) MCG/ACT inhaler    EPINEPHrine (ANY BX GENERIC EQUIV) 0.3 MG/0.3ML injection 2-pack     Other Visit Diagnoses         Skin irritation        Relevant Medications    triamcinolone (KENALOG) 0.1 % external cream         BP     101/67  7/14/2025    Lab Results   Component Value Date     05/02/2025     01/21/2022     01/08/2021     Lab Results   Component Value Date    A1C 6.7 05/02/2025    A1C 7.3 01/08/2021     Lab Results   Component Value Date    LDL 96 05/02/2025    LDL 95 01/08/2021     Lab Results   Component Value Date    MICROL <12.0 05/02/2025    MICROL 14 01/21/2022    MICROL 11 01/08/2021     No results found for: \"MICROALBUMIN\"          Subjective   Karyn Hairston is a 64 year old, presenting for the following health issues:  Arthritis (Right Hand)        7/11/2025     9:17 AM   Additional Questions   Roomed by Dee     Arthritis    History of Present Illness       Reason for visit:  Hand injury    She eats 2-3 servings of fruits and vegetables daily.She consumes 1 sweetened beverage(s) daily.She exercises with enough effort to increase her heart rate 30 to 60 minutes per day.  She exercises with enough effort to increase her heart rate 3 or less days per week.   She is taking medications regularly.      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      Objective    /67 (BP Location: Left arm, Patient Position: Sitting, Cuff Size: Adult Regular)   Pulse 82   Temp 97.5  F (36.4  C) (Temporal)   Resp 18   Wt 60.8 kg (134 lb)   LMP  (LMP Unknown)   SpO2 99%   BMI 26.66 kg/m    Body mass index is 26.66 kg/m .  Physical Exam   GENERAL: alert and no distress  EYES: Eyes grossly normal to inspection, PERRL and conjunctivae and sclerae normal  HENT: ear canals and TM's normal, nose and mouth without ulcers or lesions  NECK: no adenopathy, no asymmetry, masses, or scars  RESP: lungs clear to auscultation - no rales, rhonchi or wheezes  CV: regular rate and rhythm, normal S1 S2, no S3 or S4, no murmur, click or rub, no peripheral edema  ABDOMEN: soft, nontender, no hepatosplenomegaly, no masses and bowel sounds normal  MS: no gross musculoskeletal defects noted, no edema  SKIN: no suspicious lesions or rashes  NEURO: Normal strength and tone, mentation intact and speech normal  BACK: no CVA tenderness, no paralumbar tenderness  PSYCH: mentation appears normal, affect normal/bright    No results found for any visits on 07/11/25.        Signed Electronically by: Reynaldo Gresham MD

## 2025-07-16 ENCOUNTER — THERAPY VISIT (OUTPATIENT)
Dept: OCCUPATIONAL THERAPY | Facility: CLINIC | Age: 65
End: 2025-07-16
Payer: COMMERCIAL

## 2025-07-16 DIAGNOSIS — S69.91XA HAND INJURY, RIGHT, INITIAL ENCOUNTER: Primary | ICD-10-CM

## 2025-07-16 DIAGNOSIS — M79.641 PAIN OF RIGHT HAND: ICD-10-CM

## 2025-07-16 PROCEDURE — 97035 APP MDLTY 1+ULTRASOUND EA 15: CPT | Mod: GO

## 2025-07-16 PROCEDURE — 97112 NEUROMUSCULAR REEDUCATION: CPT | Mod: GO

## 2025-07-16 PROCEDURE — 97140 MANUAL THERAPY 1/> REGIONS: CPT | Mod: GO

## 2025-07-23 ENCOUNTER — THERAPY VISIT (OUTPATIENT)
Dept: OCCUPATIONAL THERAPY | Facility: CLINIC | Age: 65
End: 2025-07-23
Payer: COMMERCIAL

## 2025-07-23 DIAGNOSIS — M79.641 PAIN OF RIGHT HAND: ICD-10-CM

## 2025-07-23 DIAGNOSIS — S69.91XA HAND INJURY, RIGHT, INITIAL ENCOUNTER: Primary | ICD-10-CM

## 2025-07-23 PROCEDURE — 97110 THERAPEUTIC EXERCISES: CPT | Mod: GO

## 2025-07-23 PROCEDURE — 97035 APP MDLTY 1+ULTRASOUND EA 15: CPT | Mod: GO

## 2025-08-03 ENCOUNTER — HEALTH MAINTENANCE LETTER (OUTPATIENT)
Age: 65
End: 2025-08-03

## 2025-08-05 PROBLEM — M79.641 PAIN OF RIGHT HAND: Status: RESOLVED | Noted: 2025-06-11 | Resolved: 2025-08-05

## 2025-08-05 PROBLEM — S69.91XA HAND INJURY, RIGHT, INITIAL ENCOUNTER: Status: RESOLVED | Noted: 2025-06-11 | Resolved: 2025-08-05

## 2025-08-06 ENCOUNTER — THERAPY VISIT (OUTPATIENT)
Dept: OCCUPATIONAL THERAPY | Facility: CLINIC | Age: 65
End: 2025-08-06
Payer: COMMERCIAL

## 2025-08-06 DIAGNOSIS — M79.641 PAIN OF RIGHT HAND: Primary | ICD-10-CM

## 2025-08-06 PROCEDURE — 97760 ORTHOTIC MGMT&TRAING 1ST ENC: CPT | Mod: GO

## 2025-09-01 ENCOUNTER — PATIENT OUTREACH (OUTPATIENT)
Dept: CARE COORDINATION | Facility: CLINIC | Age: 65
End: 2025-09-01
Payer: COMMERCIAL